# Patient Record
Sex: MALE | Race: WHITE | NOT HISPANIC OR LATINO | Employment: FULL TIME | URBAN - METROPOLITAN AREA
[De-identification: names, ages, dates, MRNs, and addresses within clinical notes are randomized per-mention and may not be internally consistent; named-entity substitution may affect disease eponyms.]

---

## 2024-02-01 ENCOUNTER — TELEPHONE (OUTPATIENT)
Dept: GASTROENTEROLOGY | Facility: CLINIC | Age: 49
End: 2024-02-01
Payer: COMMERCIAL

## 2024-02-01 NOTE — TELEPHONE ENCOUNTER
Called & spoke to pt  - Requesting a call back in 15 minutes due to currently being on a conference call

## 2024-02-01 NOTE — TELEPHONE ENCOUNTER
----- Message from Jolynn Chicas RN sent at 2/1/2024 10:54 AM CST -----    ----- Message -----  From: Beni Braun  Sent: 2/1/2024  10:52 AM CST  To: Perlita Salas    Good morning,     The pt listed above is being referred from [facility] to Dr. Bhat for (ibd). I have scanned the records into . I faxed the referring office the referral form and will scan it into  if we receive it. Please advise or contact pt to schedule appt at your earliest convenience.     Thank You,     Beni Braun  [facility]

## 2024-02-02 ENCOUNTER — TELEPHONE (OUTPATIENT)
Dept: GASTROENTEROLOGY | Facility: CLINIC | Age: 49
End: 2024-02-02
Payer: COMMERCIAL

## 2024-02-05 ENCOUNTER — TELEPHONE (OUTPATIENT)
Dept: GASTROENTEROLOGY | Facility: CLINIC | Age: 49
End: 2024-02-05
Payer: COMMERCIAL

## 2024-02-05 NOTE — PROGRESS NOTES
"          [facility]          Inflammatory Bowel Disease          New Patient Note         TODAY'S VISIT DATE:  2/6/2024    Reason for Consult:    Chief Complaint   Patient presents with    Crohn's Disease     PCP: Unable, To Obtain      Referring MD:   Dr. KEO Mosher    History of Present Illness:    Dear. Dr. KEO Mosher    Thank you for your referral on your patient Dharmesh Lozoya who is a 48 y.o. male seen today at the [facility] for Crohn's disease (pancolonic, perianal fistula) with pertinent past medical history including history of C diff (neg 12/2022, 5/2023).  Patient was doing well until 2015 and developed blood in his stools and saw family medicine doctor in [place] and had a flex sig and diagnosed "colitis."  He then had a colonoscopy by a GI doctor in [place] and recalls being told distal colitis and started on medication that was too expensive and took for 30 days (sounds like apriso) but too expensive and not effective so discontinued. At that time he did not have insurance so he proceeded with dietary changes (more fish, turmeric).  Within a few weeks symptoms resolved and back to baseline normal BMs 4-5 variable consistency but no blood and able to go back to normal lifestyle. In 2017 he moved from [place] to [place] and in 12/2022 began with worsening bloody diarrhea, cramping and weight loss. He was diagnosed with C diff and vancomycin started which helped significant with his symptoms. His symptoms went from 10-15 BMs/d to 8-12 BMs/d after oral vancomycin.   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  He was then placed on lialda 3.6 g/d with entocort 9 mg/d (decreased to 6 " mg/d 3/2023) which he took for 1 month with some response and then discontinued due to ineffectiveness. He was hospitalized 3/2023 at [facility] for 1 days due to significant bloody diarrhea with weakness and dehydration and he had ran out of entocort a week before and was off of lialda by that time. He was discharged home on prednisone 40 mg daily for 2 weeks and advised to f/u with his GI doctor. In 3/2023 he noticed abscess which spontaneously drained and also at that time had issues with hemorrhoids He then started stelara 23. In 2023 pt had good response in regards to bleeding and stool frequency and at that time C diff recurrent and treated with vancomycin x 10 days.  Approximately about 6 weeks after starting IV stelara noticed initially small pruritic blisters between toes and then progressed to head, arms, legs, feet, toes. These lesions were treated symptomatically with hydroxyzine and he took athlete's foot powder and then eventually got topical cream for horses that helped (antifungal/antibacterial). Diet he found if dairy, spicy foods, red sauce exacerbated symptoms.  Colonoscopy 23 significant for inflammation with congestion, erosions, friability and mucus in a contiuous and circumferential pattern from anus to sigmoid c/w moderate inflammation (hernandez score 3)-bx c/w moderate active inflammation, CMV neg. Perianal exam at time of scope c/w palpable lump c/w fistula with drainage. In 2023 pt started on oral prednisone 40 mg daily for 3 weeks and then decreased to 30 mg/d around 24 and continues on this dose now.  He does not notice fistula or drainage. His last dose of stelara 24 and after discontinuation the rash resolved. Currently he has 4-5 soft to formed BMs/d, no blood, no urgency, no nocturnal BMs (AM gas) and this has improved since first week of 2024.      Prior Pertinent Surgeries:   None    Last pertinent Endoscopy/Imagin2023 colonoscopy:  "inflammation with altered vascularity, congestion, erosions, friability and serpentine ulcerations in a continuous and circumferential pattern from anus to cecum, normal TI   12/29/23 colonoscopy:  inflammation with congestion, erosions, friability ad mucus in a contiuous and circumferential pattern from anus to sigmoid c/w moderate inflammation (hernandez score 3). Scope not complete due to degree of inflammation. Biopsies showed rectum and sigmoid with moderately active inflammation with ulceration, CMV negative. On colonoscopy there was fistula with palpable lump "peanut" size with drainage.    Therapeutic Drug Monitoring Labs:  NA    Prior IBD Therapies:  Oral mesalamine (unclear which one but seems like apriso)- ineffective but only took for 4 weeks  lialda 3.6 g/d   Entocort 9 mg/d- ineffective    Vaccinations:  No results found for: "HEPBSAB"  No results found for: "HEPBSURFABQU"  Lab Results   Component Value Date    HEPAIGG Non-reactive 02/06/2024     No results found for: "VARICELLAZOS", "VARICELLAINT"    There is no immunization history on file for this patient.  Flu shot: recommended yearly, declined  COVID vaccine/booster:  per CDC recommendations  Tetanus (Tdap):  not had one in past 10 years   PPSV 20: deferred  HPV: NA  Meningococcal: NA  Hepatitis B: will check immunity     Hepatitis A:  will check immunity     MMR (live vaccine): will check immunity          Chickenpox status/Varicella (live vaccine):  will check immunity     Shingrix: recommended     Review of Systems   Constitutional:  Negative for chills, fever and weight loss.   HENT:          No oral ulcers, dysphagia, oral thrush   Eyes:  Negative for blurred vision, pain and redness.   Respiratory:  Negative for cough and shortness of breath.    Cardiovascular:  Negative for chest pain.   Gastrointestinal:  Negative for abdominal pain, heartburn, nausea and vomiting.   Genitourinary:  Negative for dysuria and hematuria.   Musculoskeletal:  " "Negative for back pain and joint pain.   Skin:  Negative for rash.   Psychiatric/Behavioral:  Negative for depression. The patient is not nervous/anxious and does not have insomnia.      Medical/Surgical History:    has a past medical history of Crohn's disease and Recurrent Clostridioides difficile infection.    has no past surgical history on file.     Family History:   family history includes Anuerysm in his brother; Heart attack in his father and mother; Hypertension in his father and mother; Inflammatory bowel disease in his father; Seizures in his brother.     Social History:    reports that he has quit smoking. His smoking use included cigarettes. He started smoking about 15 years ago. He has a 15.0 pack-year smoking history. His smokeless tobacco use includes snuff. He reports current alcohol use. He reports that he does not use drugs.     Review of patient's allergies indicates:   Allergen Reactions    Stelara [ustekinumab] Blisters     Current Medications:   Outpatient Medications Marked as Taking for the 2/6/24 encounter (Office Visit) with Tianna Bhat MD   Medication Sig Dispense Refill    predniSONE (DELTASONE) 10 MG tablet Take 30 mg by mouth once daily.        Vital Signs:  BP (!) 137/90 (BP Location: Left arm, Patient Position: Sitting)   Pulse 81   Temp 99 °F (37.2 °C)   Ht 6' 2" (1.88 m)   Wt 100.5 kg (221 lb 9 oz)   SpO2 99%   BMI 28.45 kg/m²      Physical Exam  Constitutional:       General: He is not in acute distress.  Cardiovascular:      Rate and Rhythm: Normal rate and regular rhythm.      Pulses: Normal pulses.      Heart sounds: Normal heart sounds. No murmur heard.  Pulmonary:      Effort: Pulmonary effort is normal.      Breath sounds: Normal breath sounds.   Abdominal:      General: Bowel sounds are normal. There is no distension.      Palpations: Abdomen is soft.      Tenderness: There is no abdominal tenderness.   Genitourinary:     Comments: Perianal small fistula, no " abscess    Labs: Reviewed    Assessment/Plan:  Dharmesh Lozoya is a 48 y.o. male with Crohn's disease (pan-colonic, perianal fistula), recurrent C diff (12/2022, 5/2023).     Patient is currently on prednisone 30 mg/d and has been on prednisone since 12/2023. He has good response and we will do a stool calprotectin and I have asked today that he decrease prednisone from 30 mg daily to 20 mg daily and we will check in on him in 1 week. We had a long discussion regarding epidemiology, potential etiologies, associations and triggers, diagnosis, management goals and treatment options. Patient was on Stelara though difficult to know if he was a responder or not given he was on it for about 4 mos but also was not induced into remission adequately until after it was discontinued.  He had some blisters attributed to stelara and so he discontinued this and this resolved.  Today we discussed different treatment options. We discussed that though he has small fistula that the data for perianal fistula closure is best with TNF inhibitors.  We also discussed importance of getting him off of prednisone and on a treatment that will work relatively sooner.  Options discussed included TNF inhibitors including remicade and humira and entyvio. After extensive discussion we will proceed with humira with oral MTX.     # Crohn's disease (pan-colonic, perianal fistula):    - stool calprotectin- pt will get done [facility]  - MRI pelvis  - colonoscopy timing to be determined  - discussed MOA, risks, route/dose of humira and oral MTX  - decrease prednisone from 30 mg daily to 20 mg daily tomorrow, 2/7  - start MTX 12.5 mg po weekly, folic acid 1 mg daily   - will start insurance authorization for humira  - smoking status: never smoked   - basic labs: CBC, CMP, CRP, HCV Ab, HIV, vitamin B12, TSH, TTG IgA/total serum IgA  - drug monitoring labs: TPMT, TB quantiferon, Hep B testing (HBsAg, HBtotalcoreAb)  - TDM:  trough ADA level week 12    #  Immunodeficiency due to long term immunosuppressive drug therapy and IBD specific health maintenance:  CRC risk- sx 2015, pancolonic, surveillance colonoscopy q 1-2 years  Skin exam- recommend using sunblock/hats/sunprotective clothing and yearly skin exams  Risk for osteopenia/osteoporosis- on prednisone, will recommend calcium/vit D  Vitamin D- will check level today  Vaccines- no live vaccines, briefly discussed importance of vaccines, will check immunity to hep A, B, varicella and MMR    Visit today is associated with current or anticipated ongoing medical care related to this patient's single serious condition/complex condition Crohn's disease.     I spent a total of 60 minutes on the day of the visit.This includes face to face time and on-face to face time preparing to see the patient (eg, review of tests, notes), obtaining and/or reviewing separately obtained history, documenting clinical information in the electronic or other health record, independently interpreting results and communicating results to the patient/family/caregiver, and coordinating care.     Follow up in about 6 weeks (around 3/19/2024) for appt with Perlita/Janeen mcfarland to overarash .    Thank you again for sending Dharmesh Lozoya to see Dr. Tianna Bhat today at the [facility]. Please don't hesitate to contact Dr. Bhat if there are any questions regarding this evaluation, or if you have any other patients with inflammatory bowel disease for whom you would like a consultation. You can reach Dr. Bhat at [phone] or by email at [email or web address]    Tianna Bhat MD  Department of Gastroenterology  Medical Director, Inflammatory Bowel Disease

## 2024-02-06 ENCOUNTER — OFFICE VISIT (OUTPATIENT)
Dept: GASTROENTEROLOGY | Facility: CLINIC | Age: 49
End: 2024-02-06
Payer: COMMERCIAL

## 2024-02-06 ENCOUNTER — TELEPHONE (OUTPATIENT)
Dept: GASTROENTEROLOGY | Facility: CLINIC | Age: 49
End: 2024-02-06
Payer: COMMERCIAL

## 2024-02-06 ENCOUNTER — LAB VISIT (OUTPATIENT)
Dept: LAB | Facility: HOSPITAL | Age: 49
End: 2024-02-06
Attending: INTERNAL MEDICINE
Payer: COMMERCIAL

## 2024-02-06 VITALS
HEIGHT: 74 IN | WEIGHT: 221.56 LBS | TEMPERATURE: 99 F | SYSTOLIC BLOOD PRESSURE: 137 MMHG | DIASTOLIC BLOOD PRESSURE: 90 MMHG | BODY MASS INDEX: 28.43 KG/M2 | HEART RATE: 81 BPM | OXYGEN SATURATION: 99 %

## 2024-02-06 DIAGNOSIS — K50.913 PERIANAL FISTULA DUE TO CROHN'S DISEASE: ICD-10-CM

## 2024-02-06 DIAGNOSIS — A49.8 RECURRENT CLOSTRIDIOIDES DIFFICILE INFECTION: Primary | ICD-10-CM

## 2024-02-06 DIAGNOSIS — T45.1X5A IMMUNODEFICIENCY DUE TO LONG TERM IMMUNOSUPPRESSIVE DRUG THERAPY: ICD-10-CM

## 2024-02-06 DIAGNOSIS — Z79.899 IMMUNODEFICIENCY DUE TO LONG TERM IMMUNOSUPPRESSIVE DRUG THERAPY: ICD-10-CM

## 2024-02-06 DIAGNOSIS — K50.10 CROHN'S DISEASE OF COLON WITHOUT COMPLICATION: ICD-10-CM

## 2024-02-06 DIAGNOSIS — D84.821 IMMUNODEFICIENCY DUE TO LONG TERM IMMUNOSUPPRESSIVE DRUG THERAPY: ICD-10-CM

## 2024-02-06 PROBLEM — Z79.60 IMMUNODEFICIENCY DUE TO LONG TERM IMMUNOSUPPRESSIVE DRUG THERAPY: Status: ACTIVE | Noted: 2024-02-06

## 2024-02-06 LAB
25(OH)D3+25(OH)D2 SERPL-MCNC: 23 NG/ML (ref 30–96)
ALBUMIN SERPL BCP-MCNC: 3.4 G/DL (ref 3.5–5.2)
ALBUMIN SERPL BCP-MCNC: 3.4 G/DL (ref 3.5–5.2)
ALP SERPL-CCNC: 66 U/L (ref 55–135)
ALP SERPL-CCNC: 66 U/L (ref 55–135)
ALT SERPL W/O P-5'-P-CCNC: 55 U/L (ref 10–44)
ALT SERPL W/O P-5'-P-CCNC: 55 U/L (ref 10–44)
ANION GAP SERPL CALC-SCNC: 7 MMOL/L (ref 8–16)
ANION GAP SERPL CALC-SCNC: 7 MMOL/L (ref 8–16)
AST SERPL-CCNC: 27 U/L (ref 10–40)
AST SERPL-CCNC: 27 U/L (ref 10–40)
BASOPHILS # BLD AUTO: 0.01 K/UL (ref 0–0.2)
BASOPHILS # BLD AUTO: 0.01 K/UL (ref 0–0.2)
BASOPHILS NFR BLD: 0.1 % (ref 0–1.9)
BASOPHILS NFR BLD: 0.1 % (ref 0–1.9)
BILIRUB SERPL-MCNC: 0.4 MG/DL (ref 0.1–1)
BILIRUB SERPL-MCNC: 0.4 MG/DL (ref 0.1–1)
BUN SERPL-MCNC: 10 MG/DL (ref 6–20)
BUN SERPL-MCNC: 10 MG/DL (ref 6–20)
CALCIUM SERPL-MCNC: 8.8 MG/DL (ref 8.7–10.5)
CALCIUM SERPL-MCNC: 8.8 MG/DL (ref 8.7–10.5)
CHLORIDE SERPL-SCNC: 109 MMOL/L (ref 95–110)
CHLORIDE SERPL-SCNC: 109 MMOL/L (ref 95–110)
CO2 SERPL-SCNC: 24 MMOL/L (ref 23–29)
CO2 SERPL-SCNC: 24 MMOL/L (ref 23–29)
CREAT SERPL-MCNC: 1 MG/DL (ref 0.5–1.4)
CREAT SERPL-MCNC: 1 MG/DL (ref 0.5–1.4)
CRP SERPL-MCNC: 1.5 MG/L (ref 0–8.2)
DIFFERENTIAL METHOD BLD: ABNORMAL
DIFFERENTIAL METHOD BLD: ABNORMAL
EOSINOPHIL # BLD AUTO: 0 K/UL (ref 0–0.5)
EOSINOPHIL # BLD AUTO: 0 K/UL (ref 0–0.5)
EOSINOPHIL NFR BLD: 0 % (ref 0–8)
EOSINOPHIL NFR BLD: 0 % (ref 0–8)
ERYTHROCYTE [DISTWIDTH] IN BLOOD BY AUTOMATED COUNT: 20.8 % (ref 11.5–14.5)
ERYTHROCYTE [DISTWIDTH] IN BLOOD BY AUTOMATED COUNT: 20.8 % (ref 11.5–14.5)
EST. GFR  (NO RACE VARIABLE): >60 ML/MIN/1.73 M^2
EST. GFR  (NO RACE VARIABLE): >60 ML/MIN/1.73 M^2
GLUCOSE SERPL-MCNC: 154 MG/DL (ref 70–110)
GLUCOSE SERPL-MCNC: 154 MG/DL (ref 70–110)
HAV IGG SER QL IA: NORMAL
HBV CORE AB SERPL QL IA: NORMAL
HBV SURFACE AG SERPL QL IA: NORMAL
HCT VFR BLD AUTO: 38.4 % (ref 40–54)
HCT VFR BLD AUTO: 38.4 % (ref 40–54)
HCV AB SERPL QL IA: NORMAL
HGB BLD-MCNC: 11.4 G/DL (ref 14–18)
HGB BLD-MCNC: 11.4 G/DL (ref 14–18)
HIV 1+2 AB+HIV1 P24 AG SERPL QL IA: NORMAL
IGA SERPL-MCNC: 349 MG/DL (ref 40–350)
IMM GRANULOCYTES # BLD AUTO: 0.05 K/UL (ref 0–0.04)
IMM GRANULOCYTES # BLD AUTO: 0.05 K/UL (ref 0–0.04)
IMM GRANULOCYTES NFR BLD AUTO: 0.5 % (ref 0–0.5)
IMM GRANULOCYTES NFR BLD AUTO: 0.5 % (ref 0–0.5)
LYMPHOCYTES # BLD AUTO: 0.7 K/UL (ref 1–4.8)
LYMPHOCYTES # BLD AUTO: 0.7 K/UL (ref 1–4.8)
LYMPHOCYTES NFR BLD: 6.9 % (ref 18–48)
LYMPHOCYTES NFR BLD: 6.9 % (ref 18–48)
MCH RBC QN AUTO: 22.9 PG (ref 27–31)
MCH RBC QN AUTO: 22.9 PG (ref 27–31)
MCHC RBC AUTO-ENTMCNC: 29.7 G/DL (ref 32–36)
MCHC RBC AUTO-ENTMCNC: 29.7 G/DL (ref 32–36)
MCV RBC AUTO: 77 FL (ref 82–98)
MCV RBC AUTO: 77 FL (ref 82–98)
MONOCYTES # BLD AUTO: 0.8 K/UL (ref 0.3–1)
MONOCYTES # BLD AUTO: 0.8 K/UL (ref 0.3–1)
MONOCYTES NFR BLD: 7.4 % (ref 4–15)
MONOCYTES NFR BLD: 7.4 % (ref 4–15)
NEUTROPHILS # BLD AUTO: 8.7 K/UL (ref 1.8–7.7)
NEUTROPHILS # BLD AUTO: 8.7 K/UL (ref 1.8–7.7)
NEUTROPHILS NFR BLD: 85.1 % (ref 38–73)
NEUTROPHILS NFR BLD: 85.1 % (ref 38–73)
NRBC BLD-RTO: 0 /100 WBC
NRBC BLD-RTO: 0 /100 WBC
PLATELET # BLD AUTO: 244 K/UL (ref 150–450)
PLATELET # BLD AUTO: 244 K/UL (ref 150–450)
PMV BLD AUTO: 12.5 FL (ref 9.2–12.9)
PMV BLD AUTO: 12.5 FL (ref 9.2–12.9)
POTASSIUM SERPL-SCNC: 3.5 MMOL/L (ref 3.5–5.1)
POTASSIUM SERPL-SCNC: 3.5 MMOL/L (ref 3.5–5.1)
PROT SERPL-MCNC: 6.3 G/DL (ref 6–8.4)
PROT SERPL-MCNC: 6.3 G/DL (ref 6–8.4)
RBC # BLD AUTO: 4.97 M/UL (ref 4.6–6.2)
RBC # BLD AUTO: 4.97 M/UL (ref 4.6–6.2)
SODIUM SERPL-SCNC: 140 MMOL/L (ref 136–145)
SODIUM SERPL-SCNC: 140 MMOL/L (ref 136–145)
TSH SERPL DL<=0.005 MIU/L-ACNC: 0.41 UIU/ML (ref 0.4–4)
VIT B12 SERPL-MCNC: 348 PG/ML (ref 210–950)
WBC # BLD AUTO: 10.2 K/UL (ref 3.9–12.7)
WBC # BLD AUTO: 10.2 K/UL (ref 3.9–12.7)

## 2024-02-06 PROCEDURE — 86762 RUBELLA ANTIBODY: CPT | Performed by: INTERNAL MEDICINE

## 2024-02-06 PROCEDURE — 86140 C-REACTIVE PROTEIN: CPT | Performed by: INTERNAL MEDICINE

## 2024-02-06 PROCEDURE — 85025 COMPLETE CBC W/AUTO DIFF WBC: CPT | Performed by: INTERNAL MEDICINE

## 2024-02-06 PROCEDURE — 86787 VARICELLA-ZOSTER ANTIBODY: CPT | Performed by: INTERNAL MEDICINE

## 2024-02-06 PROCEDURE — 86803 HEPATITIS C AB TEST: CPT | Performed by: INTERNAL MEDICINE

## 2024-02-06 PROCEDURE — 82306 VITAMIN D 25 HYDROXY: CPT | Performed by: INTERNAL MEDICINE

## 2024-02-06 PROCEDURE — 86364 TISS TRNSGLTMNASE EA IG CLAS: CPT | Performed by: INTERNAL MEDICINE

## 2024-02-06 PROCEDURE — 82607 VITAMIN B-12: CPT | Performed by: INTERNAL MEDICINE

## 2024-02-06 PROCEDURE — 86790 VIRUS ANTIBODY NOS: CPT | Performed by: INTERNAL MEDICINE

## 2024-02-06 PROCEDURE — 82784 ASSAY IGA/IGD/IGG/IGM EACH: CPT | Performed by: INTERNAL MEDICINE

## 2024-02-06 PROCEDURE — 84433 ASY THIOPURIN S-MTHYLTRNSFRS: CPT | Performed by: INTERNAL MEDICINE

## 2024-02-06 PROCEDURE — 86480 TB TEST CELL IMMUN MEASURE: CPT | Performed by: INTERNAL MEDICINE

## 2024-02-06 PROCEDURE — 87389 HIV-1 AG W/HIV-1&-2 AB AG IA: CPT | Performed by: INTERNAL MEDICINE

## 2024-02-06 PROCEDURE — 87340 HEPATITIS B SURFACE AG IA: CPT | Performed by: INTERNAL MEDICINE

## 2024-02-06 PROCEDURE — 86706 HEP B SURFACE ANTIBODY: CPT | Performed by: INTERNAL MEDICINE

## 2024-02-06 PROCEDURE — 86704 HEP B CORE ANTIBODY TOTAL: CPT | Performed by: INTERNAL MEDICINE

## 2024-02-06 PROCEDURE — G2211 COMPLEX E/M VISIT ADD ON: HCPCS | Mod: S$GLB,,, | Performed by: INTERNAL MEDICINE

## 2024-02-06 PROCEDURE — 86735 MUMPS ANTIBODY: CPT | Performed by: INTERNAL MEDICINE

## 2024-02-06 PROCEDURE — 86765 RUBEOLA ANTIBODY: CPT | Performed by: INTERNAL MEDICINE

## 2024-02-06 PROCEDURE — 84443 ASSAY THYROID STIM HORMONE: CPT | Performed by: INTERNAL MEDICINE

## 2024-02-06 PROCEDURE — 99205 OFFICE O/P NEW HI 60 MIN: CPT | Mod: S$GLB,,, | Performed by: INTERNAL MEDICINE

## 2024-02-06 PROCEDURE — 80053 COMPREHEN METABOLIC PANEL: CPT | Performed by: INTERNAL MEDICINE

## 2024-02-06 RX ORDER — PREDNISONE 10 MG/1
30 TABLET ORAL DAILY
COMMUNITY
End: 2024-03-12 | Stop reason: SDUPTHER

## 2024-02-06 RX ORDER — FOLIC ACID 1 MG/1
1 TABLET ORAL DAILY
Qty: 90 TABLET | Refills: 3 | Status: SHIPPED | OUTPATIENT
Start: 2024-02-06 | End: 2024-06-17

## 2024-02-06 RX ORDER — ADALIMUMAB 80MG/0.8ML
80 KIT SUBCUTANEOUS SEE ADMIN INSTRUCTIONS
Qty: 3 PEN | Refills: 0 | Status: ACTIVE | OUTPATIENT
Start: 2024-02-06 | End: 2024-06-14 | Stop reason: ALTCHOICE

## 2024-02-06 RX ORDER — METHOTREXATE 2.5 MG/1
12.5 TABLET ORAL
Qty: 20 TABLET | Refills: 0 | Status: SHIPPED | OUTPATIENT
Start: 2024-02-06 | End: 2024-03-04

## 2024-02-06 RX ORDER — FOLIC ACID 1 MG/1
1 TABLET ORAL DAILY
Qty: 90 TABLET | Refills: 3 | Status: SHIPPED | OUTPATIENT
Start: 2024-02-06 | End: 2024-02-06 | Stop reason: CLARIF

## 2024-02-06 RX ORDER — METHOTREXATE 2.5 MG/1
12.5 TABLET ORAL
Qty: 20 TABLET | Refills: 0 | Status: SHIPPED | OUTPATIENT
Start: 2024-02-06 | End: 2024-02-06 | Stop reason: CLARIF

## 2024-02-06 NOTE — PATIENT INSTRUCTIONS
Instructions:  - labs today  - stool studies  - decrease prednisone to 20 mg daily  - we will start insurance approval for humira- email me if when you start  - start methotrexate 5 pills once a week with folic acid 1 mg daily  - take methotrexate at bedtime once a week and if any nausea let me know  - labs ideally at [facility] if possible   - Avoid all NSAIDs:  ibuprofen (Advil, Motrin), naprosyn (Aleve), aspirin, BC powder, Goodie's powder, diclofenac (Voltaren)  - Tylenol (acetaminophen) safe in IBD   - Yearly Skin exam--wear sun block and hats  - Use antibiotics with caution and only take if necessary, please inform us of any infections or need for antibiotics   - No live vaccines if your are immunosuppressed   - Please avoid raw seafood (such as raw oysters or raw sushi) if you are immunosuppressed     Adalimumab (Humira)     Class: TNF alpha Blocker - Biologic product    Mechanism of Action: blocks TNF alpha which plays a role in the inflammatory process for Inflammatory Bowel Disease (IBD).    Other indications: Rheumatoid Arthritis, Psoriatic Arthritis, Ankylosing Spondylitis, Juvenile Idiopathic Arthritis, Plaque Psoriasis, Hidradenitis Suppurativa, Uveitis.    Dosage/ Route/ Frequency: Inject under the skin in the subcutaneous tissue   - Loading Dose (non-citrate free): 160 mg Subcutaneous (SC) on week 0 (4 pens or pre-filled syringes), 80 mg SC on week 2 (2 pens or prefilled syringes)   - Citrate Free Loading Dose: 160 mg Subcutaneous on week 0 (2 pens or pre-filled syringes), 80 mg SC on week 2 (1 pens or prefilled syringes)  - Maintenance Dosin mg SC every other week (1 pen or prefilled syringe)    Side effects    Stop therapy due to adverse event= 10% (10/100)  Please notify us if you are treated with antibiotics or experience signs of infection (ie. fevers) given we may need to hold your medication during this time.     Common side effects (>5% - 1 in 20 people): upper respiratory infection,  nose/throat infections, headaches, rash.    Serious side effects:     Please call us immediately if you develop any of the below problems:    Allergic reactions  - <2% (2/100) develop injection site reactions  - Hypersensitivity reaction- hives (rash), difficulty breathing, chest pain/tightness, high or low blood pressure, swelling of the face and hands, fever or chills    Serious Infections 3% (3/100): viral/bacterial and fungal infections. Symptoms you might experience include fever, tiredness, flu like symptoms, open sores, warm red painful skin.    Blood Disorders: fever that doesn't go away, bruising, bleeding, severe paleness    Malignancies:  Non-Hodgkin's Lymphoma 0.06% (6/10,000)  Non-Melanoma skin cancer This medication may increase your risk of skin cancer, yearly skin checks are recommended. Make sure you are using sun block and protective wear.    Drug related lupus-like reaction 1% (1/100): chest discomfort or pain that does not go away, shortness of breath, joint pain, rash on the cheeks or arms that gets worse in the sun    Psoriasis: new or worsening red scaly patches or raised bumps on the skin that are filled with pus     Case Reports Only: Multiple Sclerosis and Guillain [place] Syndrome  Numbness/weakness/tingling of your hands and feet, changes in your vision or seizures    Case Reports Only: New or worsening Congestive Heart Failure  shortness of breath, swelling in your ankles or feet, sudden weight gain    Case Reports Only: Serious Liver Injury  jaundice (yellow skin or eyes), dark brown urine, right-sided abdominal pain, fever, severe itchiness    Labs/Monitoring:  Prior to starting this new agent, we will be checking labs to determine the safety of taking this medication including baseline blood counts, liver and kidney function tests, TB test and viral hepatitis testing.   Once started on the therapy we will monitor your blood count and your liver and kidney function tests as needed  following evidence-based guidelines. TB test and viral hepatitis tests will be repeated every year. If you have any risk of exposure or travel to high-risk areas, please notify us so we can do this testing sooner. If indicated your provider may also choose to check drug levels to monitor or optimize your response to the medication.   We recommend you do not start this injection on a Sunday for lab purposes.     Storage recommendations:  Keep refrigerated in temperature between 36°F to 46°F (2°C to 8°C).  If necessary, can be in room temperature (77°F or 25°C) for max of 14 days in the original carton protected from light  Do NOT shake and do NOT freeze     Vaccine counseling:   Avoid live vaccines which include:  Intranasal Influenza LAIV4 (FluMist Quadrivalent)  Measles, Mumps, Rubella (MMR)  Rotavirus (RotaTeq, Rotarix)  Typhoid oral capsule (Vivotif)  Varicella (Varivax)  Smallpox (Vaccinia)  Yellow Fever (YF-Vax)  Adenovirus  Cholera (Vaxchora)    Avoid consumption of raw seafood such as oysters/sushi.      Vedolizumab (Entyvio)    Class: Integrin Blocker - Biologic product    Mechanism of Action: blocks alpha-4 beta-7 which plays a role in the inflammatory process for Inflammatory Bowel Disease (IBD) and works specifically in the gut.    Dosage/ Route/ Frequency: Intravenous (IV) Infusion that will be performed at an infusion center   - Loading Dose: 300 mg IV on week 0, 2, and 6  - Maintenance Dose: 300 mg IV every 8 weeks  - 30-minute infusion    Please call us immediately if you develop any of the below problems:  Please notify us if you are treated with antibiotics or experience signs of infection (ie. fevers, chills, sores, etc) given we may need to hold your medication during this time.     Side effects (>3% or 3 in 100 people):   Upper respiratory infections including nasopharyngitis (runny nose/sore throat), bronchitis, sinusitis, flu  Fatigue, headaches, back pain, cough, rash, fever, extremity/joint  pain are rare    Serious side effects:     Infusion related reactions: These reactions might occur with the first or subsequent infusions. Includes rash, increased blood pressure and heart rate, swelling of your lips, shortness of breath, flushing etc.      Serious infections: viral/bacterial and fungal. Make sure to practice good hand hygiene and get the recommended vaccinations.     Liver Injury: This medication can increase your liver enzymes and is usually reversible with stopping the medication, your labs will be monitored regularly     PML (Progressive multifocal leukoencephalopathy): 1 case in a patient with multiple risk factors for PML (ie, HIV, CD4 ct 300, prolonged prior and concurrent immunosuppression).  Please be aware symptoms may include progressive weakness on one side of the body or clumsiness of limbs, disturbance of vision, and changes in thinking, memory, and orientation leading to confusion and personality changes.     Labs/Monitoring:  Prior to starting this new agent, we will be checking labs to determine the safety of taking this medication including baseline blood counts, liver and kidney function tests, TB test and viral hepatitis testing.   Once started on the therapy we will monitor your blood count and your liver and kidney function tests as needed following evidence-based guidelines. TB test and viral hepatitis tests will be repeated every year. If you have any risk of exposure or travel to high risk area please notify us so we can do this testing sooner. If indicated your provider may also choose to check drug levels to monitor or optimize your response to the medication.     Vaccine counseling:   Avoid live vaccines which include:  Intranasal Influenza LAIV4 (FluMist Quadrivalent)  Measles, Mumps, Rubella (MMR)  Rotavirus (RotaTeq, Rotarix)  Typhoid oral capsule (Vivotif)  Varicella (Varivax)  Smallpox (Vaccinia)  Yellow Fever (YF-Vax)  Adenovirus  Cholera (Vaxchora)    Avoid  consumption of raw seafood such as oysters/sushi.

## 2024-02-07 ENCOUNTER — TELEPHONE (OUTPATIENT)
Dept: GASTROENTEROLOGY | Facility: CLINIC | Age: 49
End: 2024-02-07
Payer: COMMERCIAL

## 2024-02-07 LAB
GAMMA INTERFERON BACKGROUND BLD IA-ACNC: 0.04 IU/ML
M TB IFN-G CD4+ BCKGRND COR BLD-ACNC: 0.01 IU/ML
M TB IFN-G CD4+ BCKGRND COR BLD-ACNC: 0.01 IU/ML
MITOGEN IGNF BCKGRD COR BLD-ACNC: 3.83 IU/ML
MUMPS IGG INTERPRETATION: NEGATIVE
MUMPS IGG SCREEN: <5 AU/ML
RUBEOLA IGG ANTIBODY: 64.2 AU/ML
RUBEOLA INTERPRETATION: POSITIVE
RUBV IGG SER-ACNC: 35 IU/ML
RUBV IGG SER-IMP: REACTIVE
TB GOLD PLUS: NEGATIVE
VARICELLA INTERPRETATION: POSITIVE
VARICELLA ZOSTER IGG: 1030 AU/ML

## 2024-02-07 NOTE — TELEPHONE ENCOUNTER
----- Message from Tianna Bhat MD sent at 2/6/2024  8:50 PM CST -----  Please fax my note to Dr. Mosher and be sure they receive    SS

## 2024-02-08 ENCOUNTER — TELEPHONE (OUTPATIENT)
Dept: GASTROENTEROLOGY | Facility: CLINIC | Age: 49
End: 2024-02-08
Payer: COMMERCIAL

## 2024-02-08 DIAGNOSIS — K50.10 CROHN'S DISEASE OF COLON WITHOUT COMPLICATION: Primary | ICD-10-CM

## 2024-02-08 DIAGNOSIS — R74.8 ELEVATED LIVER ENZYMES: ICD-10-CM

## 2024-02-08 NOTE — TELEPHONE ENCOUNTER
Spoke with Dharmesh:  IBD meds:  - Humira to start  - MTX 12.5mg weekly - took 2/7/24    - reviewed labs and Dr. Bhat's recommendation  - denies medication changes, tylenol use, ETOH use, supplement use, weight gain in last 6 mos, abd pain, and fever  - agrees to recommendation for blood work   - per Dr. Bhat wait on US until after MRI  - states he will get blood drawn & turn in stool sample 2/9   - pt states understanding and agrees to this plan

## 2024-02-08 NOTE — TELEPHONE ENCOUNTER
----- Message from Tianna Bhat MD sent at 2/7/2024  4:20 PM CST -----  Review labs with patient that are pertinent  Mild anemia  ALT elevated at 55- please ask questions regarding alcohol use, weight gain, new meds and ask him to not take the methotrexate due to this (will do humira monotherapy), liver workup- please arrange for pt to have a [facility]. I have placed orders. US at time of next visit to be arranged. No alcohol.    Mild anemia

## 2024-02-08 NOTE — TELEPHONE ENCOUNTER
Confirmed with staff at [facility], Dr. Mosher did receive Dr. Bhat's note that was faxed.

## 2024-02-09 LAB
HBV SURFACE AB SER QL IA: NEGATIVE
HBV SURFACE AB SERPL IA-ACNC: <3 MIU/ML
TTG IGA SER-ACNC: 1.5 U/ML

## 2024-02-11 LAB
ALBUMIN SERPL-MCNC: 4 G/DL (ref 4.1–5.1)
ALBUMIN/GLOB SERPL: 1.5 {RATIO} (ref 1.2–2.2)
ALP SERPL-CCNC: 75 IU/L (ref 44–121)
ALT SERPL-CCNC: 61 IU/L (ref 0–44)
AST SERPL-CCNC: 37 IU/L (ref 0–40)
BASOPHILS # BLD AUTO: 0 X10E3/UL (ref 0–0.2)
BASOPHILS NFR BLD AUTO: 0 %
BILIRUB DIRECT SERPL-MCNC: <0.1 MG/DL (ref 0–0.4)
BILIRUB SERPL-MCNC: 0.3 MG/DL (ref 0–1.2)
BUN SERPL-MCNC: 13 MG/DL (ref 6–24)
BUN/CREAT SERPL: 12 (ref 9–20)
CALCIUM SERPL-MCNC: 8.8 MG/DL (ref 8.7–10.2)
CHLORIDE SERPL-SCNC: 103 MMOL/L (ref 96–106)
CO2 SERPL-SCNC: 23 MMOL/L (ref 20–29)
CREAT SERPL-MCNC: 1.08 MG/DL (ref 0.76–1.27)
EOSINOPHIL # BLD AUTO: 0 X10E3/UL (ref 0–0.4)
EOSINOPHIL NFR BLD AUTO: 0 %
ERYTHROCYTE [DISTWIDTH] IN BLOOD BY AUTOMATED COUNT: 18.4 % (ref 11.6–15.4)
EST. GFR  (NO RACE VARIABLE): 86 ML/MIN/1.73
FERRITIN SERPL-MCNC: 14 NG/ML (ref 30–400)
GLOBULIN SER CALC-MCNC: 2.6 G/DL (ref 1.5–4.5)
GLUCOSE SERPL-MCNC: 198 MG/DL (ref 70–99)
HAV IGM SERPL QL IA: NEGATIVE
HCT VFR BLD AUTO: 38.5 % (ref 37.5–51)
HGB BLD-MCNC: 11.4 G/DL (ref 13–17.7)
IGA SERPL-MCNC: 372 MG/DL (ref 90–386)
IGG SERPL-MCNC: 861 MG/DL (ref 603–1613)
IGM SERPL-MCNC: 110 MG/DL (ref 20–172)
IMM GRANULOCYTES # BLD AUTO: 0 X10E3/UL (ref 0–0.1)
IMM GRANULOCYTES NFR BLD AUTO: 0 %
IRON SERPL-MCNC: 27 UG/DL (ref 38–169)
LYMPHOCYTES # BLD AUTO: 0.7 X10E3/UL (ref 0.7–3.1)
LYMPHOCYTES NFR BLD AUTO: 5 %
MCH RBC QN AUTO: 23.2 PG (ref 26.6–33)
MCHC RBC AUTO-ENTMCNC: 29.6 G/DL (ref 31.5–35.7)
MCV RBC AUTO: 78 FL (ref 79–97)
MITOCHONDRIA M2 IGG SER-ACNC: <20 UNITS (ref 0–20)
MONOCYTES # BLD AUTO: 0.5 X10E3/UL (ref 0.1–0.9)
MONOCYTES NFR BLD AUTO: 4 %
NEUTROPHILS # BLD AUTO: 11.6 X10E3/UL (ref 1.4–7)
NEUTROPHILS NFR BLD AUTO: 91 %
PLATELET # BLD AUTO: 320 X10E3/UL (ref 150–450)
POTASSIUM SERPL-SCNC: 4 MMOL/L (ref 3.5–5.2)
PROT SERPL-MCNC: 6.6 G/DL (ref 6–8.5)
RBC # BLD AUTO: 4.92 X10E6/UL (ref 4.14–5.8)
SODIUM SERPL-SCNC: 141 MMOL/L (ref 134–144)
TRANSFERRIN SERPL-MCNC: 284 MG/DL (ref 177–329)
TTG IGA SER-ACNC: <2 U/ML (ref 0–3)
WBC # BLD AUTO: 12.8 X10E3/UL (ref 3.4–10.8)

## 2024-02-12 ENCOUNTER — TELEPHONE (OUTPATIENT)
Dept: GASTROENTEROLOGY | Facility: CLINIC | Age: 49
End: 2024-02-12
Payer: COMMERCIAL

## 2024-02-12 LAB
6-METHYLMERCAPTOPURINE RIBOSIDE: 7.84 NMOL/ML/H (ref 5.04–9.57)
6-METHYLMERCAPTOPURINE: 5.91 NMOL/ML/H (ref 3–6.66)
6-METHYLTHIOGUANINE RIBOSIDE: 3.55 NMOL/ML/H (ref 2.7–5.84)
TPMT INTERPRETATION: NORMAL
TPMT REVIEWED BY: NORMAL

## 2024-02-12 NOTE — TELEPHONE ENCOUNTER
Spoke with Dharmesh:  IBD meds:  - Humira (plan to start)  - MTX dc'd d/t elevated LFTs    Prednisone 20mg/d  - 2 BM/d, loose to solid, +gas, +mucous, 3 false trips daily - passes gas  - denies blood, noc BM, and pain  - feels the same as a week ago    Dr. Bhat will be updated for recommendation.

## 2024-02-14 NOTE — TELEPHONE ENCOUNTER
"- Current IBD meds: prednisone 20 mg/ QD (started 12/2023; tapered from 30 mg/ QD 2/7/24)  - Informed Humira RX approved & pt to get this through [facility]; will let us know once received  - Overall feels stable w/ 2-3 soft BM/ QD  - Reports several false trips daily consisting of gas; pt confirmed that when this occurs it feels like a "dry heaving of the rectum" though pt attributes this to diet  - Pt asked if he should restart MTX; instructed pt to not restart this given recent elevated LFTs  - All questions answered   - Will review plan for prednisone taper w/ Dr. Bhat  "

## 2024-02-15 LAB — CALPROTECTIN STL-MCNT: 104 UG/G (ref 0–120)

## 2024-02-15 RX ORDER — MESALAMINE 1000 MG/1
1000 SUPPOSITORY RECTAL NIGHTLY
Qty: 30 SUPPOSITORY | Refills: 1 | Status: SHIPPED | OUTPATIENT
Start: 2024-02-15 | End: 2024-06-17

## 2024-02-15 NOTE — TELEPHONE ENCOUNTER
Spoke with Dharmesh:  - reviewed Dr. Bhat's recommendation:     - start Canasa qHS     - Pred 20     - Humira to start soon     - RN check in 1 wk  - he states understanding and agrees w this plan

## 2024-02-20 ENCOUNTER — TELEPHONE (OUTPATIENT)
Dept: GASTROENTEROLOGY | Facility: CLINIC | Age: 49
End: 2024-02-20
Payer: COMMERCIAL

## 2024-02-20 NOTE — TELEPHONE ENCOUNTER
"Spoke with Dharmesh:  IBD meds:  - Humira - awaiting approval  - Lynnette qHS - difficulty keeping in, does not push into rectum - advised to advance it a bit further to see if that will help    Prednisone 20mg (started 12/2023, 30mg at OV 2/6, 20mg 2/7)    - 2-3 BM/d, soft formed, +blood-very small amount on stool 2/19, + mucous each BM, 3 false trips w/gas, gas has improved from last week, tenderness felt with wiping d/t fistula per pt report - had not noticed any tenderness when on Pred 30mg  - denies noc BM  - Overall, feels "about the same" now as he did a week ago    Dr. Bhat will be updated for recommendation.  "

## 2024-02-20 NOTE — TELEPHONE ENCOUNTER
Spoke with Dharmesh:  - reviewed Dr. Bhat's recommendations  - decrease Prednisone to 15mg/d  - continue Canasa - will check in 2/23 to see if any improvement with holding in  - he's been waiting for a call back from his pharmacy about when the Humira is going to be delivered, plans to also log into account to see if any updates are there  - he states understanding and agrees to this plan

## 2024-02-20 NOTE — TELEPHONE ENCOUNTER
----- Message from Jolynn Chicas RN sent at 2/15/2024  9:38 AM CST -----  Pred taper 20mg 2/6    Canasa qHS 2/15  Humira start?  RN check 1 wk

## 2024-02-23 NOTE — TELEPHONE ENCOUNTER
Spoke with Dharmesh:    - tolerating Canasa much better since advancing further into rectum, sleeping through the night with no noc BM  - will f/u for update to determine Prednisone taper next week

## 2024-02-27 ENCOUNTER — TELEPHONE (OUTPATIENT)
Dept: GASTROENTEROLOGY | Facility: CLINIC | Age: 49
End: 2024-02-27
Payer: COMMERCIAL

## 2024-02-27 NOTE — TELEPHONE ENCOUNTER
Spoke with Dharmesh:  IBD meds:  - Humira - has not started, needs to apply to the assistance program, reviewed how this works and the substantial savings he will have, plans to call tomorrow am  - Lynnette VASQUEZ, no issues    Prednisone 15mg/d (shmtmjq97/2023; tapered from 30mg/d  2/7, 20mg/d 2/13, 15mg/d 2/20)     - 4 BM/d, loose to formed, + blood in 2 stools/d, + mucous each, 2-4 false trips/d +gas, intermittent stinging rectal pain from fissure 5-6/10  - denies noc BM  - overall, feels the same as a week ago, although reporting increased BM/d and blood  - he questioned if the Prednisone tapering was d/t the upcoming MRI, explained the importance of weaning off Prednisone as tolerated to reduce system effects, he states understanding    Dr. Bhat  will be updated.

## 2024-02-27 NOTE — TELEPHONE ENCOUNTER
Called pt and discussed symptoms.     Plan  - increase prednisone to 20 mg daily  - continue canasa supp qhs  - will f/u on humira per RN note  - RN to check on pt in 1 week- 3/5

## 2024-03-05 ENCOUNTER — TELEPHONE (OUTPATIENT)
Dept: GASTROENTEROLOGY | Facility: CLINIC | Age: 49
End: 2024-03-05
Payer: COMMERCIAL

## 2024-03-11 ENCOUNTER — HOSPITAL ENCOUNTER (OUTPATIENT)
Dept: RADIOLOGY | Facility: HOSPITAL | Age: 49
Discharge: HOME | End: 2024-03-11
Attending: INTERNAL MEDICINE
Payer: COMMERCIAL

## 2024-03-11 DIAGNOSIS — K50.913 PERIANAL FISTULA DUE TO CROHN'S DISEASE: ICD-10-CM

## 2024-03-11 PROCEDURE — 25500020 PHARM REV CODE 255: Performed by: INTERNAL MEDICINE

## 2024-03-11 PROCEDURE — 72197 MRI PELVIS W/O & W/DYE: CPT | Mod: 26,,, | Performed by: STUDENT IN AN ORGANIZED HEALTH CARE EDUCATION/TRAINING PROGRAM

## 2024-03-11 PROCEDURE — 72197 MRI PELVIS W/O & W/DYE: CPT | Mod: TC

## 2024-03-11 PROCEDURE — A9585 GADOBUTROL INJECTION: HCPCS | Performed by: INTERNAL MEDICINE

## 2024-03-11 RX ORDER — GADOBUTROL 604.72 MG/ML
10 INJECTION INTRAVENOUS
Status: COMPLETED | OUTPATIENT
Start: 2024-03-11 | End: 2024-03-11

## 2024-03-11 RX ADMIN — GADOBUTROL 10 ML: 604.72 INJECTION INTRAVENOUS at 06:03

## 2024-03-12 ENCOUNTER — TELEPHONE (OUTPATIENT)
Dept: GASTROENTEROLOGY | Facility: CLINIC | Age: 49
End: 2024-03-12
Payer: COMMERCIAL

## 2024-03-12 DIAGNOSIS — K50.10 CROHN'S DISEASE OF COLON WITHOUT COMPLICATION: Primary | ICD-10-CM

## 2024-03-12 RX ORDER — PREDNISONE 10 MG/1
20 TABLET ORAL DAILY
Qty: 14 TABLET | Refills: 0 | Status: SHIPPED | OUTPATIENT
Start: 2024-03-12 | End: 2024-03-19

## 2024-03-12 NOTE — TELEPHONE ENCOUNTER
Spoke with Dharmesh:  IBD meds:  - Humira - states the [employer] doesn't have funding for dx IBD. He will fill out form for Zahira alex. The co-pay assistance is what is holding up the release of the Juanjoseira.  - Canasa HS    ** today is day 3 without Prednisone, expecting to come in the mail any day ** Rx sent to local Pharmacy    Prednisone 20mg/d (zvqucaz34/2023; tapered from 30mg/d  2/7, 20mg/d 2/13, 15mg/d 2/20, 20mg/d 2/27, 20mg 3/5)      - 3-6 BM/d, mush to soft formed  - 1 false trip w gas yesterday  - continued rectal pain improved some but would not quantify  - denies blood, mucous, and noc BM  - reports weakness that has started today and feels this is progressively getting worse  - denies fatigue, anorexia, nausea/vomiting, constipation, abd pain, diarrhea, salt craving, dizziness, muscle joint pains  - strongly advised he contact us before running out of Prednisone in the future as it can be very dangerous to abruptly stop this especially after being on it for several months  - he states understanding    Dr. Bhat will be updated.

## 2024-03-12 NOTE — TELEPHONE ENCOUNTER
Called [facility] (Phone: [phone])-  Rep Taylor- Test claim for starter kit rejects for PA needed. Test claim for maintenance dose goes through for $3,183.05.  No copay assistance on file.

## 2024-03-12 NOTE — TELEPHONE ENCOUNTER
----- Message from Jolynn Chicas, RN sent at 3/8/2024  7:57 AM CST -----  IBD meds:   - Humira - has not started, needs to apply to the assistance program, reviewed how this works and the substantial savings he will have, plans to call tomorrow am   - Lynnette VASQUEZ, no issues      Prednisone 20mg/d (cvcxqrm24/2023; tapered from 30mg/d  2/7, 20mg/d 2/13, 15mg/d 2/20, 20mg/d 2/27, 20mg 3/5)

## 2024-03-12 NOTE — TELEPHONE ENCOUNTER
Allergies reviewed, Rx pended for approval    OV: 3/22/24     Pt has been out of this med for 3 days. Supposed to be coming in the mail. Short supply to have on hand.

## 2024-03-13 NOTE — TELEPHONE ENCOUNTER
Called [facility] de[facility] ([phone])- Rep Colleen RUBIO confirmed Humira starter kit PA  (PA approval letter in media tab states Humira is approved through 25 but the starter kit PA has actually  + only maintenance is approved through 25).  Rep adjusted starter kit PA end date to 3/18/24. Rep did test claim and confirmed [facility] will get paid test claim for starter kit. Maintenance dose PA will start 3/19/24 and ends 25.     Called [facility] ([phone])- Rep Stephanie- starter kit test claim goes through but rep stated she is not allowed to state copay amount.  Rep states there are no notes about [facility] suggesting for patient to apply for a daniel. Rep Stephanie confirmed that [facility] would recommended for pt to sign up for copay card given he has commercial insurance.    Spoke with pt- Advised him that starter kit PA dates have been updated and so that [facility] will get paid claim for the starter kit.  Pt will sign up for Humira copay card today and call [facility] back with processing info.  Pt will send [employer] message or call the office if any trouble with this process.  He agreed to send [employer] message to confirm that everything went well with this process.  Patient had no questions.

## 2024-03-15 NOTE — TELEPHONE ENCOUNTER
Spoke with pt-  his Humira starter kit will be delivered today.  His copay was $0 with the copay card assistance.  Patient plans to start Humira today.  Patient declined the offer for in-person injection counseling.  Reviewed Humira dosing and Humira pen injection steps.  Patient had no questions.      Patient has apt with Dr Bhat next week 3/22/24.

## 2024-03-20 ENCOUNTER — TELEPHONE (OUTPATIENT)
Dept: GASTROENTEROLOGY | Facility: CLINIC | Age: 49
End: 2024-03-20
Payer: COMMERCIAL

## 2024-03-20 NOTE — TELEPHONE ENCOUNTER
Pt transferred to direct line  - States he took MTX dose while on the phone w/ me earlier this afternoon  - Instructed to not take additional doses until plan discussed further in clinic  - Pt expressed understanding

## 2024-03-20 NOTE — TELEPHONE ENCOUNTER
LM for callback [id number]  - Instructed pt to not take MTX d/t recent LFT elevation & plan regarding this TBD at 3/22/24 f/u  - Instructed to inject Humira today

## 2024-03-20 NOTE — TELEPHONE ENCOUNTER
----- Message from Prachi MAHMOOD Route sent at 3/20/2024  2:52 PM CDT -----  Regarding: Medication  Contact: pt. [phone]  Pt is calling to speak with Jolynn. He says he was told to reach out when he received his medication HUMIRA. Pt also wants to confirm if he is to take medication methotrexate 2.5 MG Tabnow that he has received the HUMIRA.  Patient Requesting Call Back @  [phone]

## 2024-03-22 ENCOUNTER — TELEPHONE (OUTPATIENT)
Dept: GASTROENTEROLOGY | Facility: CLINIC | Age: 49
End: 2024-03-22
Payer: COMMERCIAL

## 2024-03-22 ENCOUNTER — HOSPITAL ENCOUNTER (OUTPATIENT)
Dept: RADIOLOGY | Facility: HOSPITAL | Age: 49
Discharge: HOME | End: 2024-03-22
Attending: INTERNAL MEDICINE
Payer: COMMERCIAL

## 2024-03-22 ENCOUNTER — OFFICE VISIT (OUTPATIENT)
Dept: GASTROENTEROLOGY | Facility: CLINIC | Age: 49
End: 2024-03-22
Payer: COMMERCIAL

## 2024-03-22 VITALS
WEIGHT: 229.06 LBS | OXYGEN SATURATION: 98 % | TEMPERATURE: 98 F | HEART RATE: 78 BPM | BODY MASS INDEX: 29.4 KG/M2 | HEIGHT: 74 IN | DIASTOLIC BLOOD PRESSURE: 94 MMHG | SYSTOLIC BLOOD PRESSURE: 134 MMHG

## 2024-03-22 DIAGNOSIS — R74.8 ELEVATED LIVER ENZYMES: Primary | ICD-10-CM

## 2024-03-22 DIAGNOSIS — K50.10 CROHN'S DISEASE OF COLON WITHOUT COMPLICATION: ICD-10-CM

## 2024-03-22 DIAGNOSIS — R74.8 ELEVATED LIVER ENZYMES: ICD-10-CM

## 2024-03-22 PROCEDURE — 99215 OFFICE O/P EST HI 40 MIN: CPT | Mod: S$GLB,,, | Performed by: INTERNAL MEDICINE

## 2024-03-22 PROCEDURE — G2211 COMPLEX E/M VISIT ADD ON: HCPCS | Mod: S$GLB,,, | Performed by: INTERNAL MEDICINE

## 2024-03-22 PROCEDURE — 76705 ECHO EXAM OF ABDOMEN: CPT | Mod: TC

## 2024-03-22 PROCEDURE — 76705 ECHO EXAM OF ABDOMEN: CPT | Mod: 26,,, | Performed by: STUDENT IN AN ORGANIZED HEALTH CARE EDUCATION/TRAINING PROGRAM

## 2024-03-22 RX ORDER — PREDNISONE 10 MG/1
TABLET ORAL
COMMUNITY
Start: 2023-12-29 | End: 2024-06-14 | Stop reason: ALTCHOICE

## 2024-03-22 NOTE — Clinical Note
Is there anything we can use to help people get supp further into rectum?  If so or not please email pt with any suggestions. He is on canasa supp

## 2024-03-22 NOTE — PROGRESS NOTES
"     [facility]             Inflammatory Bowel Disease   Follow-up  Note              TODAY'S VISIT DATE:  3/22/2024    Chief Complaint:   Chief Complaint   Patient presents with    Crohn's Disease     PCP: Unable, To Obtain    Previous History:  Dharmesh Lozoya is a 48 y.o. male with Crohn's disease (pancolonic, perianal fistula), history of C diff (neg 12/2022, 5/2023).  Patient was doing well until 2015 and developed blood in his stools and saw family medicine doctor in [place] and had a flex sig and diagnosed "colitis."  He then had a colonoscopy by a GI doctor in [place] and recalls being told distal colitis and started on medication that was too expensive and took for 30 days (sounds like apriso) but too expensive and not effective so discontinued. At that time he did not have insurance so he proceeded with dietary changes (more fish, turmeric).  Within a few weeks symptoms resolved and back to baseline normal BMs 4-5 variable consistency but no blood and able to go back to normal lifestyle. In 2017 he moved from [place] to [place] and in 12/2022 began with worsening bloody diarrhea, cramping and weight loss. He was diagnosed with C diff and vancomycin started which helped significant with his symptoms. His symptoms went from 10-15 BMs/d to 8-12 BMs/d after oral vancomycin.   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  He was then placed on lialda 3.6 g/d with entocort 9 mg/d (decreased to 6 mg/d 3/2023) which he took for 1 month with some response and then discontinued due to ineffectiveness. He was hospitalized 3/2023 at [facility] for 1 days due to significant bloody diarrhea with weakness and dehydration and he had " ran out of entocort a week before and was off of lialda by that time. He was discharged home on prednisone 40 mg daily for 2 weeks and advised to f/u with his GI doctor. In 3/2023 he noticed abscess which spontaneously drained and also at that time had issues with hemorrhoids He then started stelara 4/12/23. In 5/2023 pt had good response in regards to bleeding and stool frequency and at that time C diff recurrent and treated with vancomycin x 10 days.  Approximately about 6 weeks after starting IV stelara noticed initially small pruritic blisters between toes and then progressed to head, arms, legs, feet, toes. These lesions were treated symptomatically with hydroxyzine and he took athlete's foot powder and then eventually got topical cream for horses that helped (antifungal/antibacterial). Diet he found if dairy, spicy foods, red sauce exacerbated symptoms.  Colonoscopy 12/29/23 significant for inflammation with congestion, erosions, friability and mucus in a contiuous and circumferential pattern from anus to sigmoid c/w moderate inflammation (hernandez score 3)-bx c/w moderate active inflammation, CMV neg. Perianal exam at time of scope c/w palpable lump c/w fistula with drainage. He discontinued stelara 12/6/24 due to rash which resolved after discontinutaiton.  In 12/2023 pt started on oral prednisone 40 mg daily for 3 weeks and then decreased to 30 mg/d around 1/20/24 and continued on this when he was initially seen in IBD clinic 2/6/24 at which time he was having 4-5 soft to formed BMs/d.   Currently he has 4-5 soft to formed BMs/d, no blood, no urgency, no nocturnal BMs (AM gas) and this has improved since first week of Jan 2024.    Interval History:  - current IBD meds: Prednisone 20mg/d (started 12/2023; tapered from 30mg/d  2/7, 20mg/d 2/13, 15mg/d 2/20, 20mg/d 2/27, 20mg 3/5), Humira (started 3/20/24, ND 80 mg SC 4/3 the 40 mg SC q 2 weeks), canasa supp (about 25% compliant)  - recent IBD meds- took oral MTX  "yesterday but then stopped per our instructions due to elevated ALT  - elevated ALT- no tylenol, new meds or supplements and rare alcohol though has had some weight gain  - variable BMS- eats more food  more food 6-8 BMs/d, 3-5 BMs/d- strain- hard stool, yest AM at 2 am straining- had 5 times yesterday soft, AM strain  - 3/11/24 MRI pelvis: Left intersphincteric perianal fistula at the left lateral approximate 4 o'clock position, measuring approximately 3.8 cm in length.  One suspected point of exit about the left gluteal fold.  Additional suspected blind-ending component about the left gluteal soft tissues.  Suspected granulation tissue about the visualized perianal fistula.  Diffuse rectal mucosal hyperenhancement and wall thickening, as well as inflammatory change about the perirectal fat and multiple prominent perirectal lymph nodes.Prostatomegaly.   Possible right-sided varicocele.    - NSAID use: No  - Narcotic use: No  - Alternative/complementary meds for IBD:   No    Prior Pertinent Surgeries:   None    Last pertinent Endoscopy/Imagin23 colonoscopy:  inflammation with congestion, erosions, friability ad mucus in a contiuous and circumferential pattern from anus to sigmoid c/w moderate inflammation (hernandez score 3). Scope not complete due to degree of inflammation. Biopsies showed rectum and sigmoid with moderately active inflammation with ulceration, CMV negative. On colonoscopy there was fistula with palpable lump "peanut" size with drainage.  3/11/24 MRI pelvis: Left intersphincteric perianal fistula at the left lateral approximate 4 o'clock position, measuring approximately 3.8 cm in length.  One suspected point of exit about the left gluteal fold.  Additional suspected blind-ending component about the left gluteal soft tissues.  Suspected granulation tissue about the visualized perianal fistula.  Diffuse rectal mucosal hyperenhancement and wall thickening, as well as inflammatory change about the " perirectal fat and multiple prominent perirectal lymph nodes.Prostatomegaly.  Possible right-sided varicocele.      Therapeutic Drug Monitoring Labs:  NA    Prior IBD Therapies:  Oral mesalamine (unclear which one but seems like apriso)- ineffective but only took for 4 weeks  lialda 3.6 g/d   Entocort 9 mg/d- ineffective    Vaccinations:  Lab Results   Component Value Date    HEPBSURFABQU Negative 02/06/2024    HEPBSURFABQU <3 02/06/2024     Lab Results   Component Value Date    HEPAIGG Non-reactive 02/06/2024     Lab Results   Component Value Date    VARICELLAZOS 1030.00 02/06/2024    VARICELLAINT Positive 02/06/2024     Lab Results   Component Value Date    MUMPSIGGSCRE <5.00 02/06/2024    MUMPSIGGINTE Negative 02/06/2024      Lab Results   Component Value Date    RUBEOLAIGGAN 64.20 02/06/2024    RUBEOLAINTER Positive 02/06/2024     Flu shot: recommended yearly   COVID vaccine/booster:  per CDC recommendations  RSV: after age 59 yo  Tetanus (Tdap):  last tetanus was over 10 years ago  PPSV 20: will give at future visit after stopping steroids   HPV: NA     Meningococcal: NA  Hepatitis B: will give at future visit after stopping steroids   Hepatitis A:   will give at future visit after stopping steroids   MMR (live vaccine): not immune to mumps, immune to rubella      Shingrix: will give at future visit after stopping steroids     Review of Systems   Constitutional:  Negative for chills, fever and weight loss.   HENT:          No oral ulcers, dysphagia, oral thrush   Eyes:  Negative for blurred vision, pain and redness.   Respiratory:  Negative for cough and shortness of breath.    Cardiovascular:  Negative for chest pain.   Gastrointestinal:  Negative for abdominal pain, heartburn, nausea and vomiting.   Genitourinary:  Negative for dysuria and hematuria.   Musculoskeletal:  Negative for back pain and joint pain.   Skin:  Negative for rash.   Psychiatric/Behavioral:  Negative for depression. The patient is not  "nervous/anxious and does not have insomnia.      All Medical History/Surgical History/Family History/Social History/Allergies have been reviewed and updated in EMR    Outpatient Medications Marked as Taking for the 3/22/24 encounter (Office Visit) with Tianna Bhat MD   Medication Sig Dispense Refill    adalimumab (HUMIRA,CF, [facility]) 80 mg/0.8 mL PnKt Inject 0.8 mLs (80 mg total) into the skin As instructed. 160 mg week 0, 80 mg week 2 3 pen 0    mesalamine (CANASA) 1000 MG Supp Place 1 suppository (1,000 mg total) rectally nightly. 30 suppository 1    predniSONE (DELTASONE) 10 MG tablet 100       Vital Signs:  BP (!) 134/94   Pulse 78   Temp 97.9 °F (36.6 °C) (Skin)   Ht 6' 2" (1.88 m)   Wt 103.9 kg (229 lb 0.9 oz)   SpO2 98%   BMI 29.41 kg/m²    Physical Exam  Abdominal:      General: Bowel sounds are normal. There is no distension.      Palpations: Abdomen is soft.      Tenderness: There is no abdominal tenderness.     Labs:   Lab Results   Component Value Date    CRP 1.5 02/06/2024     Lab Results   Component Value Date    HEPBSAG Non-reactive 02/06/2024    HEPBCAB Non-reactive 02/06/2024     Lab Results   Component Value Date    TBGOLDPLUS Negative 02/06/2024     Lab Results   Component Value Date    VFARTZXN92XC 23 (L) 02/06/2024    NRBBELUV36 348 02/06/2024     Lab Results   Component Value Date    WBC 12.8 (H) 02/09/2024    HGB 11.4 (L) 02/09/2024    HCT 38.5 02/09/2024    MCV 78 (L) 02/09/2024     02/09/2024     Lab Results   Component Value Date    CREATININE 1.08 02/09/2024    ALBUMIN 4.0 (L) 02/09/2024    BILITOT 0.3 02/09/2024    ALKPHOS 66 02/06/2024    ALKPHOS 66 02/06/2024    AST 37 02/09/2024    ALT 61 (H) 02/09/2024     Assessment/Plan:  Dharmesh Lozoya is a 48 y.o. male with Crohn's disease (pan-colonic, perianal fistula), recurrent C diff (12/2022, 5/2023), elevated LFTs (ALT).     Patient has been on oral prednisone since 12/2023 and we have been decreasing his dose of " prednisone slowly as he has started on humira 3/20/24.  MRI pelvis significant for left intersphincteric perianal fistula and rectal inflammation.  I advised him due to this to try to be more c/w canasa supp which he will try.  He will continue taper prednisone with a plan to stop by 5/15/24.     # Crohn's disease (pan-colonic, perianal fistula):    - proceed with dose of humira 80 mg 4/3 then 40 mg every other Wed starting 4/17  - will not start MTX due to elevated LFTs but may consider depending on workup  - decrease prednisone to 15 mg daily on 4/3, 10 mg daily 4/17, 5 mg daily 5/1, stop prednisone 5/15  - rectal inflammation with fistula- continue canasa- pt to try to take more consistently, can consider cipro/flagyl to help inflammation and fistula if unable to hold in canasa  - pt to let us know if any worsening diarrhea at anytime  - drug monitoring labs: CBC/CMP q 6 mos (8/2023) TPMT (normal 2/2024), TB quantiferon (2/2025), Hep B testing (2/2025)  - TDM:  trough ADA level week 8- 5/28/24-[facility]    # Elevated ALT  - no new meds, tylenol, herbal supplements, rare alcohol, has had weight gain  - serological w/u neg but ASMA not done so will order this today  - RUQ US  - repeat LFTs today    # Prostate- MRI pelvis showed prostatomegaly and possible right sided varicocele  - will consider urology referral at later date     # Immunodeficiency due to long term immunosuppressive drug therapy and IBD specific health maintenance:  CRC risk- sx 2015, pancolonic, surveillance colonoscopy q 1-2 years  Skin exam- yearly  Risk for osteopenia/osteoporosis- on prednisone, will recommend calcium/vit D  Vitamin D deficiency- will recommend vit D3 5000- 2 pills daily for 3 mos and then 1 pill daily  Vaccines- no live vaccines, will update vaccines once he is off of prednisone     I spent a total of 45 minutes on the day of the visit.This includes face to face time and on-face to face time preparing to see the patient (eg,  review of tests, notes), obtaining and/or reviewing separately obtained history, documenting clinical information in the electronic or other health record, independently interpreting results and communicating results to the patient/family/caregiver, and coordinating care.     Visit today is associated with current or anticipated ongoing medical care related to this patient's single serious condition/complex condition Crohn's disease.    Follow up in about 3 months (around 6/22/2024) for virtual Wed AM.    Tianna Bhat MD  Department of Gastroenterology  Medical Director, Inflammatory Bowel Disease

## 2024-03-22 NOTE — Clinical Note
Jolynn Please check on him in 2 weeks as discussed If unable to hold canasa or any worsening symptoms as tapering off of prednisone I can consider course of cipro/flagyl

## 2024-03-22 NOTE — PATIENT INSTRUCTIONS
- proceed with dose of humira 80 mg 4/3 then 40 mg every other Wed starting 4/17  - decrease prednisone to 15 mg daily on 4/3, 10 mg daily 4/17, 5 mg daily 5/1, stop prednisone 5/15  - if at any point you have any worsening diarrhea let us know immediately   - continue canasa and try to hold in and take consistently  - US of liver today  - labs today  - on 5/28/24- go to [facility] and get your humira levels and repeat liver tests done

## 2024-03-27 ENCOUNTER — TELEPHONE (OUTPATIENT)
Dept: GASTROENTEROLOGY | Facility: CLINIC | Age: 49
End: 2024-03-27
Payer: COMMERCIAL

## 2024-03-27 NOTE — TELEPHONE ENCOUNTER
----- Message from Tianna Bhat MD sent at 3/22/2024  9:25 AM CDT -----  Is there anything we can use to help people get supp further into rectum?  If so or not please email pt with any suggestions. He is on canasa supp

## 2024-04-02 ENCOUNTER — TELEPHONE (OUTPATIENT)
Dept: GASTROENTEROLOGY | Facility: CLINIC | Age: 49
End: 2024-04-02
Payer: COMMERCIAL

## 2024-04-02 NOTE — TELEPHONE ENCOUNTER
Spoke with Dharmesh:  IBD meds:  - Humira 40mg q2w; LD (#1) 3/20, ND: 4/3 (#2), maint #1 dose 4/17  - Lynnette VASQUEZ (awaiting reply re: compliance)    Prednisone 20mg/d (yxfgelc23/2023; tapered from 30mg/d 2/7, 20mg/d 2/13, 15mg/d 2/20, 20mg/d 2/27, 20mg 3/5)     - 3-4 BM/d, formed, +mucous  - 2 false trips (passes gas)  - 1-2 noc BM - no BM but wakes to feel urge and only passes gas, this is improvement from prior to Humira he was having nightly BM  - he denies blood and pain  - per plan of care 3/22, he was instructed to reduce Prednisone by 5mg with each Humira injection. He plans to reduce to 15mg/d tomorrow.     Dr. Bhat will be updated.

## 2024-04-02 NOTE — TELEPHONE ENCOUNTER
----- Message from Jolynn Chicas, RN sent at 3/22/2024  3:47 PM CDT -----  IBD meds:  - Humira 40mg q2w; LD (#1) 3/20, ND: 4/3 (#2), maint #1 dose 4/17  - Canasa HS - is he doing this regularly? Fistulizing disease, needs to do    Prednisone 20mg/d (muldofs08/2023; tapered from 30mg/d  2/7, 20mg/d 2/13, 15mg/d 2/20, 20mg/d 2/27, 20mg 3/5)     - Dr. Bhat told him to reduce Pred by 5mg every time he takes the Humira. After this check on him, if he's compliant and doing well, ask her if she's ok letting him continue to taper without us checking

## 2024-05-27 ENCOUNTER — PATIENT MESSAGE (OUTPATIENT)
Dept: GASTROENTEROLOGY | Facility: CLINIC | Age: 49
End: 2024-05-27
Payer: COMMERCIAL

## 2024-05-27 DIAGNOSIS — K50.10 CROHN'S DISEASE OF COLON WITHOUT COMPLICATION: Primary | ICD-10-CM

## 2024-06-04 LAB
ADALIMUMAB AB SERPL-MCNC: 432 NG/ML
ADALIMUMAB SERPL-MCNC: <0.6 UG/ML

## 2024-06-05 ENCOUNTER — TELEPHONE (OUTPATIENT)
Dept: GASTROENTEROLOGY | Facility: CLINIC | Age: 49
End: 2024-06-05
Payer: COMMERCIAL

## 2024-06-05 NOTE — TELEPHONE ENCOUNTER
----- Message from Tianna Bhat MD sent at 6/4/2024  4:08 PM CDT -----  Please call and discuss with patient.   His humira levels are undetectable with high antibodies.   Please confirm his injection schedule  Will need to discontinue humira  Next appt virtual but we need to see him together for pharm D/MD appt so please schedule within next 1-2 weeks    Thanks  SS  ----- Message -----  From: Shivani Goss  Sent: 6/4/2024   6:11 AM CDT  To: Tianna Bhat MD

## 2024-06-05 NOTE — TELEPHONE ENCOUNTER
Called and spoke with pt  - reviewed ADM undetectable drug levels and high antibodies   - pt reports not noticing any improvement in symptoms since starting humira  - advised to stop humira as he is not getting any benefits from it  - will reschedule his appt from 6/26 VV to 6/14 at 9:30 AM with MD/PharmD to further discuss treatment options   - patient verbalized understanding instructions

## 2024-06-06 NOTE — TELEPHONE ENCOUNTER
- Crohn's Disease (pancolonic w/ perianal fistula) & h/o recurrent C. Diff  - 2/9/24 calprotectin: 104  - 3/22/24 OV Plan: Consistent Canasa use d/t rectal inflammation & if unable to hold in consider cipro/flagyl  - PharmD/MD f/u: 6/14/24    - Current IBD meds: none  - Prior IBD treatment: Canasa (self d/c'd ~1 month ago d/t not being able to hold in), Humira (LD: 5/29- d/c due to low lvls & high abs)  - Prednisone taper (12/2023-5/29/24)    - Reports worsening symptoms since stopping prednisone  - 8 liquid- chunky BM/ QD w/ intermittent blood either mixed in the stool or clots; 2-3 episodes of tenesmus/ QD & 2 nocturnal BM/ QD (4/2 update: 3-4 formed BM/ QD w/ 2 episodes of tenesmus & 1-2 nocturnal trips)  - Sharp, lower abdominal pain 5/10 w/ urge for a BM  - Will update Dr. Bhat

## 2024-06-14 ENCOUNTER — LAB VISIT (OUTPATIENT)
Dept: LAB | Facility: HOSPITAL | Age: 49
End: 2024-06-14
Attending: INTERNAL MEDICINE
Payer: COMMERCIAL

## 2024-06-14 ENCOUNTER — OFFICE VISIT (OUTPATIENT)
Dept: GASTROENTEROLOGY | Facility: CLINIC | Age: 49
End: 2024-06-14
Payer: COMMERCIAL

## 2024-06-14 VITALS
WEIGHT: 220.88 LBS | BODY MASS INDEX: 28.35 KG/M2 | SYSTOLIC BLOOD PRESSURE: 105 MMHG | DIASTOLIC BLOOD PRESSURE: 71 MMHG | TEMPERATURE: 99 F | HEIGHT: 74 IN | OXYGEN SATURATION: 98 % | HEART RATE: 78 BPM

## 2024-06-14 DIAGNOSIS — K50.10 CROHN'S DISEASE OF COLON WITHOUT COMPLICATION: ICD-10-CM

## 2024-06-14 DIAGNOSIS — Z79.899 IMMUNODEFICIENCY DUE TO LONG TERM IMMUNOSUPPRESSIVE DRUG THERAPY: ICD-10-CM

## 2024-06-14 DIAGNOSIS — K50.913 PERIANAL FISTULA DUE TO CROHN'S DISEASE: ICD-10-CM

## 2024-06-14 DIAGNOSIS — K50.10 CROHN'S DISEASE OF COLON WITHOUT COMPLICATION: Primary | ICD-10-CM

## 2024-06-14 DIAGNOSIS — D84.821 IMMUNODEFICIENCY DUE TO LONG TERM IMMUNOSUPPRESSIVE DRUG THERAPY: ICD-10-CM

## 2024-06-14 DIAGNOSIS — T45.1X5A IMMUNODEFICIENCY DUE TO LONG TERM IMMUNOSUPPRESSIVE DRUG THERAPY: ICD-10-CM

## 2024-06-14 LAB
ALBUMIN SERPL BCP-MCNC: 3.4 G/DL (ref 3.5–5.2)
ALBUMIN SERPL BCP-MCNC: 3.4 G/DL (ref 3.5–5.2)
ALP SERPL-CCNC: 66 U/L (ref 55–135)
ALP SERPL-CCNC: 66 U/L (ref 55–135)
ALT SERPL W/O P-5'-P-CCNC: 15 U/L (ref 10–44)
ALT SERPL W/O P-5'-P-CCNC: 15 U/L (ref 10–44)
ANION GAP SERPL CALC-SCNC: 10 MMOL/L (ref 8–16)
ANION GAP SERPL CALC-SCNC: 10 MMOL/L (ref 8–16)
AST SERPL-CCNC: 19 U/L (ref 10–40)
AST SERPL-CCNC: 19 U/L (ref 10–40)
BASOPHILS # BLD AUTO: 0.03 K/UL (ref 0–0.2)
BASOPHILS # BLD AUTO: 0.03 K/UL (ref 0–0.2)
BASOPHILS NFR BLD: 0.3 % (ref 0–1.9)
BASOPHILS NFR BLD: 0.3 % (ref 0–1.9)
BILIRUB SERPL-MCNC: 0.7 MG/DL (ref 0.1–1)
BILIRUB SERPL-MCNC: 0.7 MG/DL (ref 0.1–1)
BUN SERPL-MCNC: 9 MG/DL (ref 6–20)
BUN SERPL-MCNC: 9 MG/DL (ref 6–20)
CALCIUM SERPL-MCNC: 8.9 MG/DL (ref 8.7–10.5)
CALCIUM SERPL-MCNC: 8.9 MG/DL (ref 8.7–10.5)
CHLORIDE SERPL-SCNC: 104 MMOL/L (ref 95–110)
CHLORIDE SERPL-SCNC: 104 MMOL/L (ref 95–110)
CHOLEST SERPL-MCNC: 153 MG/DL (ref 120–199)
CHOLEST/HDLC SERPL: 4.8 {RATIO} (ref 2–5)
CO2 SERPL-SCNC: 25 MMOL/L (ref 23–29)
CO2 SERPL-SCNC: 25 MMOL/L (ref 23–29)
CREAT SERPL-MCNC: 1.2 MG/DL (ref 0.5–1.4)
CREAT SERPL-MCNC: 1.2 MG/DL (ref 0.5–1.4)
CRP SERPL-MCNC: 24.6 MG/L (ref 0–8.2)
DIFFERENTIAL METHOD BLD: ABNORMAL
DIFFERENTIAL METHOD BLD: ABNORMAL
EOSINOPHIL # BLD AUTO: 0.3 K/UL (ref 0–0.5)
EOSINOPHIL # BLD AUTO: 0.3 K/UL (ref 0–0.5)
EOSINOPHIL NFR BLD: 2.8 % (ref 0–8)
EOSINOPHIL NFR BLD: 2.8 % (ref 0–8)
ERYTHROCYTE [DISTWIDTH] IN BLOOD BY AUTOMATED COUNT: 16.2 % (ref 11.5–14.5)
ERYTHROCYTE [DISTWIDTH] IN BLOOD BY AUTOMATED COUNT: 16.2 % (ref 11.5–14.5)
EST. GFR  (NO RACE VARIABLE): >60 ML/MIN/1.73 M^2
EST. GFR  (NO RACE VARIABLE): >60 ML/MIN/1.73 M^2
GLUCOSE SERPL-MCNC: 85 MG/DL (ref 70–110)
GLUCOSE SERPL-MCNC: 85 MG/DL (ref 70–110)
HCT VFR BLD AUTO: 40.6 % (ref 40–54)
HCT VFR BLD AUTO: 40.6 % (ref 40–54)
HDLC SERPL-MCNC: 32 MG/DL (ref 40–75)
HDLC SERPL: 20.9 % (ref 20–50)
HGB BLD-MCNC: 12.4 G/DL (ref 14–18)
HGB BLD-MCNC: 12.4 G/DL (ref 14–18)
IMM GRANULOCYTES # BLD AUTO: 0.04 K/UL (ref 0–0.04)
IMM GRANULOCYTES # BLD AUTO: 0.04 K/UL (ref 0–0.04)
IMM GRANULOCYTES NFR BLD AUTO: 0.4 % (ref 0–0.5)
IMM GRANULOCYTES NFR BLD AUTO: 0.4 % (ref 0–0.5)
LDLC SERPL CALC-MCNC: 105.8 MG/DL (ref 63–159)
LYMPHOCYTES # BLD AUTO: 1.2 K/UL (ref 1–4.8)
LYMPHOCYTES # BLD AUTO: 1.2 K/UL (ref 1–4.8)
LYMPHOCYTES NFR BLD: 12.9 % (ref 18–48)
LYMPHOCYTES NFR BLD: 12.9 % (ref 18–48)
MCH RBC QN AUTO: 24.2 PG (ref 27–31)
MCH RBC QN AUTO: 24.2 PG (ref 27–31)
MCHC RBC AUTO-ENTMCNC: 30.5 G/DL (ref 32–36)
MCHC RBC AUTO-ENTMCNC: 30.5 G/DL (ref 32–36)
MCV RBC AUTO: 79 FL (ref 82–98)
MCV RBC AUTO: 79 FL (ref 82–98)
MONOCYTES # BLD AUTO: 1.5 K/UL (ref 0.3–1)
MONOCYTES # BLD AUTO: 1.5 K/UL (ref 0.3–1)
MONOCYTES NFR BLD: 15.9 % (ref 4–15)
MONOCYTES NFR BLD: 15.9 % (ref 4–15)
NEUTROPHILS # BLD AUTO: 6.4 K/UL (ref 1.8–7.7)
NEUTROPHILS # BLD AUTO: 6.4 K/UL (ref 1.8–7.7)
NEUTROPHILS NFR BLD: 67.7 % (ref 38–73)
NEUTROPHILS NFR BLD: 67.7 % (ref 38–73)
NONHDLC SERPL-MCNC: 121 MG/DL
NRBC BLD-RTO: 0 /100 WBC
NRBC BLD-RTO: 0 /100 WBC
PLATELET # BLD AUTO: 410 K/UL (ref 150–450)
PLATELET # BLD AUTO: 410 K/UL (ref 150–450)
PMV BLD AUTO: 11.7 FL (ref 9.2–12.9)
PMV BLD AUTO: 11.7 FL (ref 9.2–12.9)
POTASSIUM SERPL-SCNC: 3.5 MMOL/L (ref 3.5–5.1)
POTASSIUM SERPL-SCNC: 3.5 MMOL/L (ref 3.5–5.1)
PROT SERPL-MCNC: 7.4 G/DL (ref 6–8.4)
PROT SERPL-MCNC: 7.4 G/DL (ref 6–8.4)
RBC # BLD AUTO: 5.13 M/UL (ref 4.6–6.2)
RBC # BLD AUTO: 5.13 M/UL (ref 4.6–6.2)
SODIUM SERPL-SCNC: 139 MMOL/L (ref 136–145)
SODIUM SERPL-SCNC: 139 MMOL/L (ref 136–145)
TRIGL SERPL-MCNC: 76 MG/DL (ref 30–150)
WBC # BLD AUTO: 9.5 K/UL (ref 3.9–12.7)
WBC # BLD AUTO: 9.5 K/UL (ref 3.9–12.7)

## 2024-06-14 PROCEDURE — G2211 COMPLEX E/M VISIT ADD ON: HCPCS | Mod: S$GLB,,, | Performed by: INTERNAL MEDICINE

## 2024-06-14 PROCEDURE — 90472 IMMUNIZATION ADMIN EACH ADD: CPT | Mod: S$GLB,,, | Performed by: INTERNAL MEDICINE

## 2024-06-14 PROCEDURE — 85025 COMPLETE CBC W/AUTO DIFF WBC: CPT | Performed by: INTERNAL MEDICINE

## 2024-06-14 PROCEDURE — 90471 IMMUNIZATION ADMIN: CPT | Mod: S$GLB,,, | Performed by: INTERNAL MEDICINE

## 2024-06-14 PROCEDURE — 90750 HZV VACC RECOMBINANT IM: CPT | Mod: S$GLB,,, | Performed by: INTERNAL MEDICINE

## 2024-06-14 PROCEDURE — 80053 COMPREHEN METABOLIC PANEL: CPT | Performed by: INTERNAL MEDICINE

## 2024-06-14 PROCEDURE — 99215 OFFICE O/P EST HI 40 MIN: CPT | Mod: 25,S$GLB,, | Performed by: INTERNAL MEDICINE

## 2024-06-14 PROCEDURE — 86140 C-REACTIVE PROTEIN: CPT | Performed by: INTERNAL MEDICINE

## 2024-06-14 PROCEDURE — 36415 COLL VENOUS BLD VENIPUNCTURE: CPT | Performed by: INTERNAL MEDICINE

## 2024-06-14 PROCEDURE — 90677 PCV20 VACCINE IM: CPT | Mod: S$GLB,,, | Performed by: INTERNAL MEDICINE

## 2024-06-14 PROCEDURE — 80061 LIPID PANEL: CPT | Performed by: INTERNAL MEDICINE

## 2024-06-14 RX ORDER — UPADACITINIB 45 MG/1
45 TABLET, EXTENDED RELEASE ORAL DAILY
Qty: 28 TABLET | Refills: 2 | Status: ACTIVE | OUTPATIENT
Start: 2024-06-14

## 2024-06-14 NOTE — PROGRESS NOTES
"IBD PATIENT INTAKE:    Confirm current PCP: Unable, To Obtain  If no PCP-  number given to establish [phone]: Yes    IBD THERAPY (name, dose/frequency):  Cancelled Adalimumab (Humira, Hyrimoz, etc.)  *Patient states "body rejected Humira"  Last dose:  5/29/24    Not currently taking any IBD meds      Antibiotics (past 30 Days):  No  If yes   Indication:  Name of antibiotic:  Completion date:     Lower abdomen pain (numbing pain)    "

## 2024-06-14 NOTE — PATIENT INSTRUCTIONS
- start rinvoq  - labs today  - drop off stool sample   - will check in with you weekly  - shingles and PCV20 today     Rinvoq (Upadacitinib)    Class: JACQUELYN inhibitor    Mechanism of Action: small molecule that blocks the JACQUELYN pathways from inside the cells, which are believed to play a role in the activation of cytokines in the inflammatory process. Blocking the pathway decreases the production of cytokines and therefore reduces the inflammation in Ulcerative Colitis and Crohn's Disease.    Other indications: Rheumatoid Arthritis, Psoriatic Arthritis, Ankylosing Spondylitis, Non-radiographic Axial Spondyloarthritis, Atopic Dermatitis.      Dosage/ Route/ Frequency: oral tablet  Ulcerative colitis: 45 mg by mouth once a day with or without food for 8 weeks then your provider will decide if dose adjustments to 15 mg or 30 mg once a day dosing is needed.   Crohn's disease: 45 mg by mouth once a day with or without food for 12 weeks then your provider will decide if dose adjustments to 15 mg or 30 mg once a day dosing is needed.     Side effects  Please notify us if you are treated with antibiotics or experience signs of infection (ie. fevers) given we may need to hold your medication during this time.    Common side effects: (?5% or 1 in 20 people): upper respiratory infection (runny nose/ sore throat), elevated liver enzyme, rash, acne.    Please call us immediately if you develop any of the below problems:  Serious side effects:     Infections: tuberculosis, fungal infections, bacterial and viral infections such as herpes zoster (shingles). These are rare, but possible. Most patients that developed these kinds of infections were also taking other drugs that suppress the immune system.    Hypersensitivity reaction: this is a rare reaction that can improve if the medication is stopped. Some symptoms include swelling of your face and rash.    Malignancy including lymphoma and skin cancers are low risk though  possible    Heart related events (including blood clots, heart attack or stroke) Even though very rare these events have been reported in patients taking JACQUELYN inhibitors; rheumatoid arthritis patients who were 50 years of age and older with at least one cardiovascular risk factor treated with a JACQUELYN inhibitor are at higher risk. Seek emergency help if you are experiencing severe chest pain/tightness, shortness of breath, weakness in one side of your body, dropping on one side, slurred speech or swelling of a leg or arm.     Tears in the gut: extremely rare side effect. Higher risk when using NSAIDs or steroids. If you ever experience fever and constant stomach pain let your doctor know.    Fetal risk: women in childbearing years should use contraception while on this medication as it has not been studied in pregnancy.    Lab abnormalities: can cause abnormalities in blood cell count, liver enzyme count or cholesterol levels.     Labs/Monitoring:  Prior to starting this new agent, we will be checking labs to determine the safety of taking this medication including baseline blood counts, liver and kidney function tests, fasting cholesterol, TB test, viral hepatitis.  Once started on the therapy we will repeat blood count, liver and kidney test, and fasting cholesterol tests 2 months later, then blood count, liver and kidney test every 3 months.   TB test and viral hepatitis tests will be repeated every year. If you have any risk of exposure or travel to high-risk areas, please notify us so we can do this testing sooner.     Vaccine counseling:   Avoid live vaccines which include:  Intranasal Influenza LAIV4 (FluMist Quadrivalent)  Measles, Mumps, Rubella (MMR)  Rotavirus (RotaTeq, Rotarix)  Typhoid oral capsule (Vivotif)  Varicella (Varivax)  Smallpox (Vaccinia)  Yellow Fever (YF-Vax)  Adenovirus  Cholera (Vaxchora)    Avoid consumption of raw seafood such as oysters/sushi.

## 2024-06-14 NOTE — PROGRESS NOTES
"     [facility]             Inflammatory Bowel Disease   Follow-up  Note              TODAY'S VISIT DATE:  6/14/2024    Chief Complaint:   Chief Complaint   Patient presents with    Crohn's Disease     PCP: Unable, To Obtain    Previous History:  Dharmesh Lozoya is a 49 y.o. male with Crohn's disease (pancolonic, perianal fistula), history of C diff (neg 12/2022, 5/2023).  Patient was doing well until 2015 and developed blood in his stools and saw family medicine doctor in [place] and had a flex sig and diagnosed "colitis."  He then had a colonoscopy by a GI doctor in [place] and recalls being told distal colitis and started on medication that was too expensive and took for 30 days (sounds like apriso) but too expensive and not effective so discontinued. At that time he did not have insurance so he proceeded with dietary changes (more fish, turmeric).  Within a few weeks symptoms resolved and back to baseline normal BMs 4-5 variable consistency but no blood and able to go back to normal lifestyle. In 2017 he moved from [place] to [place] and in 12/2022 began with worsening bloody diarrhea, cramping and weight loss. He was diagnosed with C diff and vancomycin started which helped significant with his symptoms. His symptoms went from 10-15 BMs/d to 8-12 BMs/d after oral vancomycin.   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  He was then placed on lialda 3.6 g/d with entocort 9 mg/d (decreased to 6 mg/d 3/2023) which he took for 1 month with some response and then discontinued due to ineffectiveness. He was hospitalized 3/2023 at [facility] for 1 days due to significant bloody diarrhea with weakness and dehydration and he had " ran out of entocort a week before and was off of lialda by that time. He was discharged home on prednisone 40 mg daily for 2 weeks and advised to f/u with his GI doctor. In 3/2023 he noticed abscess which spontaneously drained and also at that time had issues with hemorrhoids He then started stelara 4/12/23. In 5/2023 pt had good response in regards to bleeding and stool frequency and at that time C diff recurrent and treated with vancomycin x 10 days.  Approximately about 6 weeks after starting IV stelara noticed initially small pruritic blisters between toes and then progressed to head, arms, legs, feet, toes. These lesions were treated symptomatically with hydroxyzine and he took athlete's foot powder and then eventually got topical cream for horses that helped (antifungal/antibacterial). Diet he found if dairy, spicy foods, red sauce exacerbated symptoms.  Colonoscopy 12/29/23 significant for inflammation with congestion, erosions, friability and mucus in a contiuous and circumferential pattern from anus to sigmoid c/w moderate inflammation (hernandez score 3)-bx c/w moderate active inflammation, CMV neg. Perianal exam at time of scope c/w palpable lump c/w fistula with drainage. He discontinued stelara 12/6/23 due to rash which resolved after discontinutaiton.  In 12/2023 pt started on oral prednisone 40 mg daily for 3 weeks and then decreased to 30 mg/d around 1/20/24 and continued on this when he was initially seen in IBD clinic 2/6/24 at which time he was having 4-5 soft to formed BMs/d. Since first week of Jan 2024 his symptoms started improving. Patient was started on humira 40 mg every 2 weeks on 3/20/2024 and he continued prednisone taper. MRI pelvis on 3/11/24 was significant for left intersphincteric perianal fistula and rectal inflammation. In late 3/2024 he was started on canasa suppositories and we continued the prednisone taper.     Interval History:  - current IBD meds: none  - recent meds: prednisone  "(2023 - 24) - symptoms started worsening when he dropped to 15mg; canasa suppositories not able to hold in - has not used in >1 month  - 12 liquid BM/day; 4 - 5 nocturnal BM; 50% of BM with blood and blood clots; 100% of BM with mucus; worsened urgency (3 accidents in the last 2 weeks)  - lower abdominal pain;  5-8/10 pain   - rectal pain due to multiple trips to the bathroom in the last 2 weeks   - 3/22 - CRP normal; WBC remained elevated; Hgb stable; LFTs normalized  -  - ADM trough <0.6 with Ab 432 - advised to stop humira ; pt tapered off prednisone on the same day  -  - dropped off stool sample at lab [facility] but never resulted  - fistula not active but still open  - NSAID use: No  - Narcotic use: No  - Alternative/complementary meds for IBD:   No    Prior Pertinent Surgeries:   None    Last pertinent Endoscopy/Imagin23 colonoscopy:  inflammation with congestion, erosions, friability ad mucus in a contiuous and circumferential pattern from anus to sigmoid c/w moderate inflammation (hernandez score 3). Scope not complete due to degree of inflammation. Biopsies showed rectum and sigmoid with moderately active inflammation with ulceration, CMV negative. On colonoscopy there was fistula with palpable lump "peanut" size with drainage.  3/11/24 MRI pelvis: Left intersphincteric perianal fistula at the left lateral approximate 4 o'clock position, measuring approximately 3.8 cm in length.  One suspected point of exit about the left gluteal fold.  Additional suspected blind-ending component about the left gluteal soft tissues.  Suspected granulation tissue about the visualized perianal fistula.  Diffuse rectal mucosal hyperenhancement and wall thickening, as well as inflammatory change about the perirectal fat and multiple prominent perirectal lymph nodes.Prostatomegaly.  Possible right-sided varicocele.      Therapeutic Drug Monitoring Labs:  24 ADM trough level <0.6 with Ab 432 (on humira 40mg " Q2W)    Prior IBD Therapies:  Oral mesalamine (unclear which one but seems like apriso)- ineffective but only took for 4 weeks  lialda 3.6 g/d   Entocort 9 mg/d- ineffective  Prednisone - effective (losses response in doses 15 mg or less)  Methotrexate - d/c due to elevated ALT  Stelara 90mg SC every 8 weeks (4/12/23 - 12-6/23) - d/c due to rash which resolved after stopping the medication  Humira (3/20/24 - 5/29/24) - d/c due to undetectable levels and high antibodies     Vaccinations:  Lab Results   Component Value Date    HEPBSURFABQU Negative 02/06/2024    HEPBSURFABQU <3 02/06/2024     Lab Results   Component Value Date    HEPAIGG Non-reactive 02/06/2024     Lab Results   Component Value Date    VARICELLAZOS 1030.00 02/06/2024    VARICELLAINT Positive 02/06/2024     Lab Results   Component Value Date    MUMPSIGGSCRE <5.00 02/06/2024    MUMPSIGGINTE Negative 02/06/2024      Lab Results   Component Value Date    RUBEOLAIGGAN 64.20 02/06/2024    RUBEOLAINTER Positive 02/06/2024     Flu shot: recommended yearly   COVID vaccine/booster:  per CDC recommendations  RSV: after age 51 yo if high risk  Tetanus (Tdap):  last tetanus was over 10 years ago. Discuss and recommend   PPSV 20: discussed and recommended. Administered today   HPV: NA     Meningococcal: NA  Hepatitis B: discussed and recommended. will give at future visit  Hepatitis A: discussed and recommended. will give at future visit  MMR (live vaccine): not immune to mumps, immune to rubella      Shingrix: discussed and recommended. Dose #1 administered today in clinic. 2nd dose in 2-6 months.      Review of Systems   Constitutional:  Negative for chills, fever and weight loss.   HENT:          No oral ulcers, dysphagia, oral thrush   Eyes:  Negative for blurred vision, pain and redness.   Respiratory:  Negative for cough and shortness of breath.    Cardiovascular:  Negative for chest pain.   Gastrointestinal:  Negative for abdominal pain, heartburn, nausea  "and vomiting.   Genitourinary:  Negative for dysuria and hematuria.   Musculoskeletal:  Negative for back pain and joint pain.   Skin:  Negative for rash.   Psychiatric/Behavioral:  Negative for depression. The patient is not nervous/anxious and does not have insomnia.      All Medical History/Surgical History/Family History/Social History/Allergies have been reviewed and updated in EMR    No outpatient medications have been marked as taking for the 6/14/24 encounter (Office Visit) with Tianna Bhat MD.     Vital Signs:  /71 (BP Location: Left arm, Patient Position: Sitting, BP Method: Medium (Automatic))   Pulse 78   Temp 98.6 °F (37 °C) (Temporal)   Ht 6' 2" (1.88 m)   Wt 100.2 kg (220 lb 14.4 oz)   SpO2 98%   BMI 28.36 kg/m²    Physical Exam  Abdominal:      General: Bowel sounds are normal. There is no distension.      Palpations: Abdomen is soft.      Tenderness: There is no abdominal tenderness.     Labs:   Lab Results   Component Value Date    CRP 24.6 (H) 06/14/2024     Lab Results   Component Value Date    HEPBSAG Non-reactive 02/06/2024    HEPBCAB Non-reactive 02/06/2024     Lab Results   Component Value Date    TBGOLDPLUS Negative 02/06/2024     Lab Results   Component Value Date    VDFLKJRF13OU 23 (L) 02/06/2024    DJDGJTBV77 348 02/06/2024     Lab Results   Component Value Date    WBC 9.50 06/14/2024    WBC 9.50 06/14/2024    HGB 12.4 (L) 06/14/2024    HGB 12.4 (L) 06/14/2024    HCT 40.6 06/14/2024    HCT 40.6 06/14/2024    MCV 79 (L) 06/14/2024    MCV 79 (L) 06/14/2024     06/14/2024     06/14/2024     Lab Results   Component Value Date    CREATININE 1.2 06/14/2024    CREATININE 1.2 06/14/2024    ALBUMIN 3.4 (L) 06/14/2024    ALBUMIN 3.4 (L) 06/14/2024    BILITOT 0.7 06/14/2024    BILITOT 0.7 06/14/2024    ALKPHOS 66 06/14/2024    ALKPHOS 66 06/14/2024    AST 19 06/14/2024    AST 19 06/14/2024    ALT 15 06/14/2024    ALT 15 06/14/2024     Assessment/Plan:  Dharmesh Lozoya is a " 49 y.o. male with Crohn's disease (pan-colonic, perianal fistula), recurrent C diff (12/2022, 5/2023), elevated LFTs (ALT).     Patient symptoms have significantly worsened since he has not been on any medications for the last two weeks. He completed the prednisone taper on 5/29. His humira 10 week drug levels were undetectable with high antibodies therefore he was advised to d/c humira and his LD was on 5/29. Today we discussed in depth different treatment options. Given his history of fistulizing disease he would benefits most from another TNF blocker or a JACQUELYN inhibitor. Through shared decision making we decided to start patient on rinvoq vs. Infliximab with an immunomodulator due to increased risk of immunogenicity in the setting of antibody formation to humira, pt preference of oral tablet vs infusion, longer onset of action of IFX vs rinvoq likely requiring another prednisone course in the meantime. To avoid starting multiple agents, additional immunosuppression, and prednisone, patient advised to start rinvoq. Basic labs and stool studies to be repeated today to rule out infection, even though patient reports his symptoms feel like a flare rather than c diff, which he has experienced in the past. PharmD will check in with pt weekly until symptoms are under better control.     # Crohn's disease (pan-colonic, perianal fistula):    - start rinvoq 45mg PO daily x12 weeks then 30mg daily for maintenance dose  - educated pt extensively on MOA, route and frequency of administration and safety profile of rinvoq. Pt denies any history of CVD, MI, or clots.  - pt to let us know if any worsening diarrhea at anytime  - stool cultures and calprotectin today  - basic labs today: CBC, CMP, CRP, lipid panel, CMV DNA PCR to r/o infection   - drug monitoring labs: CBC/CMP q 6 mos (9/2023) TPMT (normal 2/2024), TB quantiferon (2/2025), Hep B testing (2/2025)    # Prostate- MRI pelvis showed prostatomegaly and possible right  sided varicocele  - will consider urology referral at later date     # Immunodeficiency due to long term immunosuppressive drug therapy and IBD specific health maintenance:  CRC risk- sx 2015, pancolonic, surveillance colonoscopy q 1-2 years  Skin exam- yearly  Risk for osteopenia/osteoporosis- on prednisone, will recommend calcium/vit D  Vitamin D deficiency- currently not on supplement; will recommend vit D3 5000- 2 pills daily for 3 mos and then 1 pill daily  Vaccines- no live vaccines, PCV20 and shingles vaccine #1 today, 2nd dose in 2-6 mo    I personally examined the patient and discussed above plan in collaboration with Kristen LeaD.      Visit today is associated with current or anticipated ongoing medical care related to this patient's single serious condition/complex condition Crohn's disease.    Follow up in about 4 weeks (around 7/12/2024). PharmD weekly phone check in.     Tianna Bhat MD  Department of Gastroenterology  Medical Director, Inflammatory Bowel Disease

## 2024-06-17 LAB
CMV DNA SPEC QL NAA+PROBE: NORMAL
CYTOMEGALOVIRUS PCR, QUANT: NOT DETECTED IU/ML

## 2024-06-21 ENCOUNTER — PATIENT MESSAGE (OUTPATIENT)
Dept: GASTROENTEROLOGY | Facility: CLINIC | Age: 49
End: 2024-06-21
Payer: COMMERCIAL

## 2024-06-21 DIAGNOSIS — K50.10 CROHN'S DISEASE OF COLON WITHOUT COMPLICATION: Primary | ICD-10-CM

## 2024-06-21 RX ORDER — PREDNISONE 10 MG/1
40 TABLET ORAL SEE ADMIN INSTRUCTIONS
Qty: 100 TABLET | Refills: 0 | Status: SHIPPED | OUTPATIENT
Start: 2024-06-21

## 2024-06-21 NOTE — TELEPHONE ENCOUNTER
"Spoke with Dharmesh:  IBD meds:  - Rinvoq 45mg/d (started 6/14/24)    1 wk update, pt initiated    - 10-12 BM/d, loose  - + blood, occ, trace in stool and on TP, <50% of the time  - + mucous but less than a week ago  - 3-4 noc BM/d, not sleeping and definitely feeling the affects of this  - still having urgency but he's no longer straining (rectal dry heaves)  - constant LLQ pain, 6/10, worsens during BM, comes back down to 6 after BM  - states, "I can tell its doing something." Verified improvement in bleeding, mucous, and tenesmus sxs    Dr. Bhat will be updated.  "

## 2024-07-01 DIAGNOSIS — K60.3 PERIANAL FISTULA: ICD-10-CM

## 2024-07-01 DIAGNOSIS — K50.10 CROHN'S DISEASE OF COLON WITHOUT COMPLICATION: Primary | ICD-10-CM

## 2024-07-01 RX ORDER — CIPROFLOXACIN 500 MG/1
500 TABLET ORAL 2 TIMES DAILY
Qty: 28 TABLET | Refills: 0 | Status: SHIPPED | OUTPATIENT
Start: 2024-07-01

## 2024-07-01 RX ORDER — METRONIDAZOLE 500 MG/1
500 TABLET ORAL 3 TIMES DAILY
Qty: 42 TABLET | Refills: 0 | Status: SHIPPED | OUTPATIENT
Start: 2024-07-01

## 2024-07-03 ENCOUNTER — TELEPHONE (OUTPATIENT)
Dept: GASTROENTEROLOGY | Facility: CLINIC | Age: 49
End: 2024-07-03
Payer: COMMERCIAL

## 2024-07-03 NOTE — TELEPHONE ENCOUNTER
Symptoms:  Significant improvement  - yesterday 10 BMs and today only 5 so far, soft to loose, no blood in past 2 days in stool but once on TP, decreased urgency, no nocturnal BMs for first time last night  - no rectal pain and no pus in past 2 days and decreased size of possible fisutula/abscess, pt not concerned    IBD meds:  - Rinvoq 45 mg/d (started 6/14/24)   - Prednisone 40 mg/d (started 40mg/d 6/21, 40mg/d 6/27, 30 mg/d 7/3) - pt decreased pred to 30 mg/d today on his own  - Ciprofloxacin/Flagyl started 7/2/24     Plan:  - continue rinvoq 45 mg daily with plan for 3 mos induction followed by 30 mg daily for maintenance  - continue prednisone 30 mg/d and will check on pt on Tuesday, 7/9 to see if we can further taper   - complete course of cipro/flagyl with plans to finish 7/17

## 2024-07-09 ENCOUNTER — PATIENT MESSAGE (OUTPATIENT)
Dept: GASTROENTEROLOGY | Facility: CLINIC | Age: 49
End: 2024-07-09
Payer: COMMERCIAL

## 2024-07-16 ENCOUNTER — TELEPHONE (OUTPATIENT)
Dept: GASTROENTEROLOGY | Facility: CLINIC | Age: 49
End: 2024-07-16
Payer: COMMERCIAL

## 2024-07-16 NOTE — TELEPHONE ENCOUNTER
----- Message from Ryan Khan sent at 7/16/2024  8:28 AM CDT -----  Name of Who is Calling:NICOLE ABREU [[id number]]        What is the request in detail:Pt would like a callback from the office to luann 7/19 to the last of week August (pt prefers 8/30 if possible) per Dr Bhat.Please advise thank you       Can the clinic reply by [facility]:NO         What Number to Call Back if not in [facility]:.Telephone Information:  Mobile          [phone]

## 2024-07-24 DIAGNOSIS — K50.10 CROHN'S DISEASE OF COLON WITHOUT COMPLICATION: ICD-10-CM

## 2024-07-24 RX ORDER — PREDNISONE 10 MG/1
10 TABLET ORAL SEE ADMIN INSTRUCTIONS
Qty: 10 TABLET | Refills: 0 | Status: SHIPPED | OUTPATIENT
Start: 2024-07-24

## 2024-08-09 DIAGNOSIS — K50.10 CROHN'S DISEASE OF COLON WITHOUT COMPLICATION: ICD-10-CM

## 2024-08-09 DIAGNOSIS — K50.10 CROHN'S DISEASE OF COLON WITHOUT COMPLICATION: Primary | ICD-10-CM

## 2024-08-09 NOTE — TELEPHONE ENCOUNTER
Allergies reviewed, Rx pended for approval    OV: 9/3/24    Pt only has 1 10mg tab of Prednisone on hand. Starting 20mg/d today.

## 2024-08-11 RX ORDER — PREDNISONE 10 MG/1
20 TABLET ORAL DAILY
Qty: 25 TABLET | Refills: 0 | Status: SHIPPED | OUTPATIENT
Start: 2024-08-11

## 2024-08-16 ENCOUNTER — PATIENT MESSAGE (OUTPATIENT)
Dept: GASTROENTEROLOGY | Facility: CLINIC | Age: 49
End: 2024-08-16
Payer: COMMERCIAL

## 2024-08-16 ENCOUNTER — TELEPHONE (OUTPATIENT)
Dept: GASTROENTEROLOGY | Facility: CLINIC | Age: 49
End: 2024-08-16
Payer: COMMERCIAL

## 2024-08-16 NOTE — TELEPHONE ENCOUNTER
----- Message from Jolynn Chicas RN sent at 8/9/2024  4:23 PM CDT -----  IBD meds:  - Rinvoq 45mg/d (started 6/14/24)    Prednisone 20mg/d (started 8/9)    OV: 9/3/24

## 2024-08-16 NOTE — TELEPHONE ENCOUNTER
"Spoke with Dharmesh:  IBD meds:  - Rinvoq 45mg/d (started 6/14/24)     Prednisone 20mg/d (started 8/9)    - 10+ BM/d, liq to formed, turned in stool samples today to [facility]   - +blood in am with the 1st couple of BMs (passes thumbnail sized clots), resolves by afternoon, mixed in stool  - 4 noc BM in last 7 days  - intermittent LLQ and anal pain, 5/10, "feels like how your leg feels when it goes to sleep," worse before/during/post BM, then goes away til next BM  - denies mucous and false BR trips  - states, "I don't think the Prednisone is doing anything for me."  - OV: 9/3/24    Dr. Bhat will be updated.  "

## 2024-08-16 NOTE — TELEPHONE ENCOUNTER
Dharmesh returned call and agreed to appt on Friday, Aug 23rd at 08:30am.    Dr. Bhat will be updated.

## 2024-08-21 ENCOUNTER — TELEPHONE (OUTPATIENT)
Dept: GASTROENTEROLOGY | Facility: CLINIC | Age: 49
End: 2024-08-21
Payer: COMMERCIAL

## 2024-08-21 DIAGNOSIS — K50.10 CROHN'S DISEASE OF COLON WITHOUT COMPLICATION: ICD-10-CM

## 2024-08-21 RX ORDER — PREDNISONE 10 MG/1
40 TABLET ORAL DAILY
Qty: 70 TABLET | Refills: 0 | Status: SHIPPED | OUTPATIENT
Start: 2024-08-21

## 2024-08-21 NOTE — TELEPHONE ENCOUNTER
----- Message from Tianna Bhat MD sent at 8/21/2024 11:44 AM CDT -----  Let patient know that the stool calprotectin is elevated and rest of stool studies don't show any infection  Increase pred to 40 mg daily now and I will see him as scheduled in clinic on this Friday.     SS

## 2024-08-21 NOTE — TELEPHONE ENCOUNTER
Spoke with Dharmesh:  - reviewed elevated stool calprotectin and Dr. Bhat's recommendation to increase Prednisone to 40mg/d  - stool neg for infection  - new Rx sent for approval  - he states understanding and agrees with this plan

## 2024-08-23 ENCOUNTER — OFFICE VISIT (OUTPATIENT)
Dept: GASTROENTEROLOGY | Facility: CLINIC | Age: 49
End: 2024-08-23
Payer: COMMERCIAL

## 2024-08-23 ENCOUNTER — PATIENT MESSAGE (OUTPATIENT)
Dept: GASTROENTEROLOGY | Facility: CLINIC | Age: 49
End: 2024-08-23
Payer: COMMERCIAL

## 2024-08-23 ENCOUNTER — LAB VISIT (OUTPATIENT)
Dept: LAB | Facility: HOSPITAL | Age: 49
End: 2024-08-23
Payer: COMMERCIAL

## 2024-08-23 VITALS
DIASTOLIC BLOOD PRESSURE: 86 MMHG | TEMPERATURE: 98 F | WEIGHT: 220.44 LBS | OXYGEN SATURATION: 100 % | HEART RATE: 64 BPM | HEIGHT: 74 IN | SYSTOLIC BLOOD PRESSURE: 123 MMHG | BODY MASS INDEX: 28.29 KG/M2

## 2024-08-23 DIAGNOSIS — K50.119 CROHN'S DISEASE OF LARGE INTESTINE WITH COMPLICATION: ICD-10-CM

## 2024-08-23 DIAGNOSIS — K50.119 CROHN'S DISEASE OF LARGE INTESTINE WITH COMPLICATION: Primary | ICD-10-CM

## 2024-08-23 LAB
ALBUMIN SERPL BCP-MCNC: 3.5 G/DL (ref 3.5–5.2)
ALP SERPL-CCNC: 54 U/L (ref 55–135)
ALT SERPL W/O P-5'-P-CCNC: 26 U/L (ref 10–44)
ANION GAP SERPL CALC-SCNC: 7 MMOL/L (ref 8–16)
AST SERPL-CCNC: 19 U/L (ref 10–40)
BASOPHILS # BLD AUTO: 0.02 K/UL (ref 0–0.2)
BASOPHILS NFR BLD: 0.2 % (ref 0–1.9)
BILIRUB SERPL-MCNC: 0.3 MG/DL (ref 0.1–1)
BUN SERPL-MCNC: 15 MG/DL (ref 6–20)
CALCIUM SERPL-MCNC: 9 MG/DL (ref 8.7–10.5)
CHLORIDE SERPL-SCNC: 109 MMOL/L (ref 95–110)
CHOLEST SERPL-MCNC: 201 MG/DL (ref 120–199)
CHOLEST/HDLC SERPL: 4.8 {RATIO} (ref 2–5)
CO2 SERPL-SCNC: 26 MMOL/L (ref 23–29)
CREAT SERPL-MCNC: 1 MG/DL (ref 0.5–1.4)
CRP SERPL-MCNC: 2.2 MG/L (ref 0–8.2)
DIFFERENTIAL METHOD BLD: ABNORMAL
EOSINOPHIL # BLD AUTO: 0 K/UL (ref 0–0.5)
EOSINOPHIL NFR BLD: 0 % (ref 0–8)
ERYTHROCYTE [DISTWIDTH] IN BLOOD BY AUTOMATED COUNT: 18 % (ref 11.5–14.5)
EST. GFR  (NO RACE VARIABLE): >60 ML/MIN/1.73 M^2
GLUCOSE SERPL-MCNC: 130 MG/DL (ref 70–110)
HCT VFR BLD AUTO: 39.2 % (ref 40–54)
HDLC SERPL-MCNC: 42 MG/DL (ref 40–75)
HDLC SERPL: 20.9 % (ref 20–50)
HGB BLD-MCNC: 11.6 G/DL (ref 14–18)
IMM GRANULOCYTES # BLD AUTO: 0.11 K/UL (ref 0–0.04)
IMM GRANULOCYTES NFR BLD AUTO: 1 % (ref 0–0.5)
LDLC SERPL CALC-MCNC: 141.8 MG/DL (ref 63–159)
LYMPHOCYTES # BLD AUTO: 0.9 K/UL (ref 1–4.8)
LYMPHOCYTES NFR BLD: 7.9 % (ref 18–48)
MCH RBC QN AUTO: 24.4 PG (ref 27–31)
MCHC RBC AUTO-ENTMCNC: 29.6 G/DL (ref 32–36)
MCV RBC AUTO: 82 FL (ref 82–98)
MONOCYTES # BLD AUTO: 0.3 K/UL (ref 0.3–1)
MONOCYTES NFR BLD: 3.1 % (ref 4–15)
NEUTROPHILS # BLD AUTO: 9.7 K/UL (ref 1.8–7.7)
NEUTROPHILS NFR BLD: 87.8 % (ref 38–73)
NONHDLC SERPL-MCNC: 159 MG/DL
NRBC BLD-RTO: 0 /100 WBC
PLATELET # BLD AUTO: 586 K/UL (ref 150–450)
PMV BLD AUTO: 10.2 FL (ref 9.2–12.9)
POTASSIUM SERPL-SCNC: 4.3 MMOL/L (ref 3.5–5.1)
PROT SERPL-MCNC: 6.8 G/DL (ref 6–8.4)
RBC # BLD AUTO: 4.76 M/UL (ref 4.6–6.2)
SODIUM SERPL-SCNC: 142 MMOL/L (ref 136–145)
TRIGL SERPL-MCNC: 86 MG/DL (ref 30–150)
WBC # BLD AUTO: 11.08 K/UL (ref 3.9–12.7)

## 2024-08-23 PROCEDURE — 80053 COMPREHEN METABOLIC PANEL: CPT | Performed by: STUDENT IN AN ORGANIZED HEALTH CARE EDUCATION/TRAINING PROGRAM

## 2024-08-23 PROCEDURE — 80061 LIPID PANEL: CPT | Performed by: STUDENT IN AN ORGANIZED HEALTH CARE EDUCATION/TRAINING PROGRAM

## 2024-08-23 PROCEDURE — 86140 C-REACTIVE PROTEIN: CPT | Performed by: STUDENT IN AN ORGANIZED HEALTH CARE EDUCATION/TRAINING PROGRAM

## 2024-08-23 PROCEDURE — 85025 COMPLETE CBC W/AUTO DIFF WBC: CPT | Performed by: STUDENT IN AN ORGANIZED HEALTH CARE EDUCATION/TRAINING PROGRAM

## 2024-08-23 PROCEDURE — 36415 COLL VENOUS BLD VENIPUNCTURE: CPT | Performed by: STUDENT IN AN ORGANIZED HEALTH CARE EDUCATION/TRAINING PROGRAM

## 2024-08-23 RX ORDER — CIPROFLOXACIN 500 MG/1
500 TABLET ORAL EVERY 12 HOURS
Qty: 28 TABLET | Refills: 0 | Status: SHIPPED | OUTPATIENT
Start: 2024-08-23 | End: 2024-09-06

## 2024-08-23 RX ORDER — METRONIDAZOLE 500 MG/1
500 TABLET ORAL EVERY 8 HOURS
Qty: 42 TABLET | Refills: 0 | Status: SHIPPED | OUTPATIENT
Start: 2024-08-23 | End: 2024-09-06

## 2024-08-23 NOTE — PROGRESS NOTES
"     [facility]             Inflammatory Bowel Disease   Follow-up  Note              TODAY'S VISIT DATE:  8/23/2024    Chief Complaint:   Chief Complaint   Patient presents with    Crohn's Disease     PCP: Unable, To Obtain, declined referral today    Previous History:  Dharmesh Lozoya is a 49 y.o. male with Crohn's disease (pancolonic, perianal fistula), history of C diff (neg 12/2022, 5/2023).  Patient was doing well until 2015 and developed blood in his stools and saw family medicine doctor in [place] and had a flex sig and diagnosed "colitis."  He then had a colonoscopy by a GI doctor in [place] and recalls being told distal colitis and started on medication that was too expensive and took for 30 days (sounds like apriso) but too expensive and not effective so discontinued. At that time he did not have insurance so he proceeded with dietary changes (more fish, turmeric).  Within a few weeks symptoms resolved and back to baseline normal BMs 4-5 variable consistency but no blood and able to go back to normal lifestyle. In 2017 he moved from [place] to [place] and in 12/2022 began with worsening bloody diarrhea, cramping and weight loss. He was diagnosed with C diff and vancomycin started which helped significant with his symptoms. His symptoms went from 10-15 BMs/d to 8-12 BMs/d after oral vancomycin.   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  He was then placed on lialda 3.6 g/d with entocort 9 mg/d (decreased to 6 mg/d 3/2023) which he took for 1 month with some response and then discontinued due to ineffectiveness. He was hospitalized 3/2023 at [facility] for 1 days due to significant bloody diarrhea with weakness and " dehydration and he had ran out of entocort a week before and was off of lialda by that time. He was discharged home on prednisone 40 mg daily for 2 weeks and advised to f/u with his GI doctor. In 3/2023 he noticed abscess which spontaneously drained and also at that time had issues with hemorrhoids He then started stelara 4/12/23. In 5/2023 pt had good response in regards to bleeding and stool frequency and at that time C diff recurrent and treated with vancomycin x 10 days.  Approximately about 6 weeks after starting IV stelara noticed initially small pruritic blisters between toes and then progressed to head, arms, legs, feet, toes. These lesions were treated symptomatically with hydroxyzine and he took athlete's foot powder and then eventually got topical cream for horses that helped (antifungal/antibacterial). Diet he found if dairy, spicy foods, red sauce exacerbated symptoms.  Colonoscopy 12/29/23 significant for inflammation with congestion, erosions, friability and mucus in a contiuous and circumferential pattern from anus to sigmoid c/w moderate inflammation (hernandez score 3)-bx c/w moderate active inflammation, CMV neg. Perianal exam at time of scope c/w palpable lump c/w fistula with drainage. He discontinued stelara 12/6/23 due to rash which resolved after discontinutaiton.  In 12/2023 pt started on oral prednisone 40 mg daily for 3 weeks and then decreased to 30 mg/d around 1/20/24 and continued on this when he was initially seen in IBD clinic 2/6/24 at which time he was having 4-5 soft to formed BMs/d. Since first week of Jan 2024 his symptoms started improving. Patient was started on humira 40 mg every 2 weeks on 3/20/2024 and he continued prednisone taper. MRI pelvis on 3/11/24 was significant for left intersphincteric perianal fistula and rectal inflammation. In late 3/2024 he was started on canasa suppositories and we continued the prednisone taper. In 6/14/24, elected to start Rinvoq. Started  "prednisone taper after one week of Rinvoq as symptoms had not yet significantly improved. By 7/3, had significant improvement with only 2-3 formed stools per day without blood, pain or urgency. Tapered prednisone weekly, but within 3 days of stopping, symptoms worsened significantly and prednisone was restarted at 20 mg on  then increased to 40 mg on  for persistent symptoms - 10 BMs per day with scant blood.    Interval History:  - current IBD meds: Rinvoq 45 mg po daily (started 24, 30 mg/d due to start 24), prednisone 40 mg/d (24-24, 20 mg/d , 40 mg/d )  - 10 liquid BM/day; 1 nocturnal BM; 20-30% of BM with scant blood; urgency improved since starting Rinvoq  - since starting pred 40 mg yesterday, had no nocturnal stool last night, less BM this morning, no blood this morning  - denies abd pain  - describes significant anal pressure when having BM, denies pain. Does not feel like anal fissure which he had previously  - Had worsened fistula drainage last month that resolved with course of cipro/flagyl (-) Denies pain/drainage today  - Reports having a few fluid-filled blisters, mostly around eyes, since stopping Stelara. Two small lesions scabbed over today near his eyes.  - stool calprotectin: 24 1420,  24 1370  - Stool studies negative for infection   - NSAID use: No  - Narcotic use: No  - Alternative/complementary meds for IBD:   No    Prior Pertinent Surgeries:   None    Last pertinent Endoscopy/Imagin23 colonoscopy:  inflammation with congestion, erosions, friability ad mucus in a contiuous and circumferential pattern from anus to sigmoid c/w moderate inflammation (hernandez score 3). Scope not complete due to degree of inflammation. Biopsies showed rectum and sigmoid with moderately active inflammation with ulceration, CMV negative. On colonoscopy there was fistula with palpable lump "peanut" size with drainage.  3/11/24 MRI pelvis: Left " intersphincteric perianal fistula at the left lateral approximate 4 o'clock position, measuring approximately 3.8 cm in length.  One suspected point of exit about the left gluteal fold.  Additional suspected blind-ending component about the left gluteal soft tissues.  Suspected granulation tissue about the visualized perianal fistula.  Diffuse rectal mucosal hyperenhancement and wall thickening, as well as inflammatory change about the perirectal fat and multiple prominent perirectal lymph nodes.Prostatomegaly.  Possible right-sided varicocele.      Therapeutic Drug Monitoring Labs:  5/29/24 ADM trough level <0.6 with Ab 432 (on humira 40mg Q2W)    Prior IBD Therapies:  Oral mesalamine (unclear which one but seems like apriso)- ineffective but only took for 4 weeks  lialda 3.6 g/d   Entocort 9 mg/d- ineffective  Prednisone - effective (losses response in doses 15 mg or less)  Methotrexate - d/c due to elevated ALT  Stelara 90mg SC every 8 weeks (4/12/23 - 12-6/23) - d/c due to rash which resolved after stopping the medication  Humira (3/20/24 - 5/29/24) - d/c due to undetectable levels and high antibodies     Vaccinations:  Lab Results   Component Value Date    HEPBSURFABQU Negative 02/06/2024    HEPBSURFABQU <3 02/06/2024     Lab Results   Component Value Date    HEPAIGG Non-reactive 02/06/2024     Lab Results   Component Value Date    VARICELLAZOS 1030.00 02/06/2024    VARICELLAINT Positive 02/06/2024     Lab Results   Component Value Date    MUMPSIGGSCRE <5.00 02/06/2024    MUMPSIGGINTE Negative 02/06/2024      Lab Results   Component Value Date    RUBEOLAIGGAN 64.20 02/06/2024    RUBEOLAINTER Positive 02/06/2024     Immunization History   Administered Date(s) Administered    Pneumococcal Conjugate - 20 Valent 06/14/2024    Zoster Recombinant 06/14/2024   Flu shot: recommended yearly   COVID vaccine/booster:  per CDC recommendations  RSV: after age 49 yo if high risk  Tetanus (Tdap):  last tetanus was over 10  "years ago. Discuss and recommend   HPV: NA     Meningococcal: NA  Hepatitis B: defer to future visit   Hepatitis A: defer to future visit   MMR (live vaccine): not immune to mumps, immune to rubella      Shingrix: #2 due prior to 12/2024, defer until next visit       Review of Systems   Constitutional:  Negative for chills, fever and weight loss.   HENT:          No oral ulcers, dysphagia, oral thrush   Eyes:  Negative for blurred vision, pain and redness.   Respiratory:  Negative for cough and shortness of breath.    Cardiovascular:  Negative for chest pain.   Gastrointestinal:  Negative for abdominal pain, heartburn, nausea and vomiting.   Genitourinary:  Negative for dysuria and hematuria.   Musculoskeletal:  Negative for back pain and joint pain.   Skin:  Negative for rash.   Psychiatric/Behavioral:  Negative for depression. The patient is not nervous/anxious and does not have insomnia.      All Medical History/Surgical History/Family History/Social History/Allergies have been reviewed and updated in EMR    Outpatient Medications Marked as Taking for the 8/23/24 encounter (Office Visit) with Tianna Bhat MD   Medication Sig Dispense Refill    predniSONE (DELTASONE) 10 MG tablet Take 4 tablets (40 mg total) by mouth once daily. Taper per Dr. Bhat's instruction 70 tablet 0    upadacitinib (RINVOQ) 45 mg Tb24 Take 45 mg by mouth once daily. 28 tablet 2     Vital Signs:  /86 (BP Location: Left arm, Patient Position: Sitting)   Pulse 64   Temp 98.1 °F (36.7 °C)   Ht 6' 2" (1.88 m)   Wt 100 kg (220 lb 7.4 oz)   SpO2 100%   BMI 28.31 kg/m²    Physical Exam  Abdominal:      General: Bowel sounds are normal. There is no distension.      Palpations: Abdomen is soft.      Tenderness: There is no abdominal tenderness.   Genitourinary:     Comments: No visible perianal abscess or anal fissure. Purulent drainage noted in perianal area coming from fistula.    Labs:   Lab Results   Component Value Date    CRP " 2.2 08/23/2024    CALPROTECTIN 1,420.0 (H) 06/14/2024     Lab Results   Component Value Date    HEPBSAG Non-reactive 02/06/2024    HEPBCAB Non-reactive 02/06/2024     Lab Results   Component Value Date    TBGOLDPLUS Negative 02/06/2024     Lab Results   Component Value Date    QIAYXNNW77QS 23 (L) 02/06/2024    LSRCCMDD11 348 02/06/2024     Lab Results   Component Value Date    WBC 11.08 08/23/2024    HGB 11.6 (L) 08/23/2024    HCT 39.2 (L) 08/23/2024    MCV 82 08/23/2024     (H) 08/23/2024     Lab Results   Component Value Date    CREATININE 1.0 08/23/2024    ALBUMIN 3.5 08/23/2024    BILITOT 0.3 08/23/2024    ALKPHOS 54 (L) 08/23/2024    AST 19 08/23/2024    ALT 26 08/23/2024     Assessment/Plan:  Dharmesh Lozoya is a 49 y.o. male with Crohn's disease (pan-colonic, perianal fistula), recurrent C diff (12/2022, 5/2023), elevated LFTs (ALT).     He initially did exceptionally well since starting Rinvoq at last visit 6/2024 along with prednisone taper, 40 mg started 6/21, finished weekly taper on 8/6, but unfortunately symptoms worsened significantly after stopping prednisone and persisted despite restarting pred 20 on 8/9. Increased to prednisone 40 mg on 8/22 due to persistent symptoms and elevated calprotectin (overall unchanged since starting Rinvoq). Today we discussed in depth different treatment options, mostly continuing prolonged trial of Rinvoq 45 mg vs switch to Remicade + immunomodulator given his perianal disease. As he has yet to complete his initial 3 month induction dose of Rinvoq, recommended we continue Rinvoq for at least one month along with prednisone. We will also start another 14d course of cipro/flagyl for perianal purulence noted on exam, likely from fistula though no obvious abscess. We will follow up next month. If still failing Rinvoq, would likely repeat colonoscopy and discuss switch to Remicade + azathioprine.    # Crohn's disease (pan-colonic, perianal fistula):    - continue rinvoq  45mg PO daily for at least 4 more weeks and will start approval for 45 mg daily for more extended time given recurrent symptoms as he tapered prednisone    - continue prednisone 40 mg daily, RN check for taper after one week (8/29)  - blisters- inactive on eyes- possible EIM vs alternate autoimmune disease though unlikely related to rinvoq, encouraged to take pictures and send to us and would consider referral to dermatology based on this  - perianal purulence on exam, likely from fistula but no obvious abscess- restart cipro/flagyl for 14d course (ordered today)  - basic labs:  CRP today  - drug monitoring labs: CBC/CMP q 3 mos (today after 2 mos of rinvoq), lipid profile (today and then yearly),  TPMT (normal 2/2024), TB quantiferon (2/2025), Hep B testing (2/2025)    # Prostate- MRI pelvis showed prostatomegaly and possible right sided varicocele  - will consider urology referral at later date     # Immunodeficiency due to long term immunosuppressive drug therapy and IBD specific health maintenance:  CRC risk- sx 2015, pancolonic, surveillance colonoscopy q 1-2 years  Skin exam- yearly  Risk for osteopenia/osteoporosis- on prednisone, recommend calcium/vit D  Vitamin D deficiency- currently not on supplement; recommend vit D3 5000- 2 pills daily for 3 mos and then 1 pill daily  Vaccines- no live vaccines, PCV20 and shingles vaccine #1 6/14/24, 2nd dose in 2-6 mo. Discuss at next visit.    Total time: 40 min    Follow up in 18 days (on 9/10/2024). RN weekly phone check in.     Pawan Foster  Gastroenterology and Hepatology Fellow, PGY-VI    I have reviewed and concur with the fellow's history, physical, assessment, and plan.  I have personally interviewed and examined the patient. The above note has been edited to reflect my recommendations.     Tianna Bhat MD   Department of Gastroenterology  Medical Director, Inflammatory Bowel Disease

## 2024-08-23 NOTE — Clinical Note
Please check in weekly on patient due to prednisone. Increased to 40 mg yesterday 8/22. First check in on 8/28. Please CC Dr. Foster as well.

## 2024-08-23 NOTE — PATIENT INSTRUCTIONS
Take pictures of blisters if they recur  Continue Rinvoq 45 mg for at least one more month  Continue prednisone 40 mg with weekly check-ins by RN before tapering (next on 8/29)

## 2024-08-28 ENCOUNTER — TELEPHONE (OUTPATIENT)
Dept: GASTROENTEROLOGY | Facility: CLINIC | Age: 49
End: 2024-08-28
Payer: COMMERCIAL

## 2024-08-28 NOTE — TELEPHONE ENCOUNTER
----- Message from Jolynn Chicas RN sent at 8/23/2024 10:08 AM CDT -----  Rinvoq 45mg daily    Prednisone 40mg (started 20mg/d on 8/9, increased to 40mg/d 8/21)    CC: Dr. ABDI Foster on all notes

## 2024-08-29 NOTE — TELEPHONE ENCOUNTER
"Per PM:  IBD meds:  - Rinvoq 45mg daily  - Cipro 500mg twice daily (end 9/6)  - Metronidazole 500mg 3 times daily (end 9/6)     Prednisone 40mg (started 20mg/d on 8/9, increased to 40mg/d 8/21)    - 6-8 BM/d, chunky, brown  - + mucous, "very little"  - 2 noc BM in the last 6 days, attributes to eating late in the day  - + false BR trips, doesn't provide quantity, happens in AM, passes gas  - denies blood and pain  - overall feels much better than a week ago  - stopped using snuff and has switched to nicotine pouches  - OV 9/10/24    Dr. Bhat and Dr. ABDI Foster will be updated.   "

## 2024-09-05 ENCOUNTER — TELEPHONE (OUTPATIENT)
Dept: GASTROENTEROLOGY | Facility: CLINIC | Age: 49
End: 2024-09-05
Payer: COMMERCIAL

## 2024-09-05 NOTE — TELEPHONE ENCOUNTER
Disease: Crohn's Disease  Distribution: pan colonic w/ perianal fistula    IBD meds:  - Rinvoq 45 mg/ QD, Cipro/ Flagyl (due to complete in ~2 days), prednisone 40 mg/ QD (increased from 20 mg/ QD 8/21/24)    Chief Complaint: prednisone update    - Overall feels improved from last week's update  - 6-8 formed BM/d  - mild perianal fistula drainage; improved w/ abx use  - No nocturnal trips x 4 days  - No tenesmus episodes x 2 days  - Increased appetite from prednisone use; consuming 5 meals/ QD  - Tobacco cessation starting 8/31/24; utilizing nicotine patches  - OV: 9/11/24    Dr. Bhat & Dr. Foster will be updated.

## 2024-09-05 NOTE — TELEPHONE ENCOUNTER
Called & spoke to pt  - Instructed to taper prednisone dose to 35 mg/ QD  - Pt expressed understanding

## 2024-09-05 NOTE — TELEPHONE ENCOUNTER
----- Message from Jolynn Chicas, RN sent at 8/29/2024  4:33 PM CDT -----  Rinvoq 45mg daily     Prednisone 40mg (started 20mg/d on 8/9, increased to 40mg/d 8/21, 40mg/d 8/29)     CC: Dr. ABDI Foster on all notes

## 2024-09-06 DIAGNOSIS — K50.10 CROHN'S DISEASE OF COLON WITHOUT COMPLICATION: ICD-10-CM

## 2024-09-06 RX ORDER — PREDNISONE 10 MG/1
35 TABLET ORAL DAILY
Qty: 98 TABLET | Refills: 0 | Status: SHIPPED | OUTPATIENT
Start: 2024-09-06

## 2024-09-10 ENCOUNTER — OFFICE VISIT (OUTPATIENT)
Dept: GASTROENTEROLOGY | Facility: CLINIC | Age: 49
End: 2024-09-10
Payer: COMMERCIAL

## 2024-09-10 VITALS
SYSTOLIC BLOOD PRESSURE: 102 MMHG | WEIGHT: 228.19 LBS | HEIGHT: 74 IN | DIASTOLIC BLOOD PRESSURE: 73 MMHG | BODY MASS INDEX: 29.29 KG/M2 | OXYGEN SATURATION: 98 % | TEMPERATURE: 98 F | HEART RATE: 99 BPM

## 2024-09-10 DIAGNOSIS — K50.10 CROHN'S DISEASE OF COLON WITHOUT COMPLICATION: ICD-10-CM

## 2024-09-10 DIAGNOSIS — A49.8 RECURRENT CLOSTRIDIOIDES DIFFICILE INFECTION: Primary | ICD-10-CM

## 2024-09-10 PROCEDURE — 99215 OFFICE O/P EST HI 40 MIN: CPT | Mod: S$GLB,,, | Performed by: INTERNAL MEDICINE

## 2024-09-10 PROCEDURE — G2211 COMPLEX E/M VISIT ADD ON: HCPCS | Mod: S$GLB,,, | Performed by: INTERNAL MEDICINE

## 2024-09-10 RX ORDER — UPADACITINIB 45 MG/1
45 TABLET, EXTENDED RELEASE ORAL DAILY
Qty: 28 TABLET | Refills: 2 | Status: SHIPPED | OUTPATIENT
Start: 2024-09-10

## 2024-09-10 NOTE — PROGRESS NOTES
"     [facility]             Inflammatory Bowel Disease   Follow-up  Note              TODAY'S VISIT DATE:  9/10/2024    Chief Complaint:   Chief Complaint   Patient presents with    Crohn's Disease     PCP: Unable, To Obtain, declined referral today    Previous History:  Dharmesh Lozoya is a 49 y.o. male with Crohn's disease (pancolonic, perianal fistula), history of C diff (neg 12/2022, 5/2023).  Patient was doing well until 2015 and developed blood in his stools and saw family medicine doctor in [place] and had a flex sig and diagnosed "colitis."  He then had a colonoscopy by a GI doctor in [place] and recalls being told distal colitis and started on medication that was too expensive and took for 30 days (sounds like apriso) but too expensive and not effective so discontinued. At that time he did not have insurance so he proceeded with dietary changes (more fish, turmeric).  Within a few weeks symptoms resolved and back to baseline normal BMs 4-5 variable consistency but no blood and able to go back to normal lifestyle. In 2017 he moved from [place] to [place] and in 12/2022 began with worsening bloody diarrhea, cramping and weight loss. He was diagnosed with C diff and vancomycin started which helped significant with his symptoms. His symptoms went from 10-15 BMs/d to 8-12 BMs/d after oral vancomycin.   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  He was then placed on lialda 3.6 g/d with entocort 9 mg/d (decreased to 6 mg/d 3/2023) which he took for 1 month with some response and then discontinued due to ineffectiveness. He was hospitalized 3/2023 at [facility] for 1 days due to significant bloody diarrhea with weakness and " dehydration and he had ran out of entocort a week before and was off of lialda by that time. He was discharged home on prednisone 40 mg daily for 2 weeks and advised to f/u with his GI doctor. In 3/2023 he noticed abscess which spontaneously drained and also at that time had issues with hemorrhoids He then started stelara 4/12/23. In 5/2023 pt had good response in regards to bleeding and stool frequency and at that time C diff recurrent and treated with vancomycin x 10 days.  Approximately about 6 weeks after starting IV stelara noticed initially small pruritic blisters between toes and then progressed to head, arms, legs, feet, toes. These lesions were treated symptomatically with hydroxyzine and he took athlete's foot powder and then eventually got topical cream for horses that helped (antifungal/antibacterial). Diet he found if dairy, spicy foods, red sauce exacerbated symptoms.  Colonoscopy 12/29/23 significant for inflammation with congestion, erosions, friability and mucus in a contiuous and circumferential pattern from anus to sigmoid c/w moderate inflammation (hernandez score 3)-bx c/w moderate active inflammation, CMV neg. Perianal exam at time of scope c/w palpable lump c/w fistula with drainage. He discontinued stelara 12/6/23 due to rash which resolved after discontinutaiton.  In 12/2023 pt started on oral prednisone 40 mg daily for 3 weeks and then decreased to 30 mg/d around 1/20/24 and continued on this when he was initially seen in IBD clinic 2/6/24 at which time he was having 4-5 soft to formed BMs/d. Since first week of Jan 2024 his symptoms started improving. Patient was started on humira 40 mg every 2 weeks on 3/20/2024 and he continued prednisone taper. MRI pelvis on 3/11/24 was significant for left intersphincteric perianal fistula and rectal inflammation. In late 3/2024 he was started on canasa suppositories and we continued the prednisone taper. In 6/14/24, elected to start Rinvoq. Started  prednisone taper after one week of Rinvoq as symptoms had not yet significantly improved. By 7/3, had significant improvement with only 2-3 formed stools per day without blood, pain or urgency. Tapered prednisone weekly, but within 3 days of stopping, symptoms worsened significantly and prednisone was restarted at 20 mg on 8/9 then increased to 40 mg on 8/22 for persistent symptoms - 10 BMs per day with scant blood. He initially did exceptionally well after starting Rinvoq along with prednisone taper, but unfortunately symptoms worsened significantly after stopping prednisone taper on 8/6 and persisted despite restarting pred 20 on 8/9. Increased to prednisone 40 mg on 8/22 due to persistent symptoms and elevated calprotectin (overall unchanged since starting Rinvoq). At his office visit on 8/23 he was started on a 14d course of cipro/flagyl for perianal purulence noted on exam, likely from fistula though no obvious abscess. Different treatment options were discussed, mostly continuing prolonged trial of Rinvoq 45 mg for additional 3 mo vs switch to Remicade + immunomodulator given his perianal disease. If the latter pt will need repeat scope prior.    Interval History:  - current IBD meds: Rinvoq 45 mg po daily (started 6/14/24, 30 mg/d due to start 9/11/24), prednisone 35 mg/d (6/14/24-8/6/24, 20 mg/d 8/29, 40 mg/d 8/22, 35mg/d on 9/5)  - 5-6 soft to formed BM/day; one episode of blood on the toilet. No nocturnal BM.  No Abd pain  - no fistula discharge in the last 3 days; notices discharge mainly when he sits for long periods of time  - 8/23 - 9/7 - course of Cipro 500mg twice daily Metronidazole 500mg 3 times daily  - Tobacco cessation starting 8/31/24; utilizing nicotine patches 8-10 a day   - stool calprotectin: 6/14/24 1420,  8/16/24 1370  - CRP: 6/14/24 24.6; 8/23/24 2.2  - eye blisters inactive  - noticed some cramping in both hands in the last two weeks, not sure if related with stopping smoking  - NSAID  "use: No  - Narcotic use: No  - Alternative/complementary meds for IBD:   No    Prior Pertinent Surgeries:   None    Last pertinent Endoscopy/Imagin23 colonoscopy:  inflammation with congestion, erosions, friability ad mucus in a contiuous and circumferential pattern from anus to sigmoid c/w moderate inflammation (hernandez score 3). Scope not complete due to degree of inflammation. Biopsies showed rectum and sigmoid with moderately active inflammation with ulceration, CMV negative. On colonoscopy there was fistula with palpable lump "peanut" size with drainage.  3/11/24 MRI pelvis: Left intersphincteric perianal fistula at the left lateral approximate 4 o'clock position, measuring approximately 3.8 cm in length.  One suspected point of exit about the left gluteal fold.  Additional suspected blind-ending component about the left gluteal soft tissues.  Suspected granulation tissue about the visualized perianal fistula.  Diffuse rectal mucosal hyperenhancement and wall thickening, as well as inflammatory change about the perirectal fat and multiple prominent perirectal lymph nodes.Prostatomegaly.  Possible right-sided varicocele.      Therapeutic Drug Monitoring Labs:  24 ADM trough level <0.6 with Ab 432 (on humira 40mg Q2W)    Prior IBD Therapies:  Oral mesalamine (unclear which one but seems like apriso)- ineffective but only took for 4 weeks  lialda 3.6 g/d   Entocort 9 mg/d- ineffective  Prednisone - effective (losses response in doses 15 mg or less)  Methotrexate - d/c due to elevated ALT  Stelara 90mg SC every 8 weeks (23 - -) - d/c due to rash which resolved after stopping the medication  Humira (3/20/24 - 24) - d/c due to undetectable levels and high antibodies     Vaccinations:  Lab Results   Component Value Date    HEPBSURFABQU Negative 2024    HEPBSURFABQU <3 2024     Lab Results   Component Value Date    HEPAIGG Non-reactive 2024     Lab Results   Component Value " Date    VARICELLAZOS 1030.00 02/06/2024    VARICELLAINT Positive 02/06/2024     Lab Results   Component Value Date    MUMPSIGGSCRE <5.00 02/06/2024    MUMPSIGGINTE Negative 02/06/2024      Lab Results   Component Value Date    RUBEOLAIGGAN 64.20 02/06/2024    RUBEOLAINTER Positive 02/06/2024     Immunization History   Administered Date(s) Administered    Pneumococcal Conjugate - 20 Valent 06/14/2024    Zoster Recombinant 06/14/2024   Flu shot: recommended yearly   COVID vaccine/booster:  per CDC recommendations  RSV: after age 49 yo if high risk  Tetanus (Tdap):  last tetanus was over 10 years ago. Discuss and recommend   HPV: NA     Meningococcal: NA  Hepatitis B: defer to future visit   Hepatitis A: defer to future visit   MMR (live vaccine): not immune to mumps, immune to rubella      Shingrix: #2 due prior to 12/2024, defer until next visit       Review of Systems   Constitutional:  Negative for chills, fever and weight loss.   HENT:          No oral ulcers, dysphagia, oral thrush   Eyes:  Negative for blurred vision, pain and redness.   Respiratory:  Negative for cough and shortness of breath.    Cardiovascular:  Negative for chest pain.   Gastrointestinal:  Negative for abdominal pain, heartburn, nausea and vomiting.   Genitourinary:  Negative for dysuria and hematuria.   Musculoskeletal:  Negative for back pain and joint pain.   Skin:  Negative for rash.   Psychiatric/Behavioral:  Negative for depression. The patient is not nervous/anxious and does not have insomnia.      All Medical History/Surgical History/Family History/Social History/Allergies have been reviewed and updated in EMR    Outpatient Medications Marked as Taking for the 9/10/24 encounter (Office Visit) with Tianna Bhat MD   Medication Sig Dispense Refill    predniSONE (DELTASONE) 10 MG tablet Take 3.5 tablets (35 mg total) by mouth once daily. Taper per Dr. Bhat's instruction 98 tablet 0    upadacitinib (RINVOQ) 45 mg Tb24 Take 45 mg by  "mouth once daily. 28 tablet 2     Vital Signs:  /73 (BP Location: Left arm, Patient Position: Sitting)   Pulse 99   Temp 97.9 °F (36.6 °C)   Ht 6' 2" (1.88 m)   Wt 103.5 kg (228 lb 2.8 oz)   SpO2 98%   BMI 29.30 kg/m²    Physical Exam  Abdominal:      General: Bowel sounds are normal. There is no distension.      Palpations: Abdomen is soft.      Tenderness: There is no abdominal tenderness.   Genitourinary:     Comments: No visible perianal abscess or anal fissure. Purulent drainage noted in perianal area coming from fistula.    Labs:   Lab Results   Component Value Date    CRP 2.2 08/23/2024    CALPROTECTIN 1,420.0 (H) 06/14/2024     Lab Results   Component Value Date    HEPBSAG Non-reactive 02/06/2024    HEPBCAB Non-reactive 02/06/2024     Lab Results   Component Value Date    TBGOLDPLUS Negative 02/06/2024     Lab Results   Component Value Date    RHVOFHBN60FW 23 (L) 02/06/2024    QFDQJMEF12 348 02/06/2024     Lab Results   Component Value Date    WBC 11.08 08/23/2024    HGB 11.6 (L) 08/23/2024    HCT 39.2 (L) 08/23/2024    MCV 82 08/23/2024     (H) 08/23/2024     Lab Results   Component Value Date    CREATININE 1.0 08/23/2024    ALBUMIN 3.5 08/23/2024    BILITOT 0.3 08/23/2024    ALKPHOS 54 (L) 08/23/2024    AST 19 08/23/2024    ALT 26 08/23/2024     Assessment/Plan:  Dharmesh Lozoya is a 49 y.o. male with Crohn's disease (pan-colonic, perianal fistula), recurrent C diff (12/2022, 5/2023), elevated LFTs (ALT).     Patient reports improvements since restarting prednisone and completing 2 week course of cipro/flagyl. Given the positive response recommended an extended induction dose of rinvoq while we further taper prednisone. Patient to continue rinvoq 45mg daily for an additional 3 mo. Advised to taper prednisone from 35 to 30mg daily starting Thursday 9/12, then RN will continue to check with him weekly for further taper. Will check a stool calprotectin now and continue to monitor symptoms and " labs closely until his next office visit. If pt does not tolerate prednisone taper well while on extended induction dose of rinvoq 45mg will consider colonoscopy then possible treatment change to infliximab with immunomodulator combination due to perianal disease.     # Crohn's disease (pan-colonic, perianal fistula):    - continue rinvoq 45mg PO daily for and additional 3 months  - drop to prednisone 30 mg daily on Thursday 9/12, RN check for taper after one week (9/19)  - blisters- inactive on eyes- possible EIM vs alternate autoimmune disease though unlikely related to rinvoq, encouraged to take pictures and send to us and would consider referral to dermatology based on this  - basic labs:  repeat stool calprotectin  - drug monitoring labs: CBC/CMP q 3 mos (11/2024), lipid profile (8/2025),  TPMT (normal 2/2024), TB quantiferon (2/2025), Hep B testing (2/2025)    # Prostate- MRI pelvis showed prostatomegaly and possible right sided varicocele  - will consider urology referral at later date     # Immunodeficiency due to long term immunosuppressive drug therapy and IBD specific health maintenance:  CRC risk- sx 2015, pancolonic, surveillance colonoscopy q 1-2 years  Skin exam- yearly, recommended to schedule now  Risk for osteopenia/osteoporosis- on prednisone, recommend calcium/vit D  Vitamin D deficiency- currently not on supplement; recommend vit D3 5000- 2 pills daily for 3 mos and then 1 pill daily  Vaccines- no live vaccines, PCV20 and shingles vaccine #1 6/14/24, 2nd dose at next visit if off prednisone    I personally examined the patient and discussed above plan in collaboration with Janeen Lomas PharmD.      Total time: 40 min    Follow up in about 6 weeks (around 10/22/2024) for MD/Pharm D on Tues AM or Friday AM. RN weekly phone check in.     Tianna Bhat MD   Department of Gastroenterology  Medical Director, Inflammatory Bowel Disease

## 2024-09-10 NOTE — PATIENT INSTRUCTIONS
- decrease prednisone to 30 mg daily 9/12, thursday and we will continue checking in with your weekly  - continue rinvoq 45 mg oral daily and we will start authorization for another 3 mo course though if you fail pred taper then we will go with next plan of care previously outlined   - repeat stool calprotectin now - please get this done at [facility]  - skin exam yearly   - flu shot October 2024- can get this done at any local or [facility] pharmacy  - covid vaccine available and is recommended, you can get this at any local pharmacy

## 2024-09-10 NOTE — Clinical Note
Decrease pred from 35 mg to 30 mg on 9/12- check in with pt weekly for pred taper with next check in 9/18 please.

## 2024-09-18 ENCOUNTER — TELEPHONE (OUTPATIENT)
Dept: GASTROENTEROLOGY | Facility: CLINIC | Age: 49
End: 2024-09-18
Payer: COMMERCIAL

## 2024-09-18 NOTE — TELEPHONE ENCOUNTER
----- Message from Jolynn Chicas RN sent at 9/10/2024  9:47 AM CDT -----  Per Dr. Bhat: 9/12 decrease Pred to 30mg/d, recheck on 9/18  ----- Message -----  From: Florinda Aleman RN  Sent: 9/12/2024  12:00 AM CDT  To: Perlita Wynn Staff    Rinvoq 45mg daily     Prednisone 35mg (started 20mg/d on 8/9, increased to 40mg/d 8/21, 40mg/d 8/29, 35mg/d 9/5)     CC: Dr. ABDI Foster on all notes

## 2024-09-25 ENCOUNTER — TELEPHONE (OUTPATIENT)
Dept: GASTROENTEROLOGY | Facility: CLINIC | Age: 49
End: 2024-09-25
Payer: COMMERCIAL

## 2024-09-25 NOTE — TELEPHONE ENCOUNTER
----- Message from Florinda Aleman RN sent at 9/19/2024  8:54 AM CDT -----    Rinvoq 45mg daily      Prednisone 25mg (started 20mg/d on 8/9, increased to 40mg/d 8/21, 40mg/d 8/29, 35mg/d 9/5, 30 mg/ QD 9/12)

## 2024-09-25 NOTE — TELEPHONE ENCOUNTER
- CC: prednisone update  - CD pancolonic & perianal fistula  - Current IBD meds: Rinvoq 45 mg/ QD (started 6/14/24), prednisone 25 mg/ QD (tapered from 30 mg/ QD 9/12/24)    - Overall feels symptoms stable  - 3-4 soft-formed BM/ QD  - Occasional false trip consisting of gas only  - Pain when straining for a BM which takes ~20-30 minutes  - Will review w/ Dr. Bhat

## 2024-09-30 ENCOUNTER — TELEPHONE (OUTPATIENT)
Dept: GASTROENTEROLOGY | Facility: CLINIC | Age: 49
End: 2024-09-30
Payer: COMMERCIAL

## 2024-09-30 NOTE — TELEPHONE ENCOUNTER
- Per [facility], Rinvoq 45 mg appeal estimated time to determination is 10/9 although possibly may be done sooner  - Pt will be running out of Rinvoq 45 mg 10/8/24.  Called pt to discuss- LVM. MyChart sent.

## 2024-10-01 NOTE — TELEPHONE ENCOUNTER
Spoke with pt  - he confirmed that he has #14 Rinvoq 45 mg tabs on hand. He denied any missed doses.  - Rinvoq 45 mg appeal in process.  Will continue to follow.

## 2024-10-02 ENCOUNTER — TELEPHONE (OUTPATIENT)
Dept: GASTROENTEROLOGY | Facility: CLINIC | Age: 49
End: 2024-10-02
Payer: COMMERCIAL

## 2024-10-02 NOTE — TELEPHONE ENCOUNTER
----- Message from STEPHEN Neely sent at 9/26/2024  9:28 AM CDT -----  Rinvoq 45mg daily      Prednisone 20mg (started 20mg/d on 8/9, increased to 40mg/d 8/21, 40mg/d 8/29, 35mg/d 9/5, 30 mg/ QD 9/12, 20 mg/ QD 9/26)

## 2024-10-14 ENCOUNTER — TELEPHONE (OUTPATIENT)
Dept: ENDOSCOPY | Facility: HOSPITAL | Age: 49
End: 2024-10-14
Payer: COMMERCIAL

## 2024-10-14 ENCOUNTER — OFFICE VISIT (OUTPATIENT)
Dept: GASTROENTEROLOGY | Facility: CLINIC | Age: 49
End: 2024-10-14
Payer: COMMERCIAL

## 2024-10-14 VITALS
BODY MASS INDEX: 29.45 KG/M2 | SYSTOLIC BLOOD PRESSURE: 135 MMHG | WEIGHT: 229.5 LBS | HEART RATE: 70 BPM | DIASTOLIC BLOOD PRESSURE: 89 MMHG | HEIGHT: 74 IN | TEMPERATURE: 98 F | OXYGEN SATURATION: 99 %

## 2024-10-14 DIAGNOSIS — K60.30 PERIANAL FISTULA: ICD-10-CM

## 2024-10-14 DIAGNOSIS — A49.8 RECURRENT CLOSTRIDIOIDES DIFFICILE INFECTION: ICD-10-CM

## 2024-10-14 DIAGNOSIS — D84.821 IMMUNODEFICIENCY DUE TO LONG TERM IMMUNOSUPPRESSIVE DRUG THERAPY: ICD-10-CM

## 2024-10-14 DIAGNOSIS — K50.919 CROHN'S DISEASE WITH COMPLICATION, UNSPECIFIED GASTROINTESTINAL TRACT LOCATION: Primary | ICD-10-CM

## 2024-10-14 DIAGNOSIS — T45.1X5A IMMUNODEFICIENCY DUE TO LONG TERM IMMUNOSUPPRESSIVE DRUG THERAPY: ICD-10-CM

## 2024-10-14 DIAGNOSIS — K50.10 CROHN'S DISEASE OF COLON WITHOUT COMPLICATION: Primary | ICD-10-CM

## 2024-10-14 DIAGNOSIS — Z79.899 IMMUNODEFICIENCY DUE TO LONG TERM IMMUNOSUPPRESSIVE DRUG THERAPY: ICD-10-CM

## 2024-10-14 PROCEDURE — 99215 OFFICE O/P EST HI 40 MIN: CPT | Mod: S$GLB,,, | Performed by: INTERNAL MEDICINE

## 2024-10-14 PROCEDURE — G2211 COMPLEX E/M VISIT ADD ON: HCPCS | Mod: S$GLB,,, | Performed by: INTERNAL MEDICINE

## 2024-10-14 RX ORDER — PREDNISONE 10 MG/1
20 TABLET ORAL DAILY
Qty: 35 TABLET | Refills: 0 | Status: SHIPPED | OUTPATIENT
Start: 2024-10-14

## 2024-10-14 RX ORDER — ACETAMINOPHEN 500 MG
2000 TABLET ORAL WEEKLY
COMMUNITY

## 2024-10-14 NOTE — Clinical Note
Dr Bhat staff- please take note of the Prednisone plan below and continue to keep him on our Prednisone check-on list:  As of 10/14/24 office visit: current IBD meds: Rinvoq 45 mg po daily (started 6/14/24), prednisone 20 mg/d (6/14/24-8/6/24, 20 mg/d 8/29, 40 mg/d 8/22, 35mg/d 9/5, 30 mg/ QD 9/12, 25 mg/d 9/19, 20 mg/ QD 9/26, 15 mg/d 10/3, 20 mg/d 10/8)  Prednisone plan: continue prednisone 20 mg/day for now.  Taper instructions will be determined based upon repeat stool caprotectin result.  Tentatively plan to decrease prednisone by 5 mg/d weekly.

## 2024-10-14 NOTE — Clinical Note
Pt needs 2nd shingles vaccine by 12/2024.  He going to have scope in 12/2024.  Please coordinate this the same day as scope.

## 2024-10-14 NOTE — PATIENT INSTRUCTIONS
Labs today  Submit stool sample today  Prednisone taper will depend on stool results. Continue Prednisone 20 mg/day for now.  Continue Rinvoq 45 mg/day   Vitamin D instructions: vit D3 5000- 2 pills daily for 3 months and then 1 pill daily   Email us the dose of Calcium, Magnesium, Zinc that you take so we can update our records  Schedule total body skin exam with Dermatologist   Colonoscopy 12/2024- shingles vaccine same day

## 2024-10-14 NOTE — TELEPHONE ENCOUNTER
Spoke to pt to schedule procedure(s) Colonoscopy       Physician to perform procedure(s) Dr. MINH Bhat  Date of Procedure (s) 12/03/24  Arrival Time 1:20 PM  Time of Procedure(s) 2:20 PM   Location of Procedure(s) [facility] [address] [facility]  Type of Rx Prep sent to patient: Miralax  Instructions provided to patient via [facility]    Patient was informed on the following information and verbalized understanding. Screening questionnaire reviewed with patient and complete. If procedure requires anesthesia, a responsible adult needs to be present to accompany the patient home, patient cannot drive after receiving anesthesia. Appointment details are tentative, especially check-in time. Patient will receive a prep-op call 7 days prior to confirm check-in time for procedure. If applicable the patient should contact their pharmacy to verify Rx for procedure prep is ready for pick-up. Patient was advised to call the scheduling department at [phone] if pharmacy states no Rx is available. Patient was advised to call the endoscopy scheduling department if any questions or concerns arise.      [facility] Scheduling Department

## 2024-10-14 NOTE — TELEPHONE ENCOUNTER
"----- Message from STEPHEN Neil sent at 10/14/2024  3:36 PM CDT -----  Procedure: Colonoscopy    Diagnosis: Crohn's disease    Procedure Timin-12 weeks; 2024    #If within 4 weeks selected, please estuardo as high priority#    #If greater than 12 weeks, please select "5-12 weeks" and delay sending until 2 months prior to requested date#     Provider: MATIAS Bhat    Location: Any Site    Additional Scheduling Information: No scheduling concerns    Prep Specifications:Standard prep; Miralax/gatorade    Is the patient taking a GLP-1 Agonist:no    Have you attached a patient to this message: yes  "

## 2024-10-14 NOTE — PROGRESS NOTES
"       [facility]             Inflammatory Bowel Disease   Follow-up  Note              TODAY'S VISIT DATE:  10/14/2024    Chief Complaint:   Chief Complaint   Patient presents with    Crohn's Disease     PCP: Unable, To Obtain, declined referral today    Previous History:  Dharmesh Lozoya is a 49 y.o. male with Crohn's disease (pancolonic, perianal fistula), history of C diff (neg 12/2022, 5/2023).  Patient was doing well until 2015 and developed blood in his stools and saw family medicine doctor in [place] and had a flex sig and diagnosed "colitis."  He then had a colonoscopy by a GI doctor in [place] and recalls being told distal colitis and started on medication that was too expensive and took for 30 days (sounds like apriso) but too expensive and not effective so discontinued. At that time he did not have insurance so he proceeded with dietary changes (more fish, turmeric).  Within a few weeks symptoms resolved and back to baseline normal BMs 4-5 variable consistency but no blood and able to go back to normal lifestyle. In 2017 he moved from [place] to [place] and in 12/2022 began with worsening bloody diarrhea, cramping and weight loss. He was diagnosed with C diff and vancomycin started which helped significant with his symptoms. His symptoms went from 10-15 BMs/d to 8-12 BMs/d after oral vancomycin.   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  He was then placed on lialda 3.6 g/d with entocort 9 mg/d (decreased to 6 mg/d 3/2023) which he took for 1 month with some response and then discontinued due to ineffectiveness. He was hospitalized 3/2023 at [facility] for 1 days due to significant bloody diarrhea with weakness " and dehydration and he had ran out of entocort a week before and was off of lialda by that time. He was discharged home on prednisone 40 mg daily for 2 weeks and advised to f/u with his GI doctor. In 3/2023 he noticed abscess which spontaneously drained and also at that time had issues with hemorrhoids He then started stelara 4/12/23. In 5/2023 pt had good response in regards to bleeding and stool frequency and at that time C diff recurrent and treated with vancomycin x 10 days.  Approximately about 6 weeks after starting IV stelara noticed initially small pruritic blisters between toes and then progressed to head, arms, legs, feet, toes. These lesions were treated symptomatically with hydroxyzine and he took athlete's foot powder and then eventually got topical cream for horses that helped (antifungal/antibacterial). Diet he found if dairy, spicy foods, red sauce exacerbated symptoms.  Colonoscopy 12/29/23 significant for inflammation with congestion, erosions, friability and mucus in a contiuous and circumferential pattern from anus to sigmoid c/w moderate inflammation (hernandez score 3)-bx c/w moderate active inflammation, CMV neg. Perianal exam at time of scope c/w palpable lump c/w fistula with drainage. He discontinued stelara 12/6/23 due to rash which resolved after discontinutaiton.  In 12/2023 pt started on oral prednisone 40 mg daily for 3 weeks and then decreased to 30 mg/d around 1/20/24 and continued on this when he was initially seen in IBD clinic 2/6/24 at which time he was having 4-5 soft to formed BMs/d. Since first week of Jan 2024 his symptoms started improving. Patient was started on humira 40 mg every 2 weeks on 3/20/2024 and he continued prednisone taper. MRI pelvis on 3/11/24 was significant for left intersphincteric perianal fistula and rectal inflammation. In late 3/2024 he was started on canasa suppositories and we continued the prednisone taper. In 6/14/24, elected to start Rinvoq. Started  prednisone taper after one week of Rinvoq as symptoms had not yet significantly improved. By 7/3, had significant improvement with only 2-3 formed stools per day without blood, pain or urgency. Tapered prednisone weekly, but within 3 days of stopping, symptoms worsened significantly and prednisone was restarted at 20 mg on 8/9 then increased to 40 mg on 8/22 for persistent symptoms - 10 BMs per day with scant blood. He initially did exceptionally well after starting Rinvoq along with prednisone taper, but unfortunately symptoms worsened significantly after stopping prednisone taper on 8/6 and persisted despite restarting pred 20 on 8/9. Increased to prednisone 40 mg on 8/22 due to persistent symptoms and elevated calprotectin (overall unchanged since starting Rinvoq). At his office visit on 8/23 he was started on a 14d course of cipro/flagyl for perianal purulence noted on exam, likely from fistula though no obvious abscess. Different treatment options were discussed, mostly continuing prolonged trial of Rinvoq 45 mg for additional 3 mo vs switch to Remicade + immunomodulator given his perianal disease. If the latter pt will need repeat scope prior.On 9/10/24, patient reported improvements since restarting prednisone and completing 2 week course of cipro/flagyl. Given the positive response, recommended patient continue rinvoq 45 mg for an additional 3 months for extended induction while prednisone tapered.  I also recommended patient repeat stool calprotectin and continue to monitor symptoms and labs closely until his next office visit. If pt did not tolerate prednisone taper well while on extended induction dose of rinvoq 45mg, planned to consider colonoscopy then possible treatment change to infliximab with immunomodulator combination due to perianal disease.     Interval History:  - current IBD meds: Rinvoq 45 mg po daily (started 6/14/24), prednisone 20 mg/d (6/14/24-8/6/24, 20 mg/d 8/29, 40 mg/d 8/22, 35mg/d  ", 30 mg/ QD , 25 mg/d , 20 mg/ QD , 15 mg/d 10/3, 20 mg/d 10/8)  - Recent IBD meds: -  -  - course of Cipro 500mg twice daily Metronidazole 500mg 3 times daily  - 3-5 soft-formed BM/ QD; no blood since 10/8/24,  No abd pain. No urgency. Gassy only if drinks cokes.   - no fistula discharge; no rectal pain except if straining for BM (takes 30 mins to have BM usually)  - 24- 10/3/24- Patient reported stable symptoms so prednisone decreased weekly: 25 mg/d, 20 mg/d, 15 mg/d  - 10/8/24: Patient reported 3-4 BM/d with Noc BM and rectal bleeding (pt woken with gas early this am, had BM w spots of blood, followed by 2 more episodes with soft formed stool with blood). Denied mucous, tenesmus sxs, and pain. Prednisone increased to 20 mg/d and in-person office visit scheduled. Today, patient reports he ate [place] style bagel on 10/7 at 10 PM and these symptoms reported lasted only about 12 hours    - Tobacco cessation starting 24; utilizing nicotine patches 8-10 a day   - stool calprotectin: 24 1420,  24 1370  - CRP: 24 24.6; 24 2.2  - eye blisters inactive  - hand cramping every 2-3 days- improved since last visit  - NSAID use: No  - Narcotic use: No  - Alternative/complementary meds for IBD:   No    Prior Pertinent Surgeries:   None    Last pertinent Endoscopy/Imagin23 colonoscopy:  inflammation with congestion, erosions, friability ad mucus in a contiuous and circumferential pattern from anus to sigmoid c/w moderate inflammation (hernandez score 3). Scope not complete due to degree of inflammation. Biopsies showed rectum and sigmoid with moderately active inflammation with ulceration, CMV negative. On colonoscopy there was fistula with palpable lump "peanut" size with drainage.  3/11/24 MRI pelvis: Left intersphincteric perianal fistula at the left lateral approximate 4 o'clock position, measuring approximately 3.8 cm in length.  One suspected point of exit about the left " gluteal fold.  Additional suspected blind-ending component about the left gluteal soft tissues.  Suspected granulation tissue about the visualized perianal fistula.  Diffuse rectal mucosal hyperenhancement and wall thickening, as well as inflammatory change about the perirectal fat and multiple prominent perirectal lymph nodes.Prostatomegaly.  Possible right-sided varicocele.      Therapeutic Drug Monitoring Labs:  5/29/24 ADM trough level <0.6 with Ab 432 (on humira 40mg Q2W)    Prior IBD Therapies:  Oral mesalamine (unclear which one but seems like apriso)- ineffective but only took for 4 weeks  lialda 3.6 g/d   Entocort 9 mg/d- ineffective  Prednisone - effective (losses response in doses 15 mg or less)  Methotrexate - d/c due to elevated ALT  Stelara 90mg SC every 8 weeks (4/12/23 - 12-6/23) - d/c due to rash which resolved after stopping the medication  Humira (3/20/24 - 5/29/24) - d/c due to undetectable levels and high antibodies     Vaccinations:  Lab Results   Component Value Date    HEPBSURFABQU Negative 02/06/2024    HEPBSURFABQU <3 02/06/2024     Lab Results   Component Value Date    HEPAIGG Non-reactive 02/06/2024     Lab Results   Component Value Date    VARICELLAZOS 1030.00 02/06/2024    VARICELLAINT Positive 02/06/2024     Lab Results   Component Value Date    MUMPSIGGSCRE <5.00 02/06/2024    MUMPSIGGINTE Negative 02/06/2024      Lab Results   Component Value Date    RUBEOLAIGGAN 64.20 02/06/2024    RUBEOLAINTER Positive 02/06/2024     Immunization History   Administered Date(s) Administered    Pneumococcal Conjugate - 20 Valent 06/14/2024    Zoster Recombinant 06/14/2024   Flu shot: recommended yearly, declined today   COVID vaccine/booster:  per CDC recommendations  RSV: after age 49 yo if high risk  Tetanus (Tdap):  last tetanus was over 10 years ago. Defer while on high dose prednisone  HPV: NA     Meningococcal: NA  Hepatitis B: defer to future visit   Hepatitis A: defer to future visit   MMR  "(live vaccine): not immune to mumps, immune to rubella      Shingrix: #2 due prior to 12/14/2024-  staff message sent to request Shingrix #2 on same day as scope in 12/2024      Review of Systems   Constitutional:  Negative for chills, fever and weight loss.   HENT:          No oral ulcers, dysphagia, oral thrush   Eyes:  Negative for blurred vision, pain and redness.   Respiratory:  Negative for cough and shortness of breath.    Cardiovascular:  Negative for chest pain.   Gastrointestinal:  Negative for abdominal pain, heartburn, nausea and vomiting.   Genitourinary:  Negative for dysuria and hematuria.   Musculoskeletal:  Negative for back pain and joint pain.   Skin:  Negative for rash.   Psychiatric/Behavioral:  Negative for depression. The patient is not nervous/anxious and does not have insomnia.      All Medical History/Surgical History/Family History/Social History/Allergies have been reviewed and updated in EMR    Outpatient Medications Marked as Taking for the 10/14/24 encounter (Office Visit) with Tianna Bhat MD   Medication Sig Dispense Refill    CALCIUM-MAGNESIUM-ZINC ORAL Take by mouth. Dosing unknown      cholecalciferol, vitamin D3, (VITAMIN D3) 50 mcg (2,000 unit) Cap capsule Take 2,000 Units by mouth once a week.      predniSONE (DELTASONE) 10 MG tablet Take 2 tablets (20 mg total) by mouth once daily. Taper per Dr Bhat's instruction 35 tablet 0    upadacitinib (RINVOQ) 45 mg Tb24 Take 45 mg by mouth once daily. 28 tablet 2    [DISCONTINUED] predniSONE (DELTASONE) 10 MG tablet Take 3.5 tablets (35 mg total) by mouth once daily. Taper per Dr. Bhat's instruction 98 tablet 0     Vital Signs:  /89   Pulse 70   Temp 97.9 °F (36.6 °C) (Temporal)   Ht 6' 2" (1.88 m)   Wt 104.1 kg (229 lb 8 oz)   SpO2 99%   BMI 29.47 kg/m²    Physical Exam  Abdominal:      General: Bowel sounds are normal. There is no distension.      Palpations: Abdomen is soft.      Tenderness: There is no abdominal " tenderness.   Genitourinary:     Comments: No visible perianal abscess or anal fissure. Purulent drainage noted in perianal area coming from fistula.    Labs:   Lab Results   Component Value Date    CRP 2.2 08/23/2024    CALPROTECTIN 1,420.0 (H) 06/14/2024     Lab Results   Component Value Date    HEPBSAG Non-reactive 02/06/2024    HEPBCAB Non-reactive 02/06/2024     Lab Results   Component Value Date    TBGOLDPLUS Negative 02/06/2024     Lab Results   Component Value Date    NREDEXBJ12EV 23 (L) 02/06/2024    EOGKMMXU45 348 02/06/2024     Lab Results   Component Value Date    WBC 11.08 08/23/2024    HGB 11.6 (L) 08/23/2024    HCT 39.2 (L) 08/23/2024    MCV 82 08/23/2024     (H) 08/23/2024     Lab Results   Component Value Date    CREATININE 1.0 08/23/2024    ALBUMIN 3.5 08/23/2024    BILITOT 0.3 08/23/2024    ALKPHOS 54 (L) 08/23/2024    AST 19 08/23/2024    ALT 26 08/23/2024     Assessment/Plan:  Dharmesh Lozoya is a 49 y.o. male with Crohn's disease (pan-colonic, perianal fistula), recurrent C diff (12/2022, 5/2023), elevated LFTs (ALT).     Since last office visit, patient continues on Rinvoq 45 mg/d extended induction.  His prednisone dose was tapered down to 15 mg/d, and then patient reported worsening symptoms 5 days later (nocturnal BM, rectal bleeding, blood in the stool).  Therefore, his prednisone dose was increased back to 20 mg/d.  Today, patient clarified that his increase in symptoms occurred after eating a bagel and only lasted 12 hours.  Since then, patient reports overall feeling very well.  He also denies any symptoms of perianal disease.  Patient to continue Rinvoq 45 mg/d extended induction for an additional 2 months.  Recommend repeat calprotectin now to guide prednisone taper.  Plan for scope 12/2024 to assess endoscopic response to Rinvoq when off prednisone.      # Crohn's disease (pan-colonic, perianal fistula):    - continue rinvoq 45mg PO daily for an additional 2 months  - continue  prednisone 20 mg/day.  Taper instructions will be determined based upon repeat stool caprotectin result.  Tentatively plan to decrease prednisone by 5 mg/d weekly.  Prednisone refill sent to local pharmacy today.   - blisters- inactive on eyes- possible EIM vs alternate autoimmune disease though unlikely related to rinvoq, encouraged to take pictures and send to us and would consider referral to dermatology based on this  - stool calprotectin now  - colonoscopy 12/2024 (after 6 months of Rinvoq and when tapered off prednisone).  Prep: Miralax + Gatoraid.  Patient to meet with colonoscopy  today.  - drug monitoring labs: CBC/CMP q 3 mos (today), lipid profile (8/2025),  TPMT (normal 2/2024), TB quantiferon (2/2025), Hep B testing (2/2025)    # Prostate- MRI pelvis showed prostatomegaly and possible right sided varicocele  - will consider urology referral at later date     # Immunodeficiency due to long term immunosuppressive drug therapy and IBD specific health maintenance:  CRC risk- sx 2015, pancolonic, surveillance colonoscopy q 1-2 years.  Next scope 12/2024  Skin exam- yearly, recommended to schedule now  Risk for osteopenia/osteoporosis- on prednisone, taking calcium/vit D- patient unsure of Calcium dose, asked him to email me  Vitamin D deficiency- previously recommend vit D3 5000- 2 pills daily for 3 mos and then 1 pill daily.  Patient misunderstood dosing instructions and has been taking Vit D3 2,000 IU weekly for 2 months.  Reminded patient of recommended dosing.  Vaccines- no live vaccines, declined high dose flu shot today.  Patient aware of increased risk of infection while on Rinvoq 45 mg/d. Shingles vaccine #2 in 12/2024 (same day as scope- staff to coordinate). Defer HepA, HepB, and Tdap until off high dose prednisone.     Visit today is associated with current or anticipated ongoing medical care related to this patient's single serious condition/complex condition Crohn's disease.     I  personally examined the patient and discussed above plan in collaboration with [place] PharmD.      Follow up in about 3 months (around 1/14/2025). RN weekly phone check in.     Tianna Bhat MD   Department of Gastroenterology  Medical Director, Inflammatory Bowel Disease

## 2024-10-17 ENCOUNTER — TELEPHONE (OUTPATIENT)
Dept: GASTROENTEROLOGY | Facility: CLINIC | Age: 49
End: 2024-10-17
Payer: COMMERCIAL

## 2024-10-17 NOTE — TELEPHONE ENCOUNTER
----- Message from RN Jolynn sent at 10/14/2024  4:39 PM CDT -----  Did he turn in stool? [facility]?     Pred taper depends on this result    As of 10/14/24 office visit:   current IBD meds: Rinvoq 45 mg po daily (started 6/14/24)    Prednisone 20 mg/d (6/14/24-8/6/24, 20 mg/d 8/29, 40 mg/d 8/22, 35mg/d 9/5, 30 mg/ QD 9/12, 25 mg/d 9/19, 20 mg/ QD 9/26, 15 mg/d 10/3, 20 mg/d 10/8)     Prednisone plan: continue prednisone 20 mg/day for now.  Taper instructions will be determined based upon repeat stool caprotectin result.  Tentatively plan to decrease prednisone by 5 mg/d weekly.      Colonoscopy: 12/3/24  OV: 1/31/25    Copy Dr. Pawan Foster on all notes per Dr. Bhat

## 2024-10-17 NOTE — TELEPHONE ENCOUNTER
Spoke with Dharmesh:  CD, pancolonic, perianal fistula     IBD meds:  - Rinvoq 45mg/d    Prednisone 20 mg/d (6/14/24-8/6/24, 20 mg/d 8/29, 40 mg/d 8/22, 35mg/d 9/5, 30 mg/ QD 9/12, 25 mg/d 9/19, 20 mg/ QD 9/26, 15 mg/d 10/3, 20 mg/d 10/8)     - 3-5 BM/d, soft formed  - 1 false BR trip/d  - denies blood, mucous, noc BM, and pain  - plans to turn in stool sample tomorrow to [facility]    - Colonoscopy: 12/3/24  - OV: 1/31/25    Dr. Bhat will be updated.

## 2024-10-28 ENCOUNTER — TELEPHONE (OUTPATIENT)
Dept: GASTROENTEROLOGY | Facility: CLINIC | Age: 49
End: 2024-10-28
Payer: COMMERCIAL

## 2024-11-04 ENCOUNTER — TELEPHONE (OUTPATIENT)
Dept: GASTROENTEROLOGY | Facility: CLINIC | Age: 49
End: 2024-11-04
Payer: COMMERCIAL

## 2024-11-04 NOTE — TELEPHONE ENCOUNTER
----- Message from STEPHEN Jolynn sent at 10/28/2024  4:25 PM CDT -----  CD, pancolonic, perianal fistula     IBD meds:   - Rinvoq 45 mg po daily (started 6/14/24)      Prednisone 10 mg/d (6/14/24-8/6/24, 20 mg/d 8/29, 40 mg/d 8/22, 35mg/d 9/5, 30 mg/ QD 9/12, 25 mg/d 9/19, 20 mg/ QD 9/26, 15 mg/d 10/3, 20 mg/d 10/8, 15mg/d 10/21, 10mg/d 10/28)      Prednisone plan: continue prednisone 20 mg/day for now.  Taper instructions will be determined based upon repeat stool caprotectin result.  Tentatively plan to decrease prednisone by 5 mg/d weekly.       Colonoscopy: 12/3/24   OV: 1/31/25    SS

## 2024-11-04 NOTE — TELEPHONE ENCOUNTER
Per PM:  CD, pancolonic, perianal fistula      IBD meds:   - Rinvoq 45 mg po daily (started 6/14/24)      Prednisone 10 mg/d (6/14/24-8/6/24, 20 mg/d 8/29, 40 mg/d 8/22, 35mg/d 9/5, 30 mg/ QD 9/12, 25 mg/d 9/19, 20 mg/ QD 9/26, 15 mg/d 10/3, 20 mg/d 10/8, 15mg/d 10/21, 10mg/d 10/28)        - BM/d: 3-5  - mucous: pt did not address  - false BR trips/d: pt did not address  - Denies: fever, N/V, blood, urgency, noc BM, pain  - Overall feels: same as he reports no changes    - Colonoscopy: 12/3/24   - OV: 1/31/25    Dr. Bhat will be updated. Tentative plan is to taper by 5mg/wk.

## 2024-11-05 NOTE — TELEPHONE ENCOUNTER
PM sent to pt to decrease Prednisone to 5mg daily and stop in 1 wk per Dr. Bhat's recommendation.

## 2024-11-05 NOTE — TELEPHONE ENCOUNTER
Tianna Bhat MD to Perlita Wynn Staff       11/5/24  9:46 AM   Okay to decrease to pred 5 mg daily and stop in 1 week  Pt should let us know if any worsening symptoms    SS

## 2024-11-19 ENCOUNTER — TELEPHONE (OUTPATIENT)
Dept: GASTROENTEROLOGY | Facility: CLINIC | Age: 49
End: 2024-11-19
Payer: COMMERCIAL

## 2024-11-19 NOTE — TELEPHONE ENCOUNTER
Called [facility] and spoke with Monika Colby  - clarification provided on length of induction extension  - answered all questions.   - [facility] can dispense tomorrow

## 2024-11-19 NOTE — TELEPHONE ENCOUNTER
----- Message from STEPHEN Neil sent at 11/19/2024 10:44 AM CST -----  Regarding: FW: Medication Clarity Needed  Contact: [phone]  I may have forwarded this before, I apologize if this is a 2nd time you are seeing it  ----- Message -----  From: Arcelia Jefferson  Sent: 11/19/2024   9:49 AM CST  To: Perlita Wynn Staff  Subject: Medication Clarity Needed                        Pharmacy Calling to Clarify a Rx:    Name of Caller: Tanvi thapa [facility]    Pharmacy Name:   [facility] - [address]  [address] [address]  Phone: [phone] Fax: [phone]    Rx Name: upadacitinib (RINVOQ) 45 mg Tb24    What need to be Clarify: Instructions    Best call back #:[phone]

## 2024-11-25 ENCOUNTER — PATIENT MESSAGE (OUTPATIENT)
Dept: ENDOSCOPY | Facility: HOSPITAL | Age: 49
End: 2024-11-25
Payer: COMMERCIAL

## 2024-12-03 ENCOUNTER — ANESTHESIA (OUTPATIENT)
Dept: ENDOSCOPY | Facility: HOSPITAL | Age: 49
End: 2024-12-03
Payer: COMMERCIAL

## 2024-12-03 ENCOUNTER — CLINICAL SUPPORT (OUTPATIENT)
Dept: GASTROENTEROLOGY | Facility: CLINIC | Age: 49
End: 2024-12-03
Payer: COMMERCIAL

## 2024-12-03 ENCOUNTER — ANESTHESIA EVENT (OUTPATIENT)
Dept: ENDOSCOPY | Facility: HOSPITAL | Age: 49
End: 2024-12-03
Payer: COMMERCIAL

## 2024-12-03 ENCOUNTER — HOSPITAL ENCOUNTER (OUTPATIENT)
Facility: HOSPITAL | Age: 49
Discharge: HOME | End: 2024-12-03
Attending: INTERNAL MEDICINE | Admitting: INTERNAL MEDICINE
Payer: COMMERCIAL

## 2024-12-03 VITALS
BODY MASS INDEX: 29.52 KG/M2 | TEMPERATURE: 98 F | HEIGHT: 74 IN | WEIGHT: 230 LBS | OXYGEN SATURATION: 97 % | HEART RATE: 66 BPM | DIASTOLIC BLOOD PRESSURE: 69 MMHG | RESPIRATION RATE: 18 BRPM | SYSTOLIC BLOOD PRESSURE: 106 MMHG

## 2024-12-03 DIAGNOSIS — Z79.899 IMMUNODEFICIENCY DUE TO LONG TERM IMMUNOSUPPRESSIVE DRUG THERAPY: ICD-10-CM

## 2024-12-03 DIAGNOSIS — T45.1X5A IMMUNODEFICIENCY DUE TO LONG TERM IMMUNOSUPPRESSIVE DRUG THERAPY: ICD-10-CM

## 2024-12-03 DIAGNOSIS — K50.90 CROHN DISEASE: ICD-10-CM

## 2024-12-03 DIAGNOSIS — K50.10 CROHN'S DISEASE OF COLON WITHOUT COMPLICATION: Primary | ICD-10-CM

## 2024-12-03 DIAGNOSIS — D84.821 IMMUNODEFICIENCY DUE TO LONG TERM IMMUNOSUPPRESSIVE DRUG THERAPY: ICD-10-CM

## 2024-12-03 PROCEDURE — A4216 STERILE WATER/SALINE, 10 ML: HCPCS | Performed by: NURSE ANESTHETIST, CERTIFIED REGISTERED

## 2024-12-03 PROCEDURE — 37000008 HC ANESTHESIA 1ST 15 MINUTES: Performed by: INTERNAL MEDICINE

## 2024-12-03 PROCEDURE — 88305 TISSUE EXAM BY PATHOLOGIST: CPT | Mod: 26,,, | Performed by: STUDENT IN AN ORGANIZED HEALTH CARE EDUCATION/TRAINING PROGRAM

## 2024-12-03 PROCEDURE — 90471 IMMUNIZATION ADMIN: CPT | Mod: S$GLB,,, | Performed by: INTERNAL MEDICINE

## 2024-12-03 PROCEDURE — 88342 IMHCHEM/IMCYTCHM 1ST ANTB: CPT | Mod: 26,,, | Performed by: STUDENT IN AN ORGANIZED HEALTH CARE EDUCATION/TRAINING PROGRAM

## 2024-12-03 PROCEDURE — 45380 COLONOSCOPY AND BIOPSY: CPT | Performed by: INTERNAL MEDICINE

## 2024-12-03 PROCEDURE — 63600175 PHARM REV CODE 636 W HCPCS: Performed by: NURSE ANESTHETIST, CERTIFIED REGISTERED

## 2024-12-03 PROCEDURE — 25000003 PHARM REV CODE 250: Performed by: NURSE ANESTHETIST, CERTIFIED REGISTERED

## 2024-12-03 PROCEDURE — 37000009 HC ANESTHESIA EA ADD 15 MINS: Performed by: INTERNAL MEDICINE

## 2024-12-03 PROCEDURE — 90750 HZV VACC RECOMBINANT IM: CPT | Mod: S$GLB,,, | Performed by: INTERNAL MEDICINE

## 2024-12-03 PROCEDURE — 45380 COLONOSCOPY AND BIOPSY: CPT | Mod: ,,, | Performed by: INTERNAL MEDICINE

## 2024-12-03 PROCEDURE — 27201012 HC FORCEPS, HOT/COLD, DISP: Performed by: INTERNAL MEDICINE

## 2024-12-03 PROCEDURE — 88342 IMHCHEM/IMCYTCHM 1ST ANTB: CPT | Mod: 59 | Performed by: STUDENT IN AN ORGANIZED HEALTH CARE EDUCATION/TRAINING PROGRAM

## 2024-12-03 PROCEDURE — 88305 TISSUE EXAM BY PATHOLOGIST: CPT | Performed by: STUDENT IN AN ORGANIZED HEALTH CARE EDUCATION/TRAINING PROGRAM

## 2024-12-03 RX ORDER — PROPOFOL 10 MG/ML
INJECTION, EMULSION INTRAVENOUS CONTINUOUS PRN
Status: DISCONTINUED | OUTPATIENT
Start: 2024-12-03 | End: 2024-12-03

## 2024-12-03 RX ORDER — PROPOFOL 10 MG/ML
VIAL (ML) INTRAVENOUS
Status: DISCONTINUED | OUTPATIENT
Start: 2024-12-03 | End: 2024-12-03

## 2024-12-03 RX ORDER — SODIUM CHLORIDE 9 MG/ML
INJECTION, SOLUTION INTRAVENOUS CONTINUOUS
Status: DISCONTINUED | OUTPATIENT
Start: 2024-12-03 | End: 2024-12-03 | Stop reason: HOSPADM

## 2024-12-03 RX ORDER — SODIUM CHLORIDE 0.9 % (FLUSH) 0.9 %
SYRINGE (ML) INJECTION
Status: DISCONTINUED | OUTPATIENT
Start: 2024-12-03 | End: 2024-12-03

## 2024-12-03 RX ORDER — LIDOCAINE HYDROCHLORIDE 20 MG/ML
INJECTION, SOLUTION EPIDURAL; INFILTRATION; INTRACAUDAL; PERINEURAL
Status: DISCONTINUED | OUTPATIENT
Start: 2024-12-03 | End: 2024-12-03

## 2024-12-03 RX ADMIN — PROPOFOL 50 MG: 10 INJECTION, EMULSION INTRAVENOUS at 12:12

## 2024-12-03 RX ADMIN — PROPOFOL 150 MG: 10 INJECTION, EMULSION INTRAVENOUS at 12:12

## 2024-12-03 RX ADMIN — SODIUM CHLORIDE 10 ML: 9 INJECTION, SOLUTION INTRAMUSCULAR; INTRAVENOUS; SUBCUTANEOUS at 01:12

## 2024-12-03 RX ADMIN — LIDOCAINE HYDROCHLORIDE 100 MG: 20 INJECTION, SOLUTION EPIDURAL; INFILTRATION; INTRACAUDAL; PERINEURAL at 12:12

## 2024-12-03 RX ADMIN — PROPOFOL 150 MCG/KG/MIN: 10 INJECTION, EMULSION INTRAVENOUS at 12:12

## 2024-12-03 NOTE — [employer]OVATION PATIENT INSTRUCTIONS
Discharge Summary/Instructions after an Endoscopic Procedure  Patient Name: Dharmesh Lozoya  Patient MRN: [id number]  Patient YOB: 1975  Tuesday, December 3, 2024  Tianna Bhat MD  Dear patient,  As a result of recent federal legislation (The Federal Cures Act), you may   receive lab or pathology results from your procedure in your [facility]   account before your physician is able to contact you. Your physician or   their representative will relay the results to you with their   recommendations at their soonest availability.  Thank you,  RESTRICTIONS:  During your procedure today, you received medications for sedation.  These   medications may affect your judgment, balance and coordination.  Therefore,   for 24 hours, you have the following restrictions:   - DO NOT drive a car, operate machinery, make legal/financial decisions,   sign important papers or drink alcohol.    ACTIVITY:  Today: no heavy lifting, straining or running due to procedural   sedation/anesthesia.  The following day: return to full activity including work.  DIET:  Eat and drink normally unless instructed otherwise.     TREATMENT FOR COMMON SIDE EFFECTS:  - Mild abdominal pain, nausea, belching, bloating or excessive gas:  rest,   eat lightly and use a heating pad.  - Sore Throat: treat with throat lozenges and/or gargle with warm salt   water.  - Because air was used during the procedure, expelling large amounts of air   from your rectum or belching is normal.  - If a bowel prep was taken, you may not have a bowel movement for 1-3 days.    This is normal.  SYMPTOMS TO WATCH FOR AND REPORT TO YOUR PHYSICIAN:  1. Abdominal pain or bloating, other than gas cramps.  2. Chest pain.  3. Back pain.  4. Signs of infection such as: chills or fever occurring within 24 hours   after the procedure.  5. Rectal bleeding, which would show as bright red, maroon, or black stools.   (A tablespoon of blood from the rectum is not serious, especially if    hemorrhoids are present.)  6. Vomiting.  7. Weakness or dizziness.  GO DIRECTLY TO THE NEAREST EMERGENCY ROOM IF YOU HAVE ANY OF THE FOLLOWING:      Difficulty breathing              Chills and/or fever over 101 F   Persistent vomiting and/or vomiting blood   Severe abdominal pain   Severe chest pain   Black, tarry stools   Bleeding- more than one tablespoon   Any other symptom or condition that you feel may need urgent attention  Your doctor recommends these additional instructions:  If any biopsies were taken, your doctors clinic will contact you in 1 to 2   weeks with any results.  - Discharge patient to home.   - Patient has a contact number available for emergencies.  The signs and   symptoms of potential delayed complications were discussed with the   patient.  Return to normal activities tomorrow.  Written discharge   instructions were provided to the patient.   - Resume previous diet.   - Continue present medications.   - Await pathology results.   - Repeat colonoscopy (date not yet determined).   - Return to GI clinic as previously scheduled.   For questions, problems or results please call your physician - Tianna Bhat MD at Work:  [phone].  [facility], EMERGENCY ROOM PHONE NUMBER: [phone]  IF A COMPLICATION OR EMERGENCY SITUATION ARISES AND YOU ARE UNABLE TO REACH   YOUR PHYSICIAN - GO DIRECTLY TO THE EMERGENCY ROOM.  Tianna Bhat MD  12/3/2024 1:15:05 PM  This report has been verified and signed electronically.  Dear patient,  As a result of recent federal legislation (The Federal Cures Act), you may   receive lab or pathology results from your procedure in your [facility]   account before your physician is able to contact you. Your physician or   their representative will relay the results to you with their   recommendations at their soonest availability.  Thank you,  [employer]ATION

## 2024-12-03 NOTE — PROGRESS NOTES
Dharmesh presented ambulatory. Reviewed questionnaire specific to Shingrix dose #2 vaccine, no positive exclusions noted. Vaccination given without incidence. He was escorted ambulatory to the Waiting Area and instructed to wait for 15 min and may leave as long as no adverse reaction noted. He/She states understanding and agrees.

## 2024-12-03 NOTE — H&P
Short Stay Endoscopy History and Physical    PCP - Unable, To Obtain  Referring Physician - Tianna Bhat MD  [address] [place] [address]    Procedure - Colonoscopy  ASA - per anesthesia  Mallampati - per anesthesia  History of Anesthesia problems - no  Family history Anesthesia problems -  no   Plan of anesthesia - General    HPI  49 y.o. male  Reason for procedure:   Crohn's f/u    ROS:  Constitutional: No fevers, chills, No weight loss  CV: No chest pain  Pulm: No cough, No shortness of breath  GI: see HPI    Medical History:  has a past medical history of Crohn's disease and Recurrent Clostridioides difficile infection.    Surgical History:  has no past surgical history on file.    Family History: family history includes Anuerysm in his brother; Heart attack in his father and mother; Hypertension in his father and mother; Inflammatory bowel disease in his father; Seizures in his brother..    Social History:  reports that he has quit smoking. His smoking use included cigarettes. He started smoking about 15 years ago. He has a 15.8 pack-year smoking history. His smokeless tobacco use includes snuff. He reports current alcohol use. He reports that he does not use drugs.    Review of patient's allergies indicates:   Allergen Reactions    Stelara [ustekinumab] Blisters       Medications:   Medications Prior to Admission   Medication Sig Dispense Refill Last Dose/Taking    CALCIUM-MAGNESIUM-ZINC ORAL Take by mouth. Dosing unknown   Past Week    cholecalciferol, vitamin D3, (VITAMIN D3) 50 mcg (2,000 unit) Cap capsule Take 2,000 Units by mouth once a week.   Past Week    predniSONE (DELTASONE) 10 MG tablet Take 2 tablets (20 mg total) by mouth once daily. Taper per Dr Bhat's instruction 35 tablet 0 Past Month    upadacitinib (RINVOQ) 45 mg Tb24 Take 45 mg by mouth once daily. 28 tablet 2 12/3/2024    upadacitinib 30 mg Tb24 Take 30 mg by mouth once daily. (Patient not taking: Reported on  10/14/2024) 30 tablet 2        Physical Exam:    Vital Signs:   Vitals:    12/03/24 1159   BP: 113/82   Pulse: 80   Resp: 16   Temp: 99 °F (37.2 °C)       General Appearance: Well appearing in no acute distress  Abdomen: Soft, non tender, non distended with normal bowel sounds, no masses    Labs:  Lab Results   Component Value Date    WBC 9.36 10/14/2024    HGB 12.2 (L) 10/14/2024    HCT 40.6 10/14/2024     10/14/2024    CHOL 201 (H) 08/23/2024    TRIG 86 08/23/2024    HDL 42 08/23/2024    ALT 35 10/14/2024    AST 22 10/14/2024     10/14/2024    K 4.5 10/14/2024     10/14/2024    CREATININE 1.0 10/14/2024    BUN 11 10/14/2024    CO2 28 10/14/2024    TSH 0.411 02/06/2024       I have explained the risks and benefits of this endoscopic procedure to the patient including but not limited to bleeding, inflammation, infection, perforation, and death.      Tianna Bhat MD

## 2024-12-03 NOTE — ANESTHESIA PREPROCEDURE EVALUATION
12/03/2024  Dharmesh Lozoya is a 49 y.o., male with crohn's disease here for colonoscopy.    No results found for this or any previous visit.      Pre-op Assessment    I have reviewed the Patient Summary Reports.     I have reviewed the Nursing Notes. I have reviewed the NPO Status.      Review of Systems         Anesthesia Plan  Type of Anesthesia, risks & benefits discussed:    Anesthesia Type: Gen Natural Airway  Intra-op Monitoring Plan: Standard ASA Monitors  Post Op Pain Control Plan: multimodal analgesia  Induction:  IV  Informed Consent: Informed consent signed with the Patient and all parties understand the risks and agree with anesthesia plan.  All questions answered.   ASA Score: 2    Ready For Surgery From Anesthesia Perspective.     .

## 2024-12-03 NOTE — TRANSFER OF CARE
Anesthesia Transfer of Care Note    Patient: Dharmesh Lozoya    Procedure(s) Performed: Procedure(s) (LRB):  COLONOSCOPY (N/A)    Patient location: GI    Anesthesia Type: general    Transport from OR: Transported from OR on room air with adequate spontaneous ventilation    Post pain: adequate analgesia    Post assessment: no apparent anesthetic complications    Post vital signs: stable    Level of consciousness: sedated    Nausea/Vomiting: no nausea/vomiting    Complications: none    Transfer of care protocol was followed      Last vitals: Visit Vitals  /66   Pulse 98   Temp 36.5   Resp 28   Ht    Wt    SpO2 96%   BMI

## 2024-12-03 NOTE — ANESTHESIA POSTPROCEDURE EVALUATION
Anesthesia Post Evaluation    Patient: Dharmesh Lozoya    Procedure(s) Performed: Procedure(s) (LRB):  COLONOSCOPY (N/A)    Final Anesthesia Type: general      Patient location during evaluation: GI PACU  Patient participation: Yes- Able to Participate  Level of consciousness: awake and alert  Post-procedure vital signs: reviewed and stable  Pain management: adequate  Airway patency: patent    PONV status at discharge: No PONV  Anesthetic complications: no      Cardiovascular status: blood pressure returned to baseline  Respiratory status: spontaneous ventilation  Hydration status: euvolemic  Follow-up not needed.              Vitals Value Taken Time   /69 12/03/24 1343   Temp 36.4 °C (97.6 °F) 12/03/24 1314   Pulse 66 12/03/24 1343   Resp 18 12/03/24 1343   SpO2 97 % 12/03/24 1343         Event Time   Out of Recovery 13:51:24         Pain/Renetta Score: Renetta Score: 10 (12/3/2024  1:15 PM)

## 2024-12-06 LAB
FINAL PATHOLOGIC DIAGNOSIS: NORMAL
GROSS: NORMAL
Lab: NORMAL
MICROSCOPIC EXAM: NORMAL

## 2024-12-09 ENCOUNTER — TELEPHONE (OUTPATIENT)
Dept: GASTROENTEROLOGY | Facility: CLINIC | Age: 49
End: 2024-12-09
Payer: COMMERCIAL

## 2024-12-09 NOTE — TELEPHONE ENCOUNTER
Kin Jackson MD  You19 minutes ago (4:16 PM)     Thanks for forwarding.  I am happy to send in a prescription for Bentyl.  If he is having more tenesmus symptoms then that would fit with the ongoing rectal inflammation that was seen during his scope.  I do not know if he has ever been on Canasa suppositories or Rowasa enemas in the past but that would be my 1st thought to try to help get his symptoms under control.  If these have not been effective in the past that we could certainly try some steroid suppositories or enemas.

## 2024-12-09 NOTE — TELEPHONE ENCOUNTER
Spoke with Dharmesh:  - he has used Canasa in the past, did have trouble keeping it in  - reports having several weeks of this on hand and willing to try as prescribed qHS  - enc him to let us know if sxs worsen  - he states understanding and agrees with this plan    Dr. Jackson will be updated.

## 2024-12-09 NOTE — TELEPHONE ENCOUNTER
----- Message from Tianna Bhat MD sent at 12/9/2024  6:34 AM CST -----  I would like to see him in net 1-2 weeks. Please schedule him to see me   If overbook needed I would do Tues or Friday when tiki is with me since she knows him well   BRIEF OPERATIVE NOTE    Date of Procedure: 7/31/2019   Preoperative Diagnosis: SCREENING  Postoperative Diagnosis: External hemorrhoids, 2 flat 1 cm round lesions left posterior region, Diverticulosis left colon, Widely patent anastomosis    Procedure(s):  COLONOSCOPY (LATEX ALLERGY)  Surgeon(s) and Role:     * Doug Ca MD - Primary         Surgical Assistant: none    Surgical Staff:  Circ-1: Bethany Beard RN  Circ-2: Doyle Suarez  Scrub Tech-1: Dorotha Cancer  Event Time In Time Out   Incision Start 1335    Incision Close 0849      Anesthesia: MAC   Estimated Blood Loss: none  Specimens: * No specimens in log *   Findings: widely patent anastomosis; Left sided benign tics;  2 1cm flat perianal skin lesions;   No polyps   Complications: none  Implants: * No implants in log *

## 2024-12-09 NOTE — TELEPHONE ENCOUNTER
CC: Increased tenesmus and gas    Spoke with Dharmesh:  - CD, pancolonic, perianal fistula   - IBD meds: Rinvoq 45mg/d (started 24, tapered to 30mg w increased sxs, approved for 45mg/d)  - 12/3/24 colonoscopy: Moderate to severe inflammation in the rectum/sigmoid and descending with 2 small          localized areas of erythema in the ascending colon and appendiceal orifice. Two areas of abnormal mucosa in the sigmoid and cecum to assess for dysplasia.    - Prednisone weaned off  (2024-2024)  - 10/18/24 stool ra 176, improved from 1300    - BM/d: 3, soft formed  - blood: mixed in every stool, passes blood clots approx size of a nickel  - mucous: yes  - noc BM/d: 1  - false BR trips/d: 6-7, passes gas  Pain:     - Location: LLQ    - Ratin-5/10    - Type of pain: intermittent ache    - Aggravating factors: worse after BM  - OV: 24    Dr. Jackson will be updated in Dr. Bhat's absence.

## 2024-12-10 DIAGNOSIS — K50.10 CROHN'S DISEASE OF COLON WITHOUT COMPLICATION: Primary | ICD-10-CM

## 2024-12-10 RX ORDER — DICYCLOMINE HYDROCHLORIDE 10 MG/1
10 CAPSULE ORAL
Qty: 120 CAPSULE | Refills: 0 | Status: SHIPPED | OUTPATIENT
Start: 2024-12-10 | End: 2025-01-09

## 2024-12-10 NOTE — TELEPHONE ENCOUNTER
Questions if he should refill Natalievopat    Spoke with Dharmesh:  - he has enough on hand until his appt on 12/17/25  - adv he hold off on refilling and if needed samples may be able to be provided if necessary  - he states understanding and agrees

## 2024-12-17 ENCOUNTER — OFFICE VISIT (OUTPATIENT)
Dept: GASTROENTEROLOGY | Facility: CLINIC | Age: 49
End: 2024-12-17
Payer: COMMERCIAL

## 2024-12-17 VITALS
HEART RATE: 73 BPM | HEIGHT: 74 IN | OXYGEN SATURATION: 96 % | SYSTOLIC BLOOD PRESSURE: 123 MMHG | DIASTOLIC BLOOD PRESSURE: 79 MMHG | WEIGHT: 227.5 LBS | BODY MASS INDEX: 29.2 KG/M2 | TEMPERATURE: 98 F

## 2024-12-17 DIAGNOSIS — A49.8 RECURRENT CLOSTRIDIOIDES DIFFICILE INFECTION: ICD-10-CM

## 2024-12-17 DIAGNOSIS — K50.10 CROHN'S DISEASE OF COLON WITHOUT COMPLICATION: Primary | ICD-10-CM

## 2024-12-17 DIAGNOSIS — D84.821 IMMUNODEFICIENCY DUE TO LONG TERM IMMUNOSUPPRESSIVE DRUG THERAPY: ICD-10-CM

## 2024-12-17 DIAGNOSIS — T45.1X5A IMMUNODEFICIENCY DUE TO LONG TERM IMMUNOSUPPRESSIVE DRUG THERAPY: ICD-10-CM

## 2024-12-17 DIAGNOSIS — Z79.899 IMMUNODEFICIENCY DUE TO LONG TERM IMMUNOSUPPRESSIVE DRUG THERAPY: ICD-10-CM

## 2024-12-17 PROCEDURE — G2211 COMPLEX E/M VISIT ADD ON: HCPCS | Mod: S$GLB,,, | Performed by: INTERNAL MEDICINE

## 2024-12-17 PROCEDURE — 99215 OFFICE O/P EST HI 40 MIN: CPT | Mod: S$GLB,,, | Performed by: INTERNAL MEDICINE

## 2024-12-17 RX ORDER — DIPHENHYDRAMINE HYDROCHLORIDE 50 MG/ML
50 INJECTION INTRAMUSCULAR; INTRAVENOUS ONCE AS NEEDED
OUTPATIENT
Start: 2024-12-17

## 2024-12-17 RX ORDER — PREDNISONE 10 MG/1
40 TABLET ORAL DAILY
Qty: 70 TABLET | Refills: 0 | Status: CANCELLED | OUTPATIENT
Start: 2024-12-17

## 2024-12-17 RX ORDER — HEPARIN 100 UNIT/ML
500 SYRINGE INTRAVENOUS
OUTPATIENT
Start: 2024-12-17

## 2024-12-17 RX ORDER — ACETAMINOPHEN 325 MG/1
650 TABLET ORAL
OUTPATIENT
Start: 2024-12-17

## 2024-12-17 RX ORDER — SODIUM CHLORIDE 0.9 % (FLUSH) 0.9 %
10 SYRINGE (ML) INJECTION
OUTPATIENT
Start: 2024-12-17

## 2024-12-17 RX ORDER — EPINEPHRINE 0.3 MG/.3ML
0.3 INJECTION SUBCUTANEOUS ONCE AS NEEDED
OUTPATIENT
Start: 2024-12-17

## 2024-12-17 RX ORDER — AZATHIOPRINE 50 MG/1
100 TABLET ORAL DAILY
Qty: 60 TABLET | Refills: 0 | Status: SHIPPED | OUTPATIENT
Start: 2024-12-17

## 2024-12-17 RX ORDER — CETIRIZINE HYDROCHLORIDE 10 MG/1
10 TABLET ORAL
OUTPATIENT
Start: 2024-12-17

## 2024-12-17 RX ORDER — IPRATROPIUM BROMIDE AND ALBUTEROL SULFATE 2.5; .5 MG/3ML; MG/3ML
3 SOLUTION RESPIRATORY (INHALATION)
OUTPATIENT
Start: 2024-12-17

## 2024-12-17 NOTE — PROGRESS NOTES
"       [facility]             Inflammatory Bowel Disease   Follow-up  Note              TODAY'S VISIT DATE:  12/17/2024    Chief Complaint:   Chief Complaint   Patient presents with    Crohn's Disease     PCP: Unable, To Obtain, declined referral today    Previous History:  Dharmesh Lozoya is a 49 y.o. male with Crohn's disease (pancolonic, perianal fistula), history of C diff (neg 12/2022, 5/2023), and hx of pancreatitis .  Patient was doing well until 2015 and developed blood in his stools and saw family medicine doctor in [place] and had a flex sig and diagnosed "colitis."  He then had a colonoscopy by a GI doctor in [place] and recalls being told distal colitis and started on medication that was too expensive and took for 30 days (sounds like apriso) but too expensive and not effective so discontinued. At that time he did not have insurance so he proceeded with dietary changes (more fish, turmeric).  Within a few weeks symptoms resolved and back to baseline normal BMs 4-5 variable consistency but no blood and able to go back to normal lifestyle. In 2017 he moved from [place] to [place] and in 12/2022 began with worsening bloody diarrhea, cramping and weight loss. He was diagnosed with C diff and vancomycin started which helped significant with his symptoms. His symptoms went from 10-15 BMs/d to 8-12 BMs/d after oral vancomycin.   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  He was then placed on lialda 3.6 g/d with entocort 9 mg/d (decreased to 6 mg/d 3/2023) which he took for 1 month with some response and then discontinued due to ineffectiveness. He was hospitalized 3/2023 at [facility] for 1 days due to significant " bloody diarrhea with weakness and dehydration and he had ran out of entocort a week before and was off of lialda by that time. He was discharged home on prednisone 40 mg daily for 2 weeks and advised to f/u with his GI doctor. In 3/2023 he noticed abscess which spontaneously drained and also at that time had issues with hemorrhoids He then started stelara 4/12/23. In 5/2023 pt had good response in regards to bleeding and stool frequency and at that time C diff recurrent and treated with vancomycin x 10 days.  Approximately about 6 weeks after starting IV stelara noticed initially small pruritic blisters between toes and then progressed to head, arms, legs, feet, toes. These lesions were treated symptomatically with hydroxyzine and he took athlete's foot powder and then eventually got topical cream for horses that helped (antifungal/antibacterial). Diet he found if dairy, spicy foods, red sauce exacerbated symptoms.  Colonoscopy 12/29/23 significant for inflammation with congestion, erosions, friability and mucus in a contiuous and circumferential pattern from anus to sigmoid c/w moderate inflammation (hernandez score 3)-bx c/w moderate active inflammation, CMV neg. Perianal exam at time of scope c/w palpable lump c/w fistula with drainage. He discontinued stelara 12/6/23 due to rash which resolved after discontinutaiton.  In 12/2023 pt started on oral prednisone 40 mg daily for 3 weeks and then decreased to 30 mg/d around 1/20/24 and continued on this when he was initially seen in IBD clinic 2/6/24 at which time he was having 4-5 soft to formed BMs/d. Since first week of Jan 2024 his symptoms started improving. Patient was started on humira 40 mg every 2 weeks on 3/20/2024 and he continued prednisone taper. MRI pelvis on 3/11/24 was significant for left intersphincteric perianal fistula and rectal inflammation. In late 3/2024 he was started on canasa suppositories and we continued the prednisone taper. In 6/14/24,  elected to start Rinvoq. Started prednisone taper after one week of Rinvoq as symptoms had not yet significantly improved. By 7/3, had significant improvement with only 2-3 formed stools per day without blood, pain or urgency. Tapered prednisone weekly, but within 3 days of stopping, symptoms worsened significantly and prednisone was restarted at 20 mg on 8/9 then increased to 40 mg on 8/22 for persistent symptoms - 10 BMs per day with scant blood. He initially did exceptionally well after starting Rinvoq along with prednisone taper, but unfortunately symptoms worsened significantly after stopping prednisone taper on 8/6 and persisted despite restarting pred 20 on 8/9. Increased to prednisone 40 mg on 8/22 due to persistent symptoms and elevated calprotectin (overall unchanged since starting Rinvoq). At his office visit on 8/23 he was started on a 14d course of cipro/flagyl for perianal purulence noted on exam, likely from fistula though no obvious abscess. Different treatment options were discussed, mostly continuing prolonged trial of Rinvoq 45 mg for additional 3 mo vs switch to Remicade + immunomodulator given his perianal disease. If the latter pt will need repeat scope prior.On 9/10/24, patient reported improvements since restarting prednisone and completing 2 week course of cipro/flagyl. Given the positive response, recommended patient continue rinvoq 45 mg for an additional 3 months for extended induction while prednisone tapered.  I also recommended patient repeat stool calprotectin and continue to monitor symptoms and labs closely until his next office visit. If pt did not tolerate prednisone taper well while on extended induction dose of rinvoq 45mg, planned to consider colonoscopy then possible treatment change to infliximab with immunomodulator combination due to perianal disease.     Interval History:  - current IBD meds: Rinvoq 45 mg po daily (started 6/14/24),   - recent meds; Prednisone  (24-24, 20 mg/d , 40 mg/d , 35mg/d , 30 mg/ QD , 25 mg/d , 20 mg/ QD , 15 mg/d 10/3, 20 mg/d 10/8, 15mg/d 10/21, 10mg/d 10/28, 5mg/d on , stopped )   - before colonoscopy: 2-6 liquid to formed BM a day; blood only with first BM of the day; occasional mucus; no abd pain   - after colonoscopy: 7-10 liquid (60-70%) BM a day (half are false trips with gas, blood or mucus) ; blood with every BM; mucus mainly in the AM; left sided abdominal pain 6/10 - bentyl helps; high fiber foods make it worse; some urgency but can control   - stool calprotectin: 24 1420,  24 1370, 10/18/24 176  - 12/3/24: colonoscopy: Hemorrhoids found on perianal exam. Preparation of the colon was fair. Moderate to severe inflammation in the rectum/sigmoid and descending with 2 small localized areas of erythema in the ascending colon and appendiceal orifice. Biopsied.Two areas of abnormal mucosa in the sigmoid and cecum to assess for dysplasia. Biopsies confirmed active inflammation.   - feels that fistula has closed; denies any discharge or issues.  - reports blisters/rash in his upper body and upper thighs. Started when he started prednisone and resolved after he stopped prednisone. Currently inactive   - eye blisters inside the eyelids have now resolved   - Tobacco cessation started 24; utilizing nicotine patches - decreased from -10 to now 6 a day (7mg)  - NSAID use: No  - Narcotic use: No  - Alternative/complementary meds for IBD:   No    Prior Pertinent Surgeries:   None    Last pertinent Endoscopy/Imagin23 colonoscopy:  inflammation with congestion, erosions, friability ad mucus in a contiuous and circumferential pattern from anus to sigmoid c/w moderate inflammation (hernandez score 3). Scope not complete due to degree of inflammation. Biopsies showed rectum and sigmoid with moderately active inflammation with ulceration, CMV negative. On colonoscopy there was fistula with palpable  "lump "peanut" size with drainage.  3/11/24 MRI pelvis: Left intersphincteric perianal fistula at the left lateral approximate 4 o'clock position, measuring approximately 3.8 cm in length.  One suspected point of exit about the left gluteal fold.  Additional suspected blind-ending component about the left gluteal soft tissues.  Suspected granulation tissue about the visualized perianal fistula.  Diffuse rectal mucosal hyperenhancement and wall thickening, as well as inflammatory change about the perirectal fat and multiple prominent perirectal lymph nodes.Prostatomegaly.  Possible right-sided varicocele.    12/3/24 Colonoscopy: Hemorrhoids found on perianal exam. Preparation of the colon was fair. Moderate to severe inflammation in the rectum/sigmoid and descending with 2 small localized areas of erythem in the ascending colon and appendiceal orifice. Biopsied.Two areas of abnormal mucosa in the sigmoid and cecum to assess for dysplasia. Biopsies confirmed active inflammation.     Therapeutic Drug Monitoring Labs:  5/29/24 ADM trough level <0.6 with Ab 432 (on humira 40mg Q2W)    Prior IBD Therapies:  Oral mesalamine (unclear which one but seems like apriso)- ineffective but only took for 4 weeks  lialda 3.6 g/d   Entocort 9 mg/d- ineffective  Prednisone - effective (losses response in doses 15 mg or less)  Methotrexate - d/c due to elevated ALT  Stelara 90mg SC every 8 weeks (4/12/23 - 12-6/23) - d/c due to rash which resolved after stopping the medication  Humira (3/20/24 - 5/29/24) - d/c due to undetectable levels and high antibodies     Vaccinations:  Lab Results   Component Value Date    HEPBSURFABQU Negative 02/06/2024    HEPBSURFABQU <3 02/06/2024     Lab Results   Component Value Date    HEPAIGG Non-reactive 02/06/2024     Lab Results   Component Value Date    VARICELLAZOS 1030.00 02/06/2024    VARICELLAINT Positive 02/06/2024     Lab Results   Component Value Date    MUMPSIGGSCRE <5.00 02/06/2024    " MUMPSIGGINTE Negative 02/06/2024      Lab Results   Component Value Date    RUBEOLAIGGAN 64.20 02/06/2024    RUBEOLAINTER Positive 02/06/2024     Immunization History   Administered Date(s) Administered    Pneumococcal Conjugate - 20 Valent 06/14/2024    Zoster Recombinant 06/14/2024, 12/03/2024   Flu shot: recommended yearly,   COVID vaccine/booster:  per CDC recommendations  RSV: after age 49 yo if high risk  Tetanus (Tdap):  last tetanus was over 10 years ago.   HPV: NA     Meningococcal: NA  Hepatitis B: defer to future visit   Hepatitis A: defer to future visit   MMR (live vaccine): not immune to mumps, immune to rubella        Review of Systems   Constitutional:  Negative for chills, fever and weight loss.   HENT:          No oral ulcers, dysphagia, oral thrush   Eyes:  Negative for blurred vision, pain and redness.   Respiratory:  Negative for cough and shortness of breath.    Cardiovascular:  Negative for chest pain.   Gastrointestinal:  Negative for abdominal pain, heartburn, nausea and vomiting.   Genitourinary:  Negative for dysuria and hematuria.   Musculoskeletal:  Negative for back pain and joint pain.   Skin:  Negative for rash.   Psychiatric/Behavioral:  Negative for depression. The patient is not nervous/anxious and does not have insomnia.      All Medical History/Surgical History/Family History/Social History/Allergies have been reviewed and updated in EMR    Outpatient Medications Marked as Taking for the 12/17/24 encounter (Office Visit) with Tianna Bhat MD   Medication Sig Dispense Refill    CALCIUM-MAGNESIUM-ZINC ORAL Take by mouth. Dosing unknown      cholecalciferol, vitamin D3, (VITAMIN D3) 50 mcg (2,000 unit) Cap capsule Take 2,000 Units by mouth once a week.      dicyclomine (BENTYL) 10 MG capsule Take 1 capsule (10 mg total) by mouth 4 (four) times daily before meals and nightly. 120 capsule 0    [DISCONTINUED] upadacitinib (RINVOQ) 45 mg Tb24 Take 45 mg by mouth once daily. 28  "tablet 2     Vital Signs:  /79 (BP Location: Right arm, Patient Position: Sitting)   Pulse 73   Temp 98.4 °F (36.9 °C) (Temporal)   Ht 6' 2" (1.88 m)   Wt 103.2 kg (227 lb 8.2 oz)   SpO2 96%   BMI 29.21 kg/m²    Physical Exam  Abdominal:      General: Bowel sounds are normal. There is no distension.      Palpations: Abdomen is soft.      Tenderness: There is no abdominal tenderness.   Genitourinary:     Comments: No visible perianal abscess or anal fissure. Purulent drainage noted in perianal area coming from fistula.    Labs:   Lab Results   Component Value Date    CRP 10.4 (H) 12/17/2024    CALPROTECTIN 1,420.0 (H) 06/14/2024     Lab Results   Component Value Date    HEPBSAG Non-reactive 12/17/2024    HEPBCAB Non-reactive 12/17/2024     Lab Results   Component Value Date    TBGOLDPLUS Negative 02/06/2024     Lab Results   Component Value Date    QHYWLNRK70RM 23 (L) 02/06/2024    TGQPAHND85 348 02/06/2024     Lab Results   Component Value Date    WBC 9.54 12/17/2024    HGB 12.3 (L) 12/17/2024    HCT 41.4 12/17/2024    MCV 88 12/17/2024     (H) 12/17/2024     Lab Results   Component Value Date    CREATININE 1.2 12/17/2024    ALBUMIN 3.6 12/17/2024    BILITOT 0.3 12/17/2024    ALKPHOS 66 12/17/2024    AST 21 12/17/2024    ALT 17 12/17/2024     Assessment/Plan:  Dharmesh Lozoya is a 49 y.o. male with Crohn's disease (pan-colonic, perianal fistula), recurrent C diff (12/2022, 5/2023), elevated LFTs (ALT).     Despite good adherence to rinvoq 45mg daily for the last 6 months, recent colonoscopy showed active inflammation in the rectum, sigmoid and descending colon with 2 small localized areas of erythema in the ascending colon and appendiceal orifice, confirmed on biopsies. Patient symptoms have been unstable after completing prednisone taper and have further worsened since the scope. Will check stool c diff today to rule out infection. If c diff negative recommended restarting prednisone 40mg daily to " bridge while we get new treatment approved. Discussed different treatment options since rinvoq ineffective. Reviewed in detail infliximab, risankizumab and vedolizumab, which patient is naive to. Due to his history of perianal disease we decided to proceed with infliximab. Since patient has developed antibodies to humira in the past recommended combining infliximab with azathioprine to decrease risk of immunogenicity. Will start authorization for infliximab with plans for very close monitoring. Will check a drug level at week 2 to rule out anti-drug antibodies. Will plan for a repeat drug level at week 6 to assess if dose optimization is needed.      # Crohn's disease (pan-colonic, perianal fistula):    - stop rinvoq   - will start authorization for infliximab 5 mg/kg IV on week 0, 2, 6 then Q8W. Briefly discussed [place] injections, can consider in the future when pt stable   - start azathioprine 100mg PO daily same time as your first infusion  - stool c diff today to r/o infection  - if c diff neg will start prednisone 60mg daily. RN to check with pt weekly for taper   - colonoscopy TBD depending on clinical course  Prep: Miralax + Gatorade  - drug monitoring labs: CBC/CMP at week 2, 6, 14 then every 2 mo (1/2025) TPMT (normal 2/2024), TB quantiferon (today, 12/2025), Hep B testing (today, 12/2025)  - TDM: IFX levels at week 2, then repeat at week 6    # Prostate- MRI pelvis showed prostatomegaly and possible right sided varicocele  - will consider urology referral at later date     # Immunodeficiency due to long term immunosuppressive drug therapy and IBD specific health maintenance:  CRC risk- sx 2015, pancolonic, surveillance colonoscopy q 1-2 years.  Skin exam- yearly, recommended to schedule now  Risk for osteopenia/osteoporosis- long term use of prednisone, taking calcium/vit D  Vitamin D deficiency- previously recommend vit D3 5000- 2 pills daily for 3 mos and then 1 pill daily. Patient misunderstood dosing  instructions and has been taking Vit D3 2,000 IU weekly for 2 months.  Reminded patient of recommended dosing.  Vaccines- no live vaccines    Total time: 45 min    Visit today is associated with current or anticipated ongoing medical care related to this patient's single serious condition/complex condition Crohn's disease.     I personally examined the patient and discussed above plan in collaboration with Kristen LeaD.      Follow up in about 6 weeks (around 1/28/2025). RN weekly phone check in.     Tianna Bhat MD   Department of Gastroenterology  Medical Director, Inflammatory Bowel Disease

## 2024-12-17 NOTE — PATIENT INSTRUCTIONS
- turn in stool sample (stool calprotectin and c. Diff)  - labs today   - will start authorization for infliximab   - start prednisone 40mg daily - RN will check in weekly   - will start imuran 100mg daily   - meet with colorectal surgeons to discuss options   - will check labs at week 2 and 6         Infliximab (Remicade, Inflectra, Avsola, Renflexis)    Class: TNF alpha Blocker - Biologic product    Mechanism of Action: blocks TNF alpha which plays a role in the inflammatory process for Inflammatory Bowel Disease (IBD).    Other indications: Rheumatoid Arthritis, Psoriatic Arthritis, Ankylosing Spondylitis, Plaque Psoriasis.    Dosage/ Route/ Frequency: Intravenous (IV) Infusion that will be performed at an infusion center   - Loading Dose: 5 mg/kg IV week 0, 2, 6 (infused over 2 hours)  - Maintenance Dose: 5 mg/kg mg IV every 8 weeks  - 2-3-hour infusion    Side effects    Stop therapy due to adverse event= 10% (10/100)  Please notify us if you are treated with antibiotics or experience signs of infection (ie. fevers) given we may need to hold your medication during this time.     Common side effects (>10% - 1 in 10 people): upper respiratory infection, sinus infection, infusion related reaction, headache, abdominal pain.    Serious side effects:     Please call us immediately if you develop any of the below problems:    Allergic reactions  - <2% (2/100) develop injection site reactions  - Hypersensitivity reaction- hives (rash), difficulty breathing, chest pain/tightness, high or low blood pressure, swelling of the face and hands, fever or chills    Serious Infections 3% (3/100): viral/bacterial and fungal infections. Symptoms you might experience include fever, tiredness, flu, open sores, warm red painful skin    Blood Disorders: fever that doesn't go away, bruising, bleeding, severe paleness    Malignancies:  Non-Hodgkin's Lymphoma 0.06% (6/10,000)  Non-Melanoma skin cancer This medication may increase your  risk of skin cancer, yearly skin checks are recommended. Make sure you are using sun block and protective wear.    Drug related lupus-like reaction 1% (1/100): chest discomfort or pain that does not go away, shortness of breath, joint pain, rash on the cheeks or arms that gets worse in the sun    Psoriasis: new or worsening red scaly patches or raised bumps on the skin that are filled with pus     Case Reports Only: Multiple Sclerosis and Guillain [place] Syndrome  Numbness/weakness/tingling of your hands and feet, changes in your vision or seizures    Case Reports Only: New or worsening Congestive Heart Failure  shortness of breath, swelling in your ankles or feet, sudden weight gain    Case Reports Only: Serious Liver Injury  jaundice (yellow skin or eyes), dark brown urine, right-sided abdominal pain, fever, severe itchiness    Labs/Monitoring:  Prior to starting this new agent, we will be checking labs to determine the safety of taking this medication including baseline blood counts, liver and kidney function tests, TB test and viral hepatitis testing.   Once started on the therapy we will monitor your blood count and your liver and kidney function tests as needed following evidence-based guidelines. TB test and viral hepatitis tests will be repeated every year. If you have any risk of exposure or travel to high-risk areas please notify us so we can do this testing sooner. If indicated your provider may also choose to check drug levels to monitor or optimize your response to the medication.     Vaccine counseling:   Avoid live vaccines which include:  Intranasal Influenza LAIV4 (FluMist Quadrivalent)  Measles, Mumps, Rubella (MMR)  Rotavirus (RotaTeq, Rotarix)  Typhoid oral capsule (Vivotif)  Varicella (Varivax)  Smallpox (Vaccinia)  Yellow Fever (YF-Vax)  Adenovirus  Cholera (Vaxchora)        Azathioprine (Imuran)    Class: Immunomodulator     MOA: it can weaken or modify the activity of the immune system which  decreases the gut inflammation.    Dosage/ Route/ Frequency: Oral tablet taken by mouth  Your dose will be based on initial labs and your weight.  Comes in 50mg tablets.     Side effects  Please notify us if you are treated with antibiotics or experience signs of infection (ie. fevers) given we may need to hold your medication during this time.  Stop therapy due to adverse event = 11% (11 in 100 people)    Common side effects (>10% = 1 in 10 people): nausea, vomiting, infections.     Serious side effects:   Please call us immediately if you develop any of the below problems     Allergic reaction (2% or 1 in 50 people): drug fever, arthritis, rash    Pancreatitis (3% or 3 in 100): typical symptoms- abdominal pain, nausea/vomiting    Liver injury (2% or 2 in 100): This medication can increase your liver enzymes and is usually reversible with stopping the medication, your labs will be monitored regularly. Let your provider know immediately if you develop yellowing of the skin and eyes (jaundice), nausea, vomiting, unusual darkening of the urine, feeling tired or weak.    Serious infections (5% or 1 in 20). Important to take hand hygiene precautions and get the recommended vaccinations.     Blood abnormalities: such as anemia or reduction in white blood cell count (leukopenia).    Malignancies:  Non-Hodgkin's Lymphoma (0.04% or 4 in 10,000 people)  Non-melanoma skin cancer (basal cell and squamous cell cancer) (0.16% or 16 in 10,000) Make sure you are using sun block, protective wear, and get your skin checked once a year.    Labs/Monitoring:  Prior to starting this new agent, we will be checking labs to determine the safety of taking this medication including baseline blood counts, liver and kidney function tests, enzyme activity test (TPMT).  Once started on the therapy we will repeat blood count, liver and kidney test after 2 weeks, 1 month, 2 months, and every 3 months thereafter. If indicated your provider may also  choose to check drug levels to monitor or optimize your response to the medication.    Vaccine counseling:   Avoid live vaccines which include:  Intranasal Influenza LAIV4 (FluMist Quadrivalent)  Measles, Mumps, Rubella (MMR)  Rotavirus (RotaTeq, Rotarix)  Typhoid oral capsule (Vivotif)  Varicella (Varivax)  Smallpox (Vaccinia)  Yellow Fever (YF-Vax)  Adenovirus  Cholera (Vaxchora)    Avoid consumption of raw seafood such as oysters/sushi.      Avoid consumption of raw seafood such as oysters/sushi.

## 2024-12-18 ENCOUNTER — TELEPHONE (OUTPATIENT)
Dept: GASTROENTEROLOGY | Facility: CLINIC | Age: 49
End: 2024-12-18
Payer: COMMERCIAL

## 2024-12-18 DIAGNOSIS — K50.10 CROHN'S DISEASE OF COLON WITHOUT COMPLICATION: Primary | ICD-10-CM

## 2024-12-18 RX ORDER — PREDNISONE 10 MG/1
60 TABLET ORAL DAILY
Qty: 183 TABLET | Refills: 0 | Status: SHIPPED | OUTPATIENT
Start: 2024-12-18

## 2024-12-18 NOTE — TELEPHONE ENCOUNTER
----- Message from Tianna Bhat MD sent at 12/18/2024  8:21 AM CST -----  Let patient know that C diff is negative. Await stool cultures but let him know since he is not well I am going to start prednisone now- please send in prescription for pred 60 mg daily, okay to check on patient Monday, 12/30 regarding symptoms and whether we can taper prednisone.  Put pt on pred list    SS  ----- Message -----  From: [facility]  Sent: 12/17/2024   9:23 PM CST  To: Tianna Bhat MD  
Called & spoke to pt  - Reviewed stool C. Diff negative, stool cultures in process  - Plan to start prednisone 60 mg/ QD now w/ taper instructions TBD 12/30/24  - Pt expressed understanding   
No

## 2024-12-19 ENCOUNTER — PATIENT MESSAGE (OUTPATIENT)
Dept: GASTROENTEROLOGY | Facility: CLINIC | Age: 49
End: 2024-12-19
Payer: COMMERCIAL

## 2024-12-20 ENCOUNTER — TELEPHONE (OUTPATIENT)
Dept: GASTROENTEROLOGY | Facility: CLINIC | Age: 49
End: 2024-12-20
Payer: COMMERCIAL

## 2024-12-20 ENCOUNTER — PATIENT MESSAGE (OUTPATIENT)
Dept: INFUSION THERAPY | Facility: HOSPITAL | Age: 49
End: 2024-12-20
Payer: COMMERCIAL

## 2024-12-20 NOTE — TELEPHONE ENCOUNTER
----- Message from Tianna Bhat MD sent at 12/20/2024 12:21 PM CST -----  Let pt know stool calprotectin high and C diff neg.   Stool cultures were not ordered by us.  If not better in a week on pred 60 mg daily then please have him turn in stool cultures at that time since not done

## 2024-12-24 ENCOUNTER — TELEPHONE (OUTPATIENT)
Dept: ENDOSCOPY | Facility: HOSPITAL | Age: 49
End: 2024-12-24
Payer: COMMERCIAL

## 2024-12-24 ENCOUNTER — PATIENT MESSAGE (OUTPATIENT)
Dept: ENDOSCOPY | Facility: HOSPITAL | Age: 49
End: 2024-12-24
Payer: COMMERCIAL

## 2024-12-24 ENCOUNTER — PATIENT MESSAGE (OUTPATIENT)
Dept: GASTROENTEROLOGY | Facility: CLINIC | Age: 49
End: 2024-12-24
Payer: COMMERCIAL

## 2024-12-24 DIAGNOSIS — K50.10 CROHN'S DISEASE OF COLON WITHOUT COMPLICATION: Primary | ICD-10-CM

## 2024-12-24 DIAGNOSIS — K50.918 CROHN'S DISEASE WITH OTHER COMPLICATION, UNSPECIFIED GASTROINTESTINAL TRACT LOCATION: Primary | ICD-10-CM

## 2024-12-24 NOTE — TELEPHONE ENCOUNTER
"From: Melissa Terrazas MA   Sent: 2024   3:56 PM CST   To: [facility] Endoscopy Schedulers         ----- Message -----   From: Susana Tineo   Sent: 2024  10:47 AM CST   To: Melissa Terrazas MA       ----- Message -----   From: Jolynn Chicas RN   Sent: 2024  12:00 AM CST   To: Melissa Terrazas MA; *     Procedure: Colonoscopy     Diagnosis: Crohn's disease     Procedure Timin-12 weeks; 2025     *If within 4 weeks selected, please estuardo as high priority*     *If greater than 12 weeks, please select "5-12 weeks" and delay sending until 2 months prior to requested date*     Provider: MATIAS Bhat     Location: Any Site     Additional Scheduling Information: No scheduling concerns     Prep Specifications:Standard prep     Is the patient taking a GLP-1 Agonist:no     Have you attached a patient to this message: yes     Referral for procedure from [facility]      Spoke to pt to schedule procedure(s) Colonoscopy       Physician to perform procedure(s) Dr. MINH Bhat  Date of Procedure (s) 25  Arrival Time 12:30 PM  Time of Procedure(s) 1:30 PM   Location of Procedure(s) [address]  Type of Rx Prep sent to patient: Miralax  Instructions provided to patient via [facility]    Patient was informed on the following information and verbalized understanding. Screening questionnaire reviewed with patient and complete. If procedure requires anesthesia, a responsible adult needs to be present to accompany the patient home, patient cannot drive after receiving anesthesia. Appointment details are tentative, especially check-in time. Patient will receive a prep-op call 7 days prior to confirm check-in time for procedure. If applicable the patient should contact their pharmacy to verify Rx for procedure prep is ready for pick-up. Patient was advised to call the scheduling department at [phone] if pharmacy states no Rx is available. Patient was advised to call the endoscopy " scheduling department if any questions or concerns arise.      [facility] Endoscopy Scheduling Department

## 2024-12-26 ENCOUNTER — TELEPHONE (OUTPATIENT)
Dept: GASTROENTEROLOGY | Facility: CLINIC | Age: 49
End: 2024-12-26
Payer: COMMERCIAL

## 2024-12-26 ENCOUNTER — INFUSION (OUTPATIENT)
Dept: INFUSION THERAPY | Facility: HOSPITAL | Age: 49
End: 2024-12-26
Attending: INTERNAL MEDICINE
Payer: COMMERCIAL

## 2024-12-26 VITALS
WEIGHT: 231.5 LBS | OXYGEN SATURATION: 98 % | TEMPERATURE: 98 F | DIASTOLIC BLOOD PRESSURE: 77 MMHG | HEIGHT: 74 IN | RESPIRATION RATE: 14 BRPM | HEART RATE: 80 BPM | SYSTOLIC BLOOD PRESSURE: 135 MMHG | BODY MASS INDEX: 29.71 KG/M2

## 2024-12-26 DIAGNOSIS — K50.10 CROHN'S DISEASE OF COLON WITHOUT COMPLICATION: Primary | ICD-10-CM

## 2024-12-26 PROCEDURE — 63600175 PHARM REV CODE 636 W HCPCS: Mod: JZ,JG,PN | Performed by: INTERNAL MEDICINE

## 2024-12-26 PROCEDURE — 96365 THER/PROPH/DIAG IV INF INIT: CPT | Mod: PN

## 2024-12-26 PROCEDURE — 96366 THER/PROPH/DIAG IV INF ADDON: CPT | Mod: PN

## 2024-12-26 PROCEDURE — 25000003 PHARM REV CODE 250: Mod: PN | Performed by: INTERNAL MEDICINE

## 2024-12-26 RX ORDER — HEPARIN 100 UNIT/ML
500 SYRINGE INTRAVENOUS
OUTPATIENT
Start: 2025-02-20

## 2024-12-26 RX ORDER — CETIRIZINE HYDROCHLORIDE 10 MG/1
10 TABLET ORAL
Status: COMPLETED | OUTPATIENT
Start: 2024-12-26 | End: 2024-12-26

## 2024-12-26 RX ORDER — SODIUM CHLORIDE 0.9 % (FLUSH) 0.9 %
10 SYRINGE (ML) INJECTION
Status: DISCONTINUED | OUTPATIENT
Start: 2024-12-26 | End: 2024-12-26 | Stop reason: HOSPADM

## 2024-12-26 RX ORDER — DIPHENHYDRAMINE HYDROCHLORIDE 50 MG/ML
50 INJECTION INTRAMUSCULAR; INTRAVENOUS ONCE AS NEEDED
Status: DISCONTINUED | OUTPATIENT
Start: 2024-12-26 | End: 2024-12-26 | Stop reason: HOSPADM

## 2024-12-26 RX ORDER — HEPARIN 100 UNIT/ML
500 SYRINGE INTRAVENOUS
Status: DISCONTINUED | OUTPATIENT
Start: 2024-12-26 | End: 2024-12-26 | Stop reason: HOSPADM

## 2024-12-26 RX ORDER — CETIRIZINE HYDROCHLORIDE 10 MG/1
10 TABLET ORAL
OUTPATIENT
Start: 2025-02-20

## 2024-12-26 RX ORDER — ACETAMINOPHEN 325 MG/1
650 TABLET ORAL
OUTPATIENT
Start: 2025-02-20

## 2024-12-26 RX ORDER — IPRATROPIUM BROMIDE AND ALBUTEROL SULFATE 2.5; .5 MG/3ML; MG/3ML
3 SOLUTION RESPIRATORY (INHALATION)
OUTPATIENT
Start: 2025-02-20

## 2024-12-26 RX ORDER — ACETAMINOPHEN 325 MG/1
650 TABLET ORAL
Status: COMPLETED | OUTPATIENT
Start: 2024-12-26 | End: 2024-12-26

## 2024-12-26 RX ORDER — DIPHENHYDRAMINE HYDROCHLORIDE 50 MG/ML
50 INJECTION INTRAMUSCULAR; INTRAVENOUS ONCE AS NEEDED
OUTPATIENT
Start: 2025-02-20

## 2024-12-26 RX ORDER — EPINEPHRINE 0.3 MG/.3ML
0.3 INJECTION SUBCUTANEOUS ONCE AS NEEDED
Status: DISCONTINUED | OUTPATIENT
Start: 2024-12-26 | End: 2024-12-26 | Stop reason: HOSPADM

## 2024-12-26 RX ORDER — SODIUM CHLORIDE 0.9 % (FLUSH) 0.9 %
10 SYRINGE (ML) INJECTION
OUTPATIENT
Start: 2025-02-20

## 2024-12-26 RX ORDER — EPINEPHRINE 0.3 MG/.3ML
0.3 INJECTION SUBCUTANEOUS ONCE AS NEEDED
OUTPATIENT
Start: 2025-02-20

## 2024-12-26 RX ADMIN — SODIUM CHLORIDE: 9 INJECTION, SOLUTION INTRAVENOUS at 08:12

## 2024-12-26 RX ADMIN — CETIRIZINE HYDROCHLORIDE 10 MG: 10 TABLET, FILM COATED ORAL at 09:12

## 2024-12-26 RX ADMIN — SODIUM CHLORIDE 500 MG: 9 INJECTION, SOLUTION INTRAVENOUS at 09:12

## 2024-12-26 RX ADMIN — ACETAMINOPHEN 650 MG: 325 TABLET ORAL at 09:12

## 2024-12-26 NOTE — PLAN OF CARE
Problem: Adult Inpatient Plan of Care  Goal: Patient-Specific Goal (Individualized)  Outcome: Progressing  Flowsheets (Taken 12/26/2024 0920)  Individualized Care Needs: Recliner, wife at chairside, pillows, warm  blankets, coffee, education, conversation  Anxieties, Fears or Concerns: First day of Inflectra  Patient/Family-Specific Goals (Include Timeframe): Free of S/S of reaction with new treatment.     Patient to Infusion for first day of Inflecta, accompanied by his wife. Treatment plan reviewed with patient. VSS. Tolerated infusion.  Provided with copy of upcoming appointment schedule. Escorted to the front lobby in no distress for discharge to home.

## 2024-12-26 NOTE — TELEPHONE ENCOUNTER
- CC: prednisone update  - CD (pan-colonic w/ perianal fistula)  - Current IBD meds: Inflectra 5 mg/kg q 8 weeks (started 12/26/24), Imuran 100 mg/ QD (started 12/26/24), & prednisone 60 mg/ QD (started 12/18/24)  - 12/3/24 colonoscopy: moderate/severe inflammation of rectum/ sigmoid  - 12/17/24 calprotectin: >8,000 (10/18/24: 176), CRP 10.4    - Overall feels better since starting prednisone  - Reports 3-5 liquid BM/ QD w/ soft chunks (12/17/24 reported 7-10 liquid BM/ QD w/ 50% false trips, blood, pain, & urgency)  - Denies any additional symptoms  - Will update Dr. Jackson

## 2024-12-26 NOTE — TELEPHONE ENCOUNTER
----- Message from STEPHEN Neil sent at 12/20/2024  2:07 PM CST -----  Per Dr. Bhat 12/20, check pt 1 wk post 60mg/d Pred. If not improved, order stool cultures.  ----- Message -----  From: Florinda Aleman RN  Sent: 12/30/2024  12:00 AM CST  To: [facility] Staff    Prednisone Taper    -IFX auth in process & will begin Imuran 100 mg/ QD w/ 1st infusion    - prednisone 60 mg/ QD started 12/18/24, symptom update 12/30/24 per Dr. Bhat

## 2025-01-03 ENCOUNTER — PATIENT MESSAGE (OUTPATIENT)
Dept: GASTROENTEROLOGY | Facility: CLINIC | Age: 50
End: 2025-01-03
Payer: COMMERCIAL

## 2025-01-09 ENCOUNTER — LAB VISIT (OUTPATIENT)
Dept: LAB | Facility: HOSPITAL | Age: 50
End: 2025-01-09
Attending: INTERNAL MEDICINE
Payer: COMMERCIAL

## 2025-01-09 ENCOUNTER — INFUSION (OUTPATIENT)
Dept: INFUSION THERAPY | Facility: HOSPITAL | Age: 50
End: 2025-01-09
Attending: INTERNAL MEDICINE
Payer: COMMERCIAL

## 2025-01-09 ENCOUNTER — PATIENT MESSAGE (OUTPATIENT)
Dept: GASTROENTEROLOGY | Facility: CLINIC | Age: 50
End: 2025-01-09
Payer: COMMERCIAL

## 2025-01-09 VITALS
TEMPERATURE: 98 F | OXYGEN SATURATION: 99 % | DIASTOLIC BLOOD PRESSURE: 80 MMHG | WEIGHT: 231.5 LBS | SYSTOLIC BLOOD PRESSURE: 122 MMHG | HEART RATE: 77 BPM | BODY MASS INDEX: 29.71 KG/M2 | HEIGHT: 74 IN | RESPIRATION RATE: 18 BRPM

## 2025-01-09 DIAGNOSIS — K50.10 CROHN'S DISEASE OF COLON WITHOUT COMPLICATION: Primary | ICD-10-CM

## 2025-01-09 DIAGNOSIS — K50.10 CROHN'S DISEASE OF COLON WITHOUT COMPLICATION: ICD-10-CM

## 2025-01-09 LAB
ALBUMIN SERPL BCP-MCNC: 3.4 G/DL (ref 3.5–5.2)
ALP SERPL-CCNC: 59 U/L (ref 40–150)
ALT SERPL W/O P-5'-P-CCNC: 20 U/L (ref 10–44)
ANION GAP SERPL CALC-SCNC: 9 MMOL/L (ref 8–16)
AST SERPL-CCNC: 13 U/L (ref 10–40)
BASOPHILS # BLD AUTO: 0.04 K/UL (ref 0–0.2)
BASOPHILS NFR BLD: 0.3 % (ref 0–1.9)
BILIRUB SERPL-MCNC: 0.4 MG/DL (ref 0.1–1)
BUN SERPL-MCNC: 19 MG/DL (ref 6–20)
CALCIUM SERPL-MCNC: 8.4 MG/DL (ref 8.7–10.5)
CHLORIDE SERPL-SCNC: 108 MMOL/L (ref 95–110)
CO2 SERPL-SCNC: 25 MMOL/L (ref 23–29)
CREAT SERPL-MCNC: 1.3 MG/DL (ref 0.5–1.4)
DIFFERENTIAL METHOD BLD: ABNORMAL
EOSINOPHIL # BLD AUTO: 0 K/UL (ref 0–0.5)
EOSINOPHIL NFR BLD: 0.1 % (ref 0–8)
ERYTHROCYTE [DISTWIDTH] IN BLOOD BY AUTOMATED COUNT: 16.4 % (ref 11.5–14.5)
EST. GFR  (NO RACE VARIABLE): >60 ML/MIN/1.73 M^2
GLUCOSE SERPL-MCNC: 248 MG/DL (ref 70–110)
HCT VFR BLD AUTO: 38.5 % (ref 40–54)
HGB BLD-MCNC: 12.2 G/DL (ref 14–18)
IMM GRANULOCYTES # BLD AUTO: 0.13 K/UL (ref 0–0.04)
IMM GRANULOCYTES NFR BLD AUTO: 0.8 % (ref 0–0.5)
LYMPHOCYTES # BLD AUTO: 0.8 K/UL (ref 1–4.8)
LYMPHOCYTES NFR BLD: 5.2 % (ref 18–48)
MCH RBC QN AUTO: 26.4 PG (ref 27–31)
MCHC RBC AUTO-ENTMCNC: 31.7 G/DL (ref 32–36)
MCV RBC AUTO: 83 FL (ref 82–98)
MONOCYTES # BLD AUTO: 0.8 K/UL (ref 0.3–1)
MONOCYTES NFR BLD: 4.7 % (ref 4–15)
NEUTROPHILS # BLD AUTO: 14.1 K/UL (ref 1.8–7.7)
NEUTROPHILS NFR BLD: 88.9 % (ref 38–73)
NRBC BLD-RTO: 0 /100 WBC
PLATELET # BLD AUTO: 313 K/UL (ref 150–450)
PMV BLD AUTO: 11.7 FL (ref 9.2–12.9)
POTASSIUM SERPL-SCNC: 4.2 MMOL/L (ref 3.5–5.1)
PROT SERPL-MCNC: 6.7 G/DL (ref 6–8.4)
RBC # BLD AUTO: 4.62 M/UL (ref 4.6–6.2)
SODIUM SERPL-SCNC: 142 MMOL/L (ref 136–145)
WBC # BLD AUTO: 15.82 K/UL (ref 3.9–12.7)

## 2025-01-09 PROCEDURE — 25000003 PHARM REV CODE 250: Mod: PN | Performed by: INTERNAL MEDICINE

## 2025-01-09 PROCEDURE — 36415 COLL VENOUS BLD VENIPUNCTURE: CPT | Mod: PN | Performed by: INTERNAL MEDICINE

## 2025-01-09 PROCEDURE — 85025 COMPLETE CBC W/AUTO DIFF WBC: CPT | Mod: PN | Performed by: INTERNAL MEDICINE

## 2025-01-09 PROCEDURE — 82397 CHEMILUMINESCENT ASSAY: CPT | Performed by: INTERNAL MEDICINE

## 2025-01-09 PROCEDURE — 80053 COMPREHEN METABOLIC PANEL: CPT | Mod: PN | Performed by: INTERNAL MEDICINE

## 2025-01-09 PROCEDURE — 80230 DRUG ASSAY INFLIXIMAB: CPT | Performed by: INTERNAL MEDICINE

## 2025-01-09 PROCEDURE — 96415 CHEMO IV INFUSION ADDL HR: CPT | Mod: PN

## 2025-01-09 PROCEDURE — 96413 CHEMO IV INFUSION 1 HR: CPT | Mod: PN

## 2025-01-09 RX ORDER — EPINEPHRINE 0.3 MG/.3ML
0.3 INJECTION SUBCUTANEOUS ONCE AS NEEDED
Status: DISCONTINUED | OUTPATIENT
Start: 2025-01-09 | End: 2025-01-09 | Stop reason: HOSPADM

## 2025-01-09 RX ORDER — HEPARIN 100 UNIT/ML
500 SYRINGE INTRAVENOUS
OUTPATIENT
Start: 2025-02-20

## 2025-01-09 RX ORDER — DIPHENHYDRAMINE HYDROCHLORIDE 50 MG/ML
50 INJECTION INTRAMUSCULAR; INTRAVENOUS ONCE AS NEEDED
Status: DISCONTINUED | OUTPATIENT
Start: 2025-01-09 | End: 2025-01-09 | Stop reason: HOSPADM

## 2025-01-09 RX ORDER — IPRATROPIUM BROMIDE AND ALBUTEROL SULFATE 2.5; .5 MG/3ML; MG/3ML
3 SOLUTION RESPIRATORY (INHALATION)
OUTPATIENT
Start: 2025-02-20

## 2025-01-09 RX ORDER — EPINEPHRINE 0.3 MG/.3ML
0.3 INJECTION SUBCUTANEOUS ONCE AS NEEDED
OUTPATIENT
Start: 2025-02-20

## 2025-01-09 RX ORDER — SODIUM CHLORIDE 0.9 % (FLUSH) 0.9 %
10 SYRINGE (ML) INJECTION
Status: DISCONTINUED | OUTPATIENT
Start: 2025-01-09 | End: 2025-01-09 | Stop reason: HOSPADM

## 2025-01-09 RX ORDER — ACETAMINOPHEN 325 MG/1
650 TABLET ORAL
OUTPATIENT
Start: 2025-02-20

## 2025-01-09 RX ORDER — ACETAMINOPHEN 325 MG/1
650 TABLET ORAL
Status: COMPLETED | OUTPATIENT
Start: 2025-01-09 | End: 2025-01-09

## 2025-01-09 RX ORDER — CETIRIZINE HYDROCHLORIDE 10 MG/1
10 TABLET ORAL
OUTPATIENT
Start: 2025-02-20

## 2025-01-09 RX ORDER — CETIRIZINE HYDROCHLORIDE 10 MG/1
10 TABLET ORAL
Status: COMPLETED | OUTPATIENT
Start: 2025-01-09 | End: 2025-01-09

## 2025-01-09 RX ORDER — HEPARIN 100 UNIT/ML
500 SYRINGE INTRAVENOUS
Status: DISCONTINUED | OUTPATIENT
Start: 2025-01-09 | End: 2025-01-09 | Stop reason: HOSPADM

## 2025-01-09 RX ORDER — SODIUM CHLORIDE 0.9 % (FLUSH) 0.9 %
10 SYRINGE (ML) INJECTION
OUTPATIENT
Start: 2025-02-20

## 2025-01-09 RX ORDER — DIPHENHYDRAMINE HYDROCHLORIDE 50 MG/ML
50 INJECTION INTRAMUSCULAR; INTRAVENOUS ONCE AS NEEDED
OUTPATIENT
Start: 2025-02-20

## 2025-01-09 RX ADMIN — CETIRIZINE HYDROCHLORIDE 10 MG: 10 TABLET, FILM COATED ORAL at 08:01

## 2025-01-09 RX ADMIN — SODIUM CHLORIDE 500 MG: 9 INJECTION, SOLUTION INTRAVENOUS at 09:01

## 2025-01-09 RX ADMIN — ACETAMINOPHEN 650 MG: 325 TABLET ORAL at 08:01

## 2025-01-09 RX ADMIN — SODIUM CHLORIDE: 9 INJECTION, SOLUTION INTRAVENOUS at 08:01

## 2025-01-10 ENCOUNTER — TELEPHONE (OUTPATIENT)
Dept: GASTROENTEROLOGY | Facility: CLINIC | Age: 50
End: 2025-01-10
Payer: COMMERCIAL

## 2025-01-10 NOTE — TELEPHONE ENCOUNTER
"Prednisone taper    Per PM:  - CD, pancolonic, perianal fistula     IBD meds:  - Inflectra induction    - #1: 12/26 completed    - #2: 1/9 completed    - #3: 2/6   - Maintenance 5mg/kg q8w: 4/3    Current Prednisone: 40m/d  (started 60mg/d 12/18/24, 50mg/d 12/26, 40mg/d 1/3)     - BM/d: 2-3, no liquid stools  - blood: only 1-2x in last 2 wks, no clots  - noc BM/d: gas only  - false BR trips/d: 2-3 gas only  - Overall feels: "I feel great. Gas is really the only aggravating issue I have over the past couple weeks."  - OV: 1/24/25    Dr. Bhat will be updated.    "

## 2025-01-10 NOTE — TELEPHONE ENCOUNTER
----- Message from STEPHEN Neil sent at 1/7/2025  8:27 AM CST -----  - CD, pancolonic, perianal fistula     IBD meds:  - Inflectra 5mg/kg q8w induction    - #1: 12/26 - completed    - #2: 1/9 - completed    - #3: 2/6  - Maintenance start: 4/3    Current Prednisone: 40mg/d  (started 60mg/d 12/18/24, 50mg/d 12/26, 40mg/d 1/3)    - OV: 1/24/25

## 2025-01-13 ENCOUNTER — TELEPHONE (OUTPATIENT)
Dept: ENDOSCOPY | Facility: HOSPITAL | Age: 50
End: 2025-01-13
Payer: COMMERCIAL

## 2025-01-13 VITALS — WEIGHT: 231 LBS | BODY MASS INDEX: 29.65 KG/M2 | HEIGHT: 74 IN

## 2025-01-13 NOTE — TELEPHONE ENCOUNTER
Referral for procedure from [facility]      Spoke to patient to reschedule procedure(s) Colonoscopy       Physician to perform procedure(s) Dr. MINH Bhat  Date of Procedure (s) 2/27/25  Arrival Time 12:00 PM  Time of Procedure(s) 1:00 PM   Location of Procedure(s) [facility]  Type of Rx Prep sent to patient: Miralax  Instructions provided to patient via [facility]    Patient was informed on the following information and verbalized understanding. Screening questionnaire reviewed with patient and complete. If procedure requires anesthesia, a responsible adult needs to be present to accompany the patient home, patient cannot drive after receiving anesthesia. Appointment details are tentative, especially check-in time. Patient will receive a prep-op call 7 days prior to confirm check-in time for procedure. If applicable the patient should contact their pharmacy to verify Rx for procedure prep is ready for pick-up. Patient was advised to call the scheduling department at [phone] if pharmacy states no Rx is available. Patient was advised to call the endoscopy scheduling department if any questions or concerns arise.       Endoscopy Scheduling Department

## 2025-01-14 LAB
INFLIXIMAB DRUG LEVEL: <1 MCG/ML
INFLIXIMAB INTERPRETATION: ABNORMAL

## 2025-01-16 ENCOUNTER — TELEPHONE (OUTPATIENT)
Dept: GASTROENTEROLOGY | Facility: CLINIC | Age: 50
End: 2025-01-16
Payer: COMMERCIAL

## 2025-01-16 LAB
ANTI-INFLIXIMAB ANTIBODY: <20 U/ML
INFLIXIMAB ANTIBODY INTERPRETATION: NORMAL

## 2025-01-16 NOTE — TELEPHONE ENCOUNTER
----- Message from Tianna Bhat MD sent at 1/15/2025  4:32 PM CST -----  Undetectable 2 week trough IFX levels. Discuss with patient. Levels surprised me.   Lets see if we can get 10 mg/kg approved for 3rd dose and repeat 6 week levels to be sure no abs and then will likely need 10 mg/kg q4-6 weeks  Assess symptoms also when you speak with him  We can discuss Thursday    Thanks    SS  ----- Message -----  From: [facility]  Sent: 1/9/2025   8:38 AM CST  To: Tianna Bhat MD

## 2025-01-16 NOTE — TELEPHONE ENCOUNTER
Spoke with Dharmesh:  - reviewed Dr. Bhat's recommendations    Current assessment:   - # BM/d: 2, formed  - # false BR trips/d: 4, passes gas    Dr. Bhat will be updated.

## 2025-01-17 ENCOUNTER — TELEPHONE (OUTPATIENT)
Dept: GASTROENTEROLOGY | Facility: CLINIC | Age: 50
End: 2025-01-17
Payer: COMMERCIAL

## 2025-01-17 DIAGNOSIS — Z51.81 THERAPEUTIC DRUG MONITORING: ICD-10-CM

## 2025-01-17 DIAGNOSIS — K50.10 CROHN'S DISEASE OF COLON WITHOUT COMPLICATION: Primary | ICD-10-CM

## 2025-01-17 NOTE — TELEPHONE ENCOUNTER
----- Message from STEPHEN Neil sent at 1/10/2025  9:26 AM CST -----  - CD, pancolonic, perianal fistula     IBD meds:   - Inflectra 5mg/kg q8w induction     - #1: 12/26 - completed     - #2: 1/9 - completed     - #3: 2/6   - Maintenance start: 4/3     Current Prednisone: 30mg/d   (started 60mg/d 12/18/24, 50mg/d 12/26, 40mg/d 1/3, 30mg/d 1/10)     - OV: 1/24/25

## 2025-01-17 NOTE — TELEPHONE ENCOUNTER
"- Reason for Call: Prednisone update  - Attempt to Contact: Portal message response  - Disease phenotype: CD, pancolonic, perianal fistula   - Current IBD meds:    - Inflectra induction, then every 8 wks    - #1: 12/26 - completed     - #2: 1/9 - completed     - #3: 2/6, lab scheduled prior to this infusion    - Maintenance start: 4/3; RX Adherence: Adherent   - Next OV: 1/24/25     Current Prednisone: 30mg/d   (started 60mg/d 12/18/24, 50mg/d 12/26, 40mg/d 1/3, 30mg/d 1/10)     - #BM/d: 2, soft formed  - # false BR trips/d: 3-4, gas, may pass a pea sized amount of stool  - Overall: "feel good"    - Dr. Bhat updated    "

## 2025-01-17 NOTE — TELEPHONE ENCOUNTER
Confirmed with Dr. Bhat, pt to decrease prednisone to 25mg, not 15mg.    PM sent to pt to decrease prednisone to 25mg/d.

## 2025-01-17 NOTE — TELEPHONE ENCOUNTER
Please advise pt to decrease prednisone to 15 mg daily and stay on this dose until our visit 1/24    SS

## 2025-01-24 ENCOUNTER — OFFICE VISIT (OUTPATIENT)
Dept: GASTROENTEROLOGY | Facility: CLINIC | Age: 50
End: 2025-01-24
Payer: COMMERCIAL

## 2025-01-24 DIAGNOSIS — Z79.52 ON PREDNISONE THERAPY: ICD-10-CM

## 2025-01-24 DIAGNOSIS — T45.1X5A IMMUNODEFICIENCY DUE TO LONG TERM IMMUNOSUPPRESSIVE DRUG THERAPY: ICD-10-CM

## 2025-01-24 DIAGNOSIS — Z51.81 THERAPEUTIC DRUG MONITORING: ICD-10-CM

## 2025-01-24 DIAGNOSIS — Z79.899 IMMUNODEFICIENCY DUE TO LONG TERM IMMUNOSUPPRESSIVE DRUG THERAPY: ICD-10-CM

## 2025-01-24 DIAGNOSIS — L98.9 SKIN LESION: ICD-10-CM

## 2025-01-24 DIAGNOSIS — K50.10 CROHN'S DISEASE OF COLON WITHOUT COMPLICATION: Primary | ICD-10-CM

## 2025-01-24 DIAGNOSIS — D84.821 IMMUNODEFICIENCY DUE TO LONG TERM IMMUNOSUPPRESSIVE DRUG THERAPY: ICD-10-CM

## 2025-01-24 NOTE — Clinical Note
Janeen I let him know about change in site for infusions.  If possible he prefers [facility].  I already set him up for labs at [facility] 2/6 but you can change location if needed.   Thanks SS

## 2025-01-24 NOTE — PATIENT INSTRUCTIONS
- [facility], CMP, trough infliximab   - RN to check next for pred taper 1 week after 3rd infusion- 2/13   - continue pred 25 mg daily until RN calls you on 2/13 but let us know anytime if you have any worsening symptoms  - continue imuran 100 mg daily  - we will proceed with inflectra 10 mg/kg for next dose on 2/6 and then 10 mg/kg every 8 weeks   - we will need to change the site of your infusion for 2/6 and thereafter due to insurance request  - see dermatology but send me pictures and also name of dermatologist you are seeing in Feb and if I can call them or get you in with [facility] dermatologist I will do that   - please increase vit D as recommended---vit D3 5000- 2 pills daily for 3 mos and then 1 pill daily  - make sure when you see dermatology they do a full body skin exam  - turn in stool calprotectin on 2/6/25- at [facility]- pt has orders/stool kit

## 2025-01-24 NOTE — PROGRESS NOTES
"       [facility]             Inflammatory Bowel Disease   Follow-up  Note              TODAY'S VISIT DATE:  1/24/2025    Chief Complaint:   Chief Complaint   Patient presents with    Crohn's Disease     PCP: Unable, To Obtain, declined referral today    Previous History:  Dharmesh Lozoya is a 49 y.o. male with Crohn's disease (pancolonic, perianal fistula), history of C diff (neg 12/2022, 5/2023), and hx of pancreatitis .  Patient was doing well until 2015 and developed blood in his stools and saw family medicine doctor in [place] and had a flex sig and diagnosed "colitis."  He then had a colonoscopy by a GI doctor in [place] and recalls being told distal colitis and started on medication that was too expensive and took for 30 days (sounds like apriso) but too expensive and not effective so discontinued. At that time he did not have insurance so he proceeded with dietary changes (more fish, turmeric).  Within a few weeks symptoms resolved and back to baseline normal BMs 4-5 variable consistency but no blood and able to go back to normal lifestyle. In 2017 he moved from [place] to [place] and in 12/2022 began with worsening bloody diarrhea, cramping and weight loss. He was diagnosed with C diff and vancomycin started which helped significant with his symptoms. His symptoms went from 10-15 BMs/d to 8-12 BMs/d after oral vancomycin.   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  He was then placed on lialda 3.6 g/d with entocort 9 mg/d (decreased to 6 mg/d 3/2023) which he took for 1 month with some response and then discontinued due to ineffectiveness. He was hospitalized 3/2023 at [facility] for 1 days due to significant bloody " diarrhea with weakness and dehydration and he had ran out of entocort a week before and was off of lialda by that time. He was discharged home on prednisone 40 mg daily for 2 weeks and advised to f/u with his GI doctor. In 3/2023 he noticed abscess which spontaneously drained and also at that time had issues with hemorrhoids He then started stelara 4/12/23. In 5/2023 pt had good response in regards to bleeding and stool frequency and at that time C diff recurrent and treated with vancomycin x 10 days.  Approximately about 6 weeks after starting IV stelara noticed initially small pruritic blisters between toes and then progressed to head, arms, legs, feet, toes. These lesions were treated symptomatically with hydroxyzine and he took athlete's foot powder and then eventually got topical cream for horses that helped (antifungal/antibacterial). Diet he found if dairy, spicy foods, red sauce exacerbated symptoms.  Colonoscopy 12/29/23 significant for inflammation with congestion, erosions, friability and mucus in a contiuous and circumferential pattern from anus to sigmoid c/w moderate inflammation (hernandez score 3)-bx c/w moderate active inflammation, CMV neg. Perianal exam at time of scope c/w palpable lump c/w fistula with drainage. He discontinued stelara 12/6/23 due to rash which resolved after discontinutaiton.  In 12/2023 pt started on oral prednisone 40 mg daily for 3 weeks and then decreased to 30 mg/d around 1/20/24 and continued on this when he was initially seen in IBD clinic 2/6/24 at which time he was having 4-5 soft to formed BMs/d. Since first week of Jan 2024 his symptoms started improving. Patient was started on humira 40 mg every 2 weeks on 3/20/2024 and he continued prednisone taper. MRI pelvis on 3/11/24 was significant for left intersphincteric perianal fistula and rectal inflammation. In late 3/2024 he was started on canasa suppositories and we continued the prednisone taper. In 6/14/24, elected to  "start Rinvoq. Pt induced with oral prednisone -24, 24-24 and had extended course of high dose rinvoq 45 mg daily for 6 mos (24-24) but remained steroids dependent though steroids effective. In end 2024 due to some purulent perianal drainage from fistula we started 2 week course of cipro/flagyl which was effective. Stool calprotectin 24 1400, 24 1370 and 10/18/24 176. When diarrhea returned we proceeded with repeat stool studies to rule out infection and colonoscopy significant for fair bowel prep but moderate to severe colitis in the left colon with 2 small localized areas of erythema in ascending colon/appendiceal orifice (bx neg CMV).  We restarted induction prednisone 24 and proceeded with starting inflectra and imuran.     Interval History:  - current IBD meds: Inflectra (5 mg/kg , , 10 mg/kg , 10 mg/kg q 8 weeks 4/3), imuran 100 mg/d (started ), prednisone (24-24, 24-24, restarted 60 mg po daily 24, 50 mg/d , 40 mg/d 1/3, 30 mg/d 1/10, 25 mg/d )   - recent IBD meds:  Rinvoq 45 mg po daily (24-24)- ineffective  - 2-3 formed BMs/d, no blood/urgency/nocturnal BMs and additional 2-3 false trips with gas, no blood, no mucus  - recurrent "blisters" red lesions by eye, arm, trunk, LE, buttocks started few days ago and clear fluid filled, has appt with outside derm 2025  - 24 stool cultures, C diff negative  - 25 2 week trough IFX <1.0/Abs neg  - stool calprotectin: 24 104, 24 1400,  24 1370, 10/18/24 176  - former smoker- discontinued 2024, on nicotine patches   - NSAID use: No  - Narcotic use: No  - Alternative/complementary meds for IBD:   No    Prior Pertinent Surgeries:   None    Last pertinent Endoscopy/Imagin23 colonoscopy:  inflammation with congestion, erosions, friability ad mucus in a contiuous and circumferential pattern from anus to sigmoid c/w moderate " "inflammation (hernandez score 3). Scope not complete due to degree of inflammation. Biopsies showed rectum and sigmoid with moderately active inflammation with ulceration, CMV negative. On colonoscopy there was fistula with palpable lump "peanut" size with drainage.  3/11/24 MRI pelvis: Left intersphincteric perianal fistula at the left lateral approximate 4 o'clock position, measuring approximately 3.8 cm in length.  One suspected point of exit about the left gluteal fold.  Additional suspected blind-ending component about the left gluteal soft tissues.  Suspected granulation tissue about the visualized perianal fistula.  Diffuse rectal mucosal hyperenhancement and wall thickening, as well as inflammatory change about the perirectal fat and multiple prominent perirectal lymph nodes.Prostatomegaly.  Possible right-sided varicocele.    12/3/24 Colonoscopy: Hemorrhoids found on perianal exam. Preparation of the colon was fair. Moderate to severe inflammation in the rectum/sigmoid and descending with 2 small localized areas of erythem in the ascending colon and appendiceal orifice. Biopsied.Two areas of abnormal mucosa in the sigmoid and cecum to assess for dysplasia. Biopsies confirmed active inflammation.     Therapeutic Drug Monitoring Labs:  5/29/24 trough ADA<0.6 with Ab 432 (on humira 40mg Q2W)  1/9/25 2 week trough IFX <1.0/Abs neg (after 1 dose of IFX 5 mg/kg)    Prior IBD Therapies:  Oral mesalamine (unclear which one but seems like apriso)- ineffective but only took for 4 weeks  lialda 3.6 g/d   Entocort 9 mg/d- ineffective  Prednisone - effective (losses response in doses 15 mg or less)  Methotrexate - d/c due to elevated ALT  Stelara 90mg SC every 8 weeks (4/12/23 - 12-6/23) - d/c due to rash which resolved after stopping the medication  Humira (3/20/24 - 5/29/24) - d/c due to undetectable levels and high antibodies   Rinvoq 45 mg po daily (6/14/24-12/17/24)- ineffective    Vaccinations:  Lab Results   Component " Value Date    HEPBSURFABQU Negative 02/06/2024    HEPBSURFABQU <3 02/06/2024     Lab Results   Component Value Date    HEPAIGG Non-reactive 02/06/2024     Lab Results   Component Value Date    VARICELLAZOS 1030.00 02/06/2024    VARICELLAINT Positive 02/06/2024     Lab Results   Component Value Date    MUMPSIGGSCRE <5.00 02/06/2024    MUMPSIGGINTE Negative 02/06/2024      Lab Results   Component Value Date    RUBEOLAIGGAN 64.20 02/06/2024    RUBEOLAINTER Positive 02/06/2024     Immunization History   Administered Date(s) Administered    Pneumococcal Conjugate - 20 Valent 06/14/2024    Zoster Recombinant 06/14/2024, 12/03/2024   Flu shot: recommended yearly,   COVID vaccine/booster:  per CDC recommendations  RSV: after age 49 yo if high risk  Tetanus (Tdap):  last tetanus was over 10 years ago.   HPV: NA     Meningococcal: NA  Hepatitis B: defer to future visit after off of prednisone   Hepatitis A: defer to future visit after off of prednisone  MMR (live vaccine): not immune to mumps, immune to rubella        Review of Systems   Constitutional:  Negative for chills, fever and weight loss.   HENT:          No oral ulcers, dysphagia, oral thrush   Eyes:  Negative for blurred vision, pain and redness.   Respiratory:  Negative for cough and shortness of breath.    Cardiovascular:  Negative for chest pain.   Gastrointestinal:  Negative for abdominal pain, heartburn, nausea and vomiting.   Genitourinary:  Negative for dysuria and hematuria.   Musculoskeletal:  Negative for back pain and joint pain.   Skin:  Negative for rash.   Psychiatric/Behavioral:  Negative for depression. The patient is not nervous/anxious and does not have insomnia.      All Medical History/Surgical History/Family History/Social History/Allergies have been reviewed and updated in EMR    Outpatient Medications Marked as Taking for the 1/24/25 encounter (Office Visit) with Tianna Bhat MD   Medication Sig Dispense Refill    azaTHIOprine (IMURAN)  50 mg Tab Take 2 tablets (100 mg total) by mouth once daily. 60 tablet 0    CALCIUM-MAGNESIUM-ZINC ORAL Take by mouth. Dosing unknown      cholecalciferol, vitamin D3, (VITAMIN D3) 50 mcg (2,000 unit) Cap capsule Take 2,000 Units by mouth once a week.      infliximab-dyyb (INFLECTRA IV) Inject 5 mg/kg into the vein.      predniSONE (DELTASONE) 10 MG tablet Take 6 tablets (60 mg total) by mouth once daily. (Patient taking differently: Take 25 mg by mouth once daily.) 183 tablet 0     Vital Signs:  There were no vitals taken for this visit.   Physical Exam    Labs:   Lab Results   Component Value Date    CRP 10.4 (H) 12/17/2024    CALPROTECTIN >8,000.0 (H) 12/17/2024     Lab Results   Component Value Date    HEPBSAG Non-reactive 12/17/2024    HEPBCAB Non-reactive 12/17/2024     Lab Results   Component Value Date    TBGOLDPLUS Negative 12/17/2024     Lab Results   Component Value Date    BZPUAWSD19EC 23 (L) 02/06/2024    JOOMGWAL85 348 02/06/2024     Lab Results   Component Value Date    WBC 15.82 (H) 01/09/2025    HGB 12.2 (L) 01/09/2025    HCT 38.5 (L) 01/09/2025    MCV 83 01/09/2025     01/09/2025     Lab Results   Component Value Date    CREATININE 1.3 01/09/2025    ALBUMIN 3.4 (L) 01/09/2025    BILITOT 0.4 01/09/2025    ALKPHOS 59 01/09/2025    AST 13 01/09/2025    ALT 20 01/09/2025     Assessment/Plan:  Dharmesh Lozoya is a 49 y.o. male with Crohn's disease (pancolonic, perianal fistula), history of C diff (neg 12/2022, 5/2023), and hx of pancreatitis,  skin lesions with blisters, h/o elevated LFTs.     Patient is feeling better and prednisone has been effective and now on pred 25 mg daily until we check in with him on 2/13. He started inflectra with imuran 100 mg/d on 12/26 but 2 week trough IFX undetectable so we will proceed with inflectra 10 mg/kg dose for 3rd induction dose on 2/13/25 followed by 10 mg/kg q 8 weeks though will adjust frequency interval based on 14 week levels if needed.      # Crohn's  disease (pan-colonic, perianal fistula):    - proceed with increase dose of inflectra for induction #3 10 mg/kg then 10 mg/kg q 8 weeks, notified pt that he will have a change in location of infusion  - continue imuran 100 mg/d   - continue prednisone 25 mg po daily and will continue on this dose until infusion #3 and then consider decreasing to 20 mg daily if feeling well, RN to check in with pt  - colonoscopy scheduled 2/27/25- miralax with gatorade  - basic lab: CRP 2/6/25  - drug monitoring labs: CBC/CMP (2/6/25 then q 3 mos), TPMT (normal 2/2024), TB quantiferon (12/2025), Hep B testing (12/2025)  - TDM: week 6 IFX then week 14 IFX- scheduled at [facility] assuming next infusion at [facility] in [place], thiopurine metabolites week 14    # Skin lesions- red lesions and blisters- eyes, trunk, LE/UE  - pt has dermatology appt 2/2025  - pt instructed to send pictures through portal and who he will be seeing with dermatology  - not related to CD treatment based on history     # Prostate- MRI pelvis showed prostatomegaly and possible right sided varicocele  - will consider urology referral at later date     # Immunodeficiency due to long term immunosuppressive drug therapy and IBD specific health maintenance:  CRC risk- sx 2015, pancolonic, surveillance colonoscopy once in remission  Skin exam- yearly, has derm osvaldo 2/2025  Risk for osteopenia/osteoporosis- long term use of prednisone, taking calcium/vit D  Vitamin D deficiency- previously recommend vit D3 5000- 2 pills daily for 3 mos and then 1 pill daily- has not done this so reminded to do so  Vaccines- no live vaccines    Visit today is associated with current or anticipated ongoing medical care related to this patient's single serious condition/complex condition Crohn's disease.     Follow up in about 11 weeks (around 4/11/2025) for in person. RN phone check 2/13    The patient location is: [place], [place]  The chief complaint leading to  consultation is: Crohn's disease    Visit type: audiovisual    Face to Face time with patient: 20 minutes  40 minutes of total time spent on the encounter, which includes face to face time and non-face to face time preparing to see the patient (eg, review of tests), Obtaining and/or reviewing separately obtained history, Documenting clinical information in the electronic or other health record, Independently interpreting results (not separately reported) and communicating results to the patient/family/caregiver, or Care coordination (not separately reported).     Each patient to whom he or she provides medical services by telemedicine is:  (1) informed of the relationship between the physician and patient and the respective role of any other health care provider with respect to management of the patient; and (2) notified that he or she may decline to receive medical services by telemedicine and may withdraw from such care at any time.    Tianna Bhat MD   Department of Gastroenterology  Medical Director, Inflammatory Bowel Disease

## 2025-01-27 ENCOUNTER — TELEPHONE (OUTPATIENT)
Dept: GASTROENTEROLOGY | Facility: CLINIC | Age: 50
End: 2025-01-27
Payer: COMMERCIAL

## 2025-01-27 DIAGNOSIS — K50.10 CROHN'S DISEASE OF COLON WITHOUT COMPLICATION: ICD-10-CM

## 2025-01-27 RX ORDER — AZATHIOPRINE 50 MG/1
100 TABLET ORAL DAILY
Qty: 60 TABLET | Refills: 2 | Status: SHIPPED | OUTPATIENT
Start: 2025-01-27

## 2025-01-27 NOTE — TELEPHONE ENCOUNTER
- Insurance will no longer cover IFX infusions at hospital based infusion center  - Will switch site of care to [facility]   - Currently on Inflectra (5 mg/kg 12/26, 1/9, 10 mg/kg 2/6, 10 mg/kg q 8 weeks 4/3), imuran 100 mg/d (started 12/26), prednisone (6/14/24-8/6/24, 8/29/24-11/12/24, restarted 60 mg po daily 12/18/24, 50 mg/d 12/27, 40 mg/d 1/3, 30 mg/d 1/10, 25 mg/d 1/17)   - Due to undetectable levels without Ab before his second IFX dose  - Plan to optimize to 10mg/kg for his third IFX induction dose.   - Therapy plan sent to [facility]  - Case dicussed with Dr. Bhat

## 2025-01-27 NOTE — TELEPHONE ENCOUNTER
"Primary provider: Dr. Greer Bhat    IBD medications:  Inflectra (5 mg/kg 12/26, 1/9, 10 mg/kg 2/6, 10 mg/kg q 8 weeks 4/3), imuran 100 mg/d (started 12/26), prednisone (6/14/24-8/6/24, 8/29/24-11/12/24, restarted 60 mg po daily 12/18/24, 50 mg/d 12/27, 40 mg/d 1/3, 30 mg/d 1/10, 25 mg/d 1/17)     Refill request for:      Pended Medication(s)   Requested Prescriptions     Pending Prescriptions Disp Refills    azaTHIOprine (IMURAN) 50 mg Tab 60 tablet 0     Sig: Take 2 tablets (100 mg total) by mouth once daily.           Allergies reviewed: Yes    Drug Monitoring labs/frequency for all IBD meds:    CBC and CMP every 3 months  TB and Hep B every 12 months    Lab Results   Component Value Date    HEPBSAG Non-reactive 12/17/2024    HEPBCAB Non-reactive 12/17/2024     Lab Results   Component Value Date    TBGOLDPLUS Negative 12/17/2024     No results found for: "QUANTNILVALU", "QUANTIFERON", "QUANTTBGDPL"   No results found for: "TSPOTSCREN"  Lab Results   Component Value Date    IQSSRCVM36KU 23 (L) 02/06/2024    NDQSSTPK43 348 02/06/2024     Lab Results   Component Value Date    WBC 15.82 (H) 01/09/2025    HGB 12.2 (L) 01/09/2025    HCT 38.5 (L) 01/09/2025    MCV 83 01/09/2025     01/09/2025     Lab Results   Component Value Date    CREATININE 1.3 01/09/2025    ALBUMIN 3.4 (L) 01/09/2025    BILITOT 0.4 01/09/2025    ALKPHOS 59 01/09/2025    AST 13 01/09/2025    ALT 20 01/09/2025       Lab due date (mo/yr):  4/2025    Labs scheduled: Yes     Next appt: 5/1/2025    RX refill sent to provider for amount until next labs: Yes       "

## 2025-01-28 ENCOUNTER — TELEPHONE (OUTPATIENT)
Dept: GASTROENTEROLOGY | Facility: CLINIC | Age: 50
End: 2025-01-28
Payer: COMMERCIAL

## 2025-01-28 NOTE — TELEPHONE ENCOUNTER
----- Message from Sana sent at 1/28/2025 11:07 AM CST -----  Regarding: Weight needed  Type:  Pharmacy Calling to Clarify an RX    Name of Caller:Pharmacist   Pharmacy Name:[facility]   Prescription Name:Inflectra   What do they need to clarify?:Need the pt's weight in Kilograms for dispersing.   Best Call Back Number:[phone]  Additional Information: Thank you.

## 2025-02-03 NOTE — TELEPHONE ENCOUNTER
Authorization for Infliximab 10mg/kg at week 6 approved today  Pt scheduled to receive third IV induction at local [facility] on  2/11  Requested he gets rescheduled to 2/6 as that is his original date

## 2025-02-07 ENCOUNTER — PATIENT MESSAGE (OUTPATIENT)
Dept: GASTROENTEROLOGY | Facility: CLINIC | Age: 50
End: 2025-02-07
Payer: COMMERCIAL

## 2025-02-07 DIAGNOSIS — K50.10 CROHN'S DISEASE OF COLON WITHOUT COMPLICATION: ICD-10-CM

## 2025-02-07 RX ORDER — PREDNISONE 10 MG/1
25 TABLET ORAL DAILY
Qty: 90 TABLET | Refills: 0 | Status: SHIPPED | OUTPATIENT
Start: 2025-02-07

## 2025-02-12 ENCOUNTER — TELEPHONE (OUTPATIENT)
Dept: GASTROENTEROLOGY | Facility: CLINIC | Age: 50
End: 2025-02-12
Payer: COMMERCIAL

## 2025-02-13 ENCOUNTER — TELEPHONE (OUTPATIENT)
Dept: GASTROENTEROLOGY | Facility: CLINIC | Age: 50
End: 2025-02-13
Payer: COMMERCIAL

## 2025-02-13 NOTE — TELEPHONE ENCOUNTER
----- Message from STEPHEN Neil sent at 1/24/2025  1:51 PM CST -----  - CD, pancolonic, perianal fistula      IBD meds:   - Inflectra 5mg/kg q8w induction     - #1: 12/26 - completed     - #2: 1/9 - completed     - #3: 2/6 - completed  - Maintenance start: 4/3      Current Prednisone: 25mg/d   (started 60mg/d 12/18/24, 50mg/d 12/26, 40mg/d 1/3, 30mg/d 1/10, 25mg/d 1/17)     OV: 5/1/25    ## Per OV 1/24: consideration to decrease pred to 20mg daily on 2/14 if doing well and will also see if pt saw dermatologist ##

## 2025-02-14 NOTE — TELEPHONE ENCOUNTER
Called and spoke with pt  - reports stable symptoms  - has been off prednisone for 3 days   - currently on Inflectra 10 mg/kg q8w (started 5mg/kg on 12/26/24, dose #2 of 5mg/kg on 1/9/25, optimized to 10 mg/kg and LD 2/7, first maintenance dose on 4/3) + imuran 100 mg/d (started 12/26/24)    - IFX levels scheduled to draw on 2/6 at [facility] lab - unclear why  did not draw correct labs  - pt had undetectable IFX level without Ab before second dose on 1/9/25  - since labs not drawn correctly will reschedule IFX levels 4 weeks after LD (3/7/25)  - in the meantime, considering pts complex history and undetectable levels at week 2 will proactively optimize his IFX dose to 10mg/kg every 6 weeks   - therapy plan updated and sent to [facility] for auth  - advised he reaches out if his symptoms change in the meantime   - patient verbalized understanding plan of care   - case dicussed with Dr. Bhat

## 2025-02-14 NOTE — TELEPHONE ENCOUNTER
"- Reason for Call: Prednisone update  - Contact: Called & spoke to patient  - Symptom details:   BM/d: 3-5, soft formed, soft chunks that float, brown  blood: occ on TP only, not every day  Gas: daily and this is his biggest complaint  noc BM/d: if so he passes gas and dime sized stool  false BR trips/d: same as above  Overall: feels good   - Disease phenotype: CD, pancolonic, perianal fistula   - Current IBD meds:   Inflectra 5mg/kg q8w (started 12/26/24; LD: 2/7, ND: 4/3 1st maintenance dose), changed to [facility] for infusions  Imuran 100mg/d  - Current prednisone dose: None  (started 60mg/d 12/18/24, 50mg/d 12/26, 40mg/d 1/3, 30mg/d 1/10, 25mg/d 1/17, self tapered by 5mg/wk, LD: 2/11)   Plan as of 1/24/25, stay on Prednisone 25mg/d through 3rd dose Inflectra then consider taper to 20mg/d if well  - RX Adherence: Adherent, but not with Prednisone taper, self tapered off by 5mg/wk over "several weeks" d/t weight gain  - Additional pertinent information (recent lab, scope, imaging, etc.): Infliximab level was expected but not drawn by lab on 2/6/25  - Next OV: 5/1/25, Colonoscopy 2/27/25  - Provider: Dr. Bhat updated    "

## 2025-02-25 ENCOUNTER — TELEPHONE (OUTPATIENT)
Dept: ENDOSCOPY | Facility: HOSPITAL | Age: 50
End: 2025-02-25
Payer: COMMERCIAL

## 2025-02-25 ENCOUNTER — TELEPHONE (OUTPATIENT)
Dept: GASTROENTEROLOGY | Facility: CLINIC | Age: 50
End: 2025-02-25
Payer: COMMERCIAL

## 2025-02-25 NOTE — TELEPHONE ENCOUNTER
PA APPROVED (Inflectra IV)  Case ID: [id number]   Authorized: 1/31/2026-1/31/2026        Approval letter uploaded in media.

## 2025-02-25 NOTE — TELEPHONE ENCOUNTER
[facility]erral [facility] below      Spoke to pt to reschedule procedure(s) Colonoscopy       Physician to perform procedure(s) Dr. MINH Bhat  Date of Procedure (s) 5/8/25  Arrival Time 1:00 PM  Time of Procedure(s) 2:00 PM   Location of Procedure(s) [facility]  Type of Rx Prep sent to patient: Miralax  Instructions provided to [facility] via [facility]    Patient was informed on the following information and verbalized understanding. Screening questionnaire reviewed with patient and complete. If procedure requires anesthesia, a responsible adult needs to be present to accompany the patient home, patient cannot drive after receiving anesthesia. Appointment details are tentative, especially check-in time. Patient will receive a prep-op call 7 days prior to confirm check-in time for procedure. If applicable the patient should contact their pharmacy to verify Rx for procedure prep is ready for pick-up. Patient was advised to call the scheduling department at [phone] if pharmacy states no Rx is available. Patient was advised to call the endoscopy scheduling department if any questions or concerns arise.       Endoscopy Scheduling Department

## 2025-02-25 NOTE — TELEPHONE ENCOUNTER
"Received message from Endoscopy Lab pt needs to reschedule upcoming colonoscopy.  Original order below.  Telephoned pt with no answer.  Left voicemail message with direct contact number for pt to return call.          From: Jolynn Chicas RN   Sent: 2024  12:00 AM CST   To: Melissa Terrazas MA; *     Procedure: Colonoscopy     Diagnosis: Crohn's disease     Procedure Timin-12 weeks; 2025     *If within 4 weeks selected, please estuardo as high priority*     *If greater than 12 weeks, please select "5-12 weeks" and delay sending until 2 months prior to requested date*     Provider: MATIAS Bhat     Location: Any Site     Additional Scheduling Information: No scheduling concerns     Prep Specifications:Standard prep     Is the patient taking a GLP-1 Agonist:no     Have you attached a patient to this message: yes   "

## 2025-02-26 ENCOUNTER — TELEPHONE (OUTPATIENT)
Dept: ENDOSCOPY | Facility: HOSPITAL | Age: 50
End: 2025-02-26
Payer: COMMERCIAL

## 2025-02-26 NOTE — TELEPHONE ENCOUNTER
"Received message pt needs to be rescheduled due to MD bookout.    [facility]        Spoke to pt to reschedule procedure(s) Colonoscopy       Physician to perform procedure(s) Dr. MINH Bhat  Date of Procedure (s) 25  Arrival Time 1:20 PM  Time of Procedure(s) 2:20 PM   Location of Procedure(s) [address]  Type of Rx Prep sent to patient: Miralax  Instructions provided to patient via [facility]     Patient was informed on the following information and verbalized understanding. Screening questionnaire reviewed with patient and complete. If procedure requires anesthesia, a responsible adult needs to be present to accompany the patient home, patient cannot drive after receiving anesthesia. Appointment details are tentative, especially check-in time. Patient will receive a prep-op call 7 days prior to confirm check-in time for procedure. If applicable the patient should contact their pharmacy to verify Rx for procedure prep is ready for pick-up. Patient was advised to call the scheduling department at [phone] if pharmacy states no Rx is available. Patient was advised to call the endoscopy scheduling department if any questions or concerns arise.         Endoscopy Scheduling Department      From: Jolynn Chicas RN   Sent: 2024  12:00 AM CST   To: Melissa Terrazas MA; *     Procedure: Colonoscopy     Diagnosis: Crohn's disease     Procedure Timin-12 weeks; 2025     *If within 4 weeks selected, please estuardo as high priority*     *If greater than 12 weeks, please select "5-12 weeks" and delay sending until 2 months prior to requested date*     Provider: MATIAS Bhat     Location: Any Site     Additional Scheduling Information: No scheduling concerns     Prep Specifications:Standard prep     Is the patient taking a GLP-1 Agonist:no     Have you attached a patient to this message: yes   "

## 2025-03-03 ENCOUNTER — TELEPHONE (OUTPATIENT)
Dept: ENDOSCOPY | Facility: HOSPITAL | Age: 50
End: 2025-03-03
Payer: COMMERCIAL

## 2025-03-03 NOTE — TELEPHONE ENCOUNTER
Contacted pt to reschedule colonoscopy due to Dr. Bhat no longer scoping on 5/22.   The patient did not answer the call and left a voice message requesting a call back.

## 2025-03-05 ENCOUNTER — TELEPHONE (OUTPATIENT)
Dept: ENDOSCOPY | Facility: HOSPITAL | Age: 50
End: 2025-03-05
Payer: COMMERCIAL

## 2025-03-13 ENCOUNTER — PATIENT MESSAGE (OUTPATIENT)
Dept: GASTROENTEROLOGY | Facility: CLINIC | Age: 50
End: 2025-03-13
Payer: COMMERCIAL

## 2025-03-19 LAB
INFLIXIMAB AB SERPL-MCNC: <22 NG/ML
INFLIXIMAB SERPL-MCNC: 9.4 UG/ML

## 2025-03-31 ENCOUNTER — TELEPHONE (OUTPATIENT)
Dept: GASTROENTEROLOGY | Facility: CLINIC | Age: 50
End: 2025-03-31
Payer: COMMERCIAL

## 2025-03-31 ENCOUNTER — RESULTS FOLLOW-UP (OUTPATIENT)
Dept: GASTROENTEROLOGY | Facility: CLINIC | Age: 50
End: 2025-03-31
Payer: COMMERCIAL

## 2025-03-31 DIAGNOSIS — K50.10 CROHN'S DISEASE OF COLON WITHOUT COMPLICATION: ICD-10-CM

## 2025-03-31 RX ORDER — AZATHIOPRINE 50 MG/1
100 TABLET ORAL DAILY
Qty: 60 TABLET | Refills: 1 | Status: SHIPPED | OUTPATIENT
Start: 2025-03-31

## 2025-03-31 NOTE — TELEPHONE ENCOUNTER
----- Message from Tianna Bhat MD sent at 3/25/2025 12:30 PM CDT -----    ----- Message -----  From: Shivani Goss  Sent: 3/19/2025   6:09 AM CDT  To: Tianna Bhat MD

## 2025-03-31 NOTE — TELEPHONE ENCOUNTER
- 50 YOM with Crohn's disease (pancolonic, perianal fistula)   - currently on infliximab 10mg/kg every 6 weeks (started 5mg/kg on 12/26/24, 2nd dose 1/9/25, optimized to 10mg/kg Q6W and 3rd dose on 2/7, LD 3/21) + imuran 100 mg/d (started 12/26/24)    - IFX trough levels before 2nd dose (1/9/25) were undetectable without Ab  - IFX trough levels at week 6 were missed  - IFX trough levels 4 weeks after third induction (week 10) were 9.4 without Ab  - considering history of fistulizing disease, Ab formation to other TNF inhibitors in the past, and IFX trough levels 4 weeks after LD will further optimize treatment to infliximab 10mg/kg every 4 weeks  - therapy plan updated and sent to [facility]; pt to get ND on 4/18  - will proceed with colonoscopy as scheduled on 5/13 and reschedule his OV to 5/14 instead of 5/1  - called pt to review plan of care, unable to reach, LVM and will send portal message  - case discussed with Dr. Bhat

## 2025-03-31 NOTE — TELEPHONE ENCOUNTER
Called and spoke with pt  - discussed infliximab trough levels 4 weeks post 3rd infusion and reviewed plan of care  - pt reports delaying his LD from 3/21 to 3/28 due to being out of town  - requested additional azathioprine, running out of current refill  - informed pt of his OV being rescheduled to 5/14 after his scope (5/13)   - verbalized understanding instructions and agrees with plan of care  - refill for AZA sent to local pharmacy   - will follow up with pt when his dose optimization is approved       - currently on infliximab 10mg/kg every 6 weeks (started 5mg/kg on 12/26/24, 2nd dose 1/9/25, optimized to 10mg/kg Q6W and 3rd dose on 2/7, LD due 3/21 but delayed to 3/28 due to being out of town) + imuran 100 mg/d (started 12/26/24)

## 2025-05-13 ENCOUNTER — HOSPITAL ENCOUNTER (OUTPATIENT)
Facility: HOSPITAL | Age: 50
Discharge: HOME | End: 2025-05-13
Attending: INTERNAL MEDICINE | Admitting: INTERNAL MEDICINE
Payer: COMMERCIAL

## 2025-05-13 ENCOUNTER — ANESTHESIA EVENT (OUTPATIENT)
Dept: ENDOSCOPY | Facility: HOSPITAL | Age: 50
End: 2025-05-13
Payer: COMMERCIAL

## 2025-05-13 ENCOUNTER — ANESTHESIA (OUTPATIENT)
Dept: ENDOSCOPY | Facility: HOSPITAL | Age: 50
End: 2025-05-13
Payer: COMMERCIAL

## 2025-05-13 VITALS
HEART RATE: 56 BPM | BODY MASS INDEX: 29.77 KG/M2 | WEIGHT: 231.94 LBS | HEIGHT: 74 IN | TEMPERATURE: 98 F | SYSTOLIC BLOOD PRESSURE: 110 MMHG | OXYGEN SATURATION: 98 % | RESPIRATION RATE: 16 BRPM | DIASTOLIC BLOOD PRESSURE: 62 MMHG

## 2025-05-13 DIAGNOSIS — K50.918 CROHN'S DISEASE WITH OTHER COMPLICATION, UNSPECIFIED GASTROINTESTINAL TRACT LOCATION: ICD-10-CM

## 2025-05-13 PROCEDURE — 25000003 PHARM REV CODE 250

## 2025-05-13 PROCEDURE — 37000009 HC ANESTHESIA EA ADD 15 MINS: Performed by: INTERNAL MEDICINE

## 2025-05-13 PROCEDURE — 63600175 PHARM REV CODE 636 W HCPCS

## 2025-05-13 PROCEDURE — 37000008 HC ANESTHESIA 1ST 15 MINUTES: Performed by: INTERNAL MEDICINE

## 2025-05-13 PROCEDURE — 27201012 HC FORCEPS, HOT/COLD, DISP: Performed by: INTERNAL MEDICINE

## 2025-05-13 PROCEDURE — 45380 COLONOSCOPY AND BIOPSY: CPT | Mod: 74 | Performed by: INTERNAL MEDICINE

## 2025-05-13 PROCEDURE — 88342 IMHCHEM/IMCYTCHM 1ST ANTB: CPT | Mod: TC,59 | Performed by: INTERNAL MEDICINE

## 2025-05-13 PROCEDURE — 45380 COLONOSCOPY AND BIOPSY: CPT | Mod: 52,,, | Performed by: INTERNAL MEDICINE

## 2025-05-13 RX ORDER — SODIUM CHLORIDE 9 MG/ML
INJECTION, SOLUTION INTRAVENOUS CONTINUOUS
Status: DISCONTINUED | OUTPATIENT
Start: 2025-05-13 | End: 2025-05-14 | Stop reason: HOSPADM

## 2025-05-13 RX ORDER — PROPOFOL 10 MG/ML
VIAL (ML) INTRAVENOUS CONTINUOUS PRN
Status: DISCONTINUED | OUTPATIENT
Start: 2025-05-13 | End: 2025-05-13

## 2025-05-13 RX ORDER — LIDOCAINE HYDROCHLORIDE 20 MG/ML
INJECTION INTRAVENOUS
Status: DISCONTINUED | OUTPATIENT
Start: 2025-05-13 | End: 2025-05-13

## 2025-05-13 RX ADMIN — SODIUM CHLORIDE: 0.9 INJECTION, SOLUTION INTRAVENOUS at 02:05

## 2025-05-13 RX ADMIN — PROPOFOL 150 MCG/KG/MIN: 10 INJECTION, EMULSION INTRAVENOUS at 02:05

## 2025-05-13 RX ADMIN — PROPOFOL 200 MG: 10 INJECTION, EMULSION INTRAVENOUS at 02:05

## 2025-05-13 RX ADMIN — LIDOCAINE HYDROCHLORIDE 60 MG: 20 INJECTION INTRAVENOUS at 02:05

## 2025-05-13 NOTE — ANESTHESIA POSTPROCEDURE EVALUATION
Anesthesia Post Evaluation    Patient: Dharmesh Lozoya    Procedure(s) Performed: Procedure(s) (LRB):  COLONOSCOPY (N/A)    Final Anesthesia Type: general      Patient location during evaluation: PACU  Patient participation: Yes- Able to Participate  Level of consciousness: awake and alert  Post-procedure vital signs: reviewed and stable  Pain management: adequate  Airway patency: patent    PONV status at discharge: No PONV  Anesthetic complications: no      Cardiovascular status: blood pressure returned to baseline  Respiratory status: unassisted  Follow-up not needed.              Vitals Value Taken Time   /64 05/13/25 14:56   Temp 36.6 °C (97.9 °F) 05/13/25 14:45   Pulse 71 05/13/25 14:56   Resp 16 05/13/25 14:56   SpO2 98 % 05/13/25 14:56         No case tracking events are documented in the log.      Pain/Renetta Score: Renetta Score: 10 (5/13/2025  2:56 PM)

## 2025-05-13 NOTE — TRANSFER OF CARE
"Anesthesia Transfer of Care Note    Patient: Dharmesh Lozoya    Procedure(s) Performed: Procedure(s) (LRB):  COLONOSCOPY (N/A)    Patient location: [facility]    Anesthesia Type: general    Transport from OR: Transported from OR on room air with adequate spontaneous ventilation    Post pain: adequate analgesia    Post assessment: no apparent anesthetic complications and tolerated procedure well    Post vital signs: stable    Level of consciousness: responds to stimulation    Nausea/Vomiting: no nausea/vomiting    Complications: none    Transfer of care protocol was followed      Last vitals: Visit Vitals  /84 (BP Location: Left arm, Patient Position: Lying)   Pulse 69   Temp 36.7 °C (98 °F) (Temporal)   Resp 16   Ht 6' 2" (1.88 m)   Wt 105.2 kg (231 lb 14.8 oz)   SpO2 98%   BMI 29.78 kg/m²     "

## 2025-05-13 NOTE — H&P
Short Stay Endoscopy History and Physical    PCP - Unable, To Obtain  Referring Physician - Tianna Bhat MD  [address] [address]    Procedure - Colonoscopy  ASA - per anesthesia  Mallampati - per anesthesia  History of Anesthesia problems - no  Family history Anesthesia problems -  no   Plan of anesthesia - General    HPI  50 y.o. male  Reason for procedure: Crohn's disease f/u      ROS:  Constitutional: No fevers, chills, No weight loss  CV: No chest pain  Pulm: No cough, No shortness of breath  GI: see HPI    Medical History:  has a past medical history of Crohn's disease and Recurrent Clostridioides difficile infection.    Surgical History:  has a past surgical history that includes Colonoscopy (N/A, 12/3/2024).    Family History: family history includes Anuerysm in his brother; Heart attack in his father and mother; Hypertension in his father and mother; Inflammatory bowel disease in his father; Seizures in his brother..    Social History:  reports that he has quit smoking. His smoking use included cigarettes. He started smoking about 16 years ago. He has a 16.3 pack-year smoking history. His smokeless tobacco use includes snuff. He reports current alcohol use. He reports that he does not use drugs.    Review of patient's allergies indicates:   Allergen Reactions    Stelara [ustekinumab] Blisters       Medications:   Prescriptions Prior to Admission[1]    Physical Exam:    Vital Signs: There were no vitals filed for this visit.    General Appearance: Well appearing in no acute distress  Abdomen: Soft, non tender, non distended with normal bowel sounds, no masses    Labs:  Lab Results   Component Value Date    WBC 12.90 (H) 02/06/2025    HGB 12.1 (L) 02/06/2025    HCT 38.5 (L) 02/06/2025     02/06/2025    CHOL 201 (H) 08/23/2024    TRIG 86 08/23/2024    HDL 42 08/23/2024    ALT 30 02/06/2025    AST 27 02/06/2025     02/06/2025    K 3.7 02/06/2025     02/06/2025     CREATININE 1.15 02/06/2025    BUN 16 02/06/2025    CO2 29 02/06/2025    TSH 0.411 02/06/2024       I have explained the risks and benefits of this endoscopic procedure to the patient including but not limited to bleeding, inflammation, infection, perforation, and death.      Tianna Bhat MD         [1]   Medications Prior to Admission   Medication Sig Dispense Refill Last Dose/Taking    azaTHIOprine (IMURAN) 50 mg Tab Take 2 tablets (100 mg total) by mouth once daily. 60 tablet 1     CALCIUM-MAGNESIUM-ZINC ORAL Take by mouth. Dosing unknown       cholecalciferol, vitamin D3, (VITAMIN D3) 50 mcg (2,000 unit) Cap capsule Take 2,000 Units by mouth once a week.       infliximab-dyyb (INFLECTRA IV) Inject 10 mg/kg into the vein every 8 weeks.       infliximab-dyyb (INFLECTRA IV) Inject 5 mg/kg into the vein.       predniSONE (DELTASONE) 10 MG tablet Take 2.5 tablets (25 mg total) by mouth once daily. Taper per Dr. Bhat's instruction 90 tablet 0

## 2025-05-13 NOTE — [employer]OV[employer] PATIENT INSTRUCTIONS
Discharge Summary/Instructions after an Endoscopic Procedure  Patient Name: Dharmesh Lozoya  Patient MRN: [id number]  Patient YOB: 1975  Tuesday, May 13, 2025  Tianna Bhat MD  Dear patient,  As a result of recent federal legislation (The Federal Cures Act), you may   receive lab or pathology results from your procedure in your [facility]   account before your physician is able to contact you. Your physician or   their representative will relay the results to you with their   recommendations at their soonest availability.  Thank you,  RESTRICTIONS:  During your procedure today, you received medications for sedation.  These   medications may affect your judgment, balance and coordination.  Therefore,   for 24 hours, you have the following restrictions:   - DO NOT drive a car, operate machinery, make legal/financial decisions,   sign important papers or drink alcohol.    ACTIVITY:  Today: no heavy lifting, straining or running due to procedural   sedation/anesthesia.  The following day: return to full activity including work.  DIET:  Eat and drink normally unless instructed otherwise.     TREATMENT FOR COMMON SIDE EFFECTS:  - Mild abdominal pain, nausea, belching, bloating or excessive gas:  rest,   eat lightly and use a heating pad.  - Sore Throat: treat with throat lozenges and/or gargle with warm salt   water.  - Because air was used during the procedure, expelling large amounts of air   from your rectum or belching is normal.  - If a bowel prep was taken, you may not have a bowel movement for 1-3 days.    This is normal.  SYMPTOMS TO WATCH FOR AND REPORT TO YOUR PHYSICIAN:  1. Abdominal pain or bloating, other than gas cramps.  2. Chest pain.  3. Back pain.  4. Signs of infection such as: chills or fever occurring within 24 hours   after the procedure.  5. Rectal bleeding, which would show as bright red, maroon, or black stools.   (A tablespoon of blood from the rectum is not serious, especially if    hemorrhoids are present.)  6. Vomiting.  7. Weakness or dizziness.  GO DIRECTLY TO THE NEAREST EMERGENCY ROOM IF YOU HAVE ANY OF THE FOLLOWING:      Difficulty breathing              Chills and/or fever over 101 F   Persistent vomiting and/or vomiting blood   Severe abdominal pain   Severe chest pain   Black, tarry stools   Bleeding- more than one tablespoon   Any other symptom or condition that you feel may need urgent attention  Your doctor recommends these additional instructions:  If any biopsies were taken, your doctors clinic will contact you in 1 to 2   weeks with any results.  - Discharge patient to home.   - Patient has a contact number available for emergencies.  The signs and   symptoms of potential delayed complications were discussed with the   patient.  Return to normal activities tomorrow.  Written discharge   instructions were provided to the patient.   - Resume previous diet.   - Continue present medications.   - Await pathology results.   - Repeat colonoscopy (date not yet determined).   - Return to GI clinic as previously scheduled.  For questions, problems or results please call your physician - Tianna Bhat MD at Work:  [phone].  [facility], EMERGENCY ROOM PHONE NUMBER: [phone]  IF A COMPLICATION OR EMERGENCY SITUATION ARISES AND YOU ARE UNABLE TO REACH   YOUR PHYSICIAN - GO DIRECTLY TO THE EMERGENCY ROOM.  Tianna Bhat MD  5/13/2025 2:50:38 PM  This report has been verified and signed electronically.  Dear patient,  As a result of recent federal legislation (The Federal Cures Act), you may   receive lab or pathology results from your procedure in your [facility]   account before your physician is able to contact you. Your physician or   their representative will relay the results to you with their   recommendations at their soonest availability.  Thank you,  [employer]ATION

## 2025-05-13 NOTE — ANESTHESIA PREPROCEDURE EVALUATION
05/13/2025  Pre-operative evaluation for Procedure(s) (LRB):  COLONOSCOPY (N/A)    Dharmesh Lozoya is a 50 y.o. male     Problem List[1]    Review of patient's allergies indicates:   Allergen Reactions    Stelara [ustekinumab] Blisters       Medications Ordered Prior to Encounter[2]    Past Surgical History:   Procedure Laterality Date    COLONOSCOPY N/A 12/3/2024    Procedure: COLONOSCOPY;  Surgeon: Tianna Bhat MD;  Location: [address];  Service: Endoscopy;  Laterality: N/A;  Ref By:,scot cho.  11/25-pre call complete-confirmed 1140am arrival       Social History[3]            Pre-op Assessment    I have reviewed the Patient Summary Reports.     I have reviewed the Nursing Notes. I have reviewed the NPO Status.      Review of Systems  Anesthesia Hx:  No problems with previous Anesthesia                Cardiovascular:  Cardiovascular Normal                                              Pulmonary:  Pulmonary Normal                       Renal/:  Renal/ Normal                 Hepatic/GI:        Crohn's disease             Neurological:  Neurology Normal                                      Endocrine:  Endocrine Normal                Physical Exam    Airway:  Mallampati: II   Mouth Opening: Normal  Tongue: Normal    Chest/Lungs:  Normal Respiratory Rate    Heart:  Rhythm: Regular Rhythm        Anesthesia Plan  Type of Anesthesia, risks & benefits discussed:    Anesthesia Type: Gen Natural Airway  Intra-op Monitoring Plan: Standard ASA Monitors  Induction:  IV  Informed Consent: Informed consent signed with the Patient and all parties understand the risks and agree with anesthesia plan.  All questions answered.   ASA Score: 2    Ready For Surgery From Anesthesia Perspective.     .           [1]   Patient Active Problem List  Diagnosis    Recurrent Clostridioides difficile infection     Immunodeficiency due to long term immunosuppressive drug therapy    Crohn's disease of colon without complication    Skin lesion   [2]   No current facility-administered medications on file prior to encounter.     Current Outpatient Medications on File Prior to Encounter   Medication Sig Dispense Refill    CALCIUM-MAGNESIUM-ZINC ORAL Take by mouth. Dosing unknown      cholecalciferol, vitamin D3, (VITAMIN D3) 50 mcg (2,000 unit) Cap capsule Take 2,000 Units by mouth once a week.     [3]   Social History  Socioeconomic History    Marital status:    Tobacco Use    Smoking status: Former     Current packs/day: 1.00     Average packs/day: 1 pack/day for 16.3 years (16.3 ttl pk-yrs)     Types: Cigarettes     Start date: 2/2/2009    Smokeless tobacco: Current     Types: Snuff   Substance and Sexual Activity    Alcohol use: Yes     Comment: socially    Drug use: Never    Sexual activity: Yes     Partners: Female     Birth control/protection: Partner-Vasectomy   Social History Narrative    N/a per the patient      Social Drivers of Health     Financial Resource Strain: Low Risk  (1/22/2025)    Overall Financial Resource Strain (CARDIA)     Difficulty of Paying Living Expenses: Not hard at all   Food Insecurity: No Food Insecurity (1/22/2025)    Hunger Vital Sign     Worried About Running Out of Food in the Last Year: Never true     Ran Out of Food in the Last Year: Never true   Physical Activity: Inactive (1/22/2025)    Exercise Vital Sign     Days of Exercise per Week: 0 days     Minutes of Exercise per Session: 0 min   Stress: No Stress Concern Present (1/22/2025)    [employer] - Occupational Stress Questionnaire     Feeling of Stress : Not at all   Housing Stability: Unknown (1/22/2025)    Housing Stability Vital Sign     Unable to Pay for Housing in the Last Year: No

## 2025-05-13 NOTE — PLAN OF CARE
Pt with 2 runs of svt resolved with carotid massage. Anesthesia md at bedside. Beta blocker administered. Will continue to monitor.

## 2025-05-14 ENCOUNTER — PATIENT MESSAGE (OUTPATIENT)
Dept: GASTROENTEROLOGY | Facility: CLINIC | Age: 50
End: 2025-05-14

## 2025-05-14 ENCOUNTER — OFFICE VISIT (OUTPATIENT)
Dept: GASTROENTEROLOGY | Facility: CLINIC | Age: 50
End: 2025-05-14
Payer: COMMERCIAL

## 2025-05-14 DIAGNOSIS — L98.9 SKIN LESION: ICD-10-CM

## 2025-05-14 DIAGNOSIS — T45.1X5A IMMUNODEFICIENCY DUE TO LONG TERM IMMUNOSUPPRESSIVE DRUG THERAPY: ICD-10-CM

## 2025-05-14 DIAGNOSIS — K50.10 CROHN'S DISEASE OF COLON WITHOUT COMPLICATION: Primary | ICD-10-CM

## 2025-05-14 DIAGNOSIS — Z79.60 IMMUNODEFICIENCY DUE TO LONG TERM IMMUNOSUPPRESSIVE DRUG THERAPY: ICD-10-CM

## 2025-05-14 DIAGNOSIS — D84.821 IMMUNODEFICIENCY DUE TO LONG TERM IMMUNOSUPPRESSIVE DRUG THERAPY: ICD-10-CM

## 2025-05-14 PROCEDURE — 98014 SYNCH AUDIO-ONLY EST MOD 30: CPT | Mod: 93,,, | Performed by: INTERNAL MEDICINE

## 2025-05-14 RX ORDER — AZATHIOPRINE 50 MG/1
100 TABLET ORAL DAILY
Qty: 60 TABLET | Refills: 0 | Status: SHIPPED | OUTPATIENT
Start: 2025-05-14

## 2025-05-14 NOTE — PROGRESS NOTES
"     [facility]             Inflammatory Bowel Disease   Follow-up Note              TODAY'S VISIT DATE:  5/14/2025    Chief Complaint:   Chief Complaint   Patient presents with    Crohn's Disease     PCP: Unable, To Obtain    Previous History:  Dharmesh Lozoya is a 50 y.o. male with Crohn's disease (pancolonic, perianal fistula), history of C diff (neg 12/2022, 5/2023), and hx of pancreatitis . Patient was doing well until 2015 and developed blood in his stools and saw family medicine doctor in [place] and had a flex sig and diagnosed "colitis." He then had a colonoscopy by a GI doctor in [place] and recalls being told distal colitis and started on medication that was too expensive and took for 30 days (sounds like apriso) but too expensive and not effective so discontinued. At that time he did not have insurance so he proceeded with dietary changes (more fish, turmeric). Within a few weeks symptoms resolved and back to baseline normal BMs 4-5 variable consistency but no blood and able to go back to normal lifestyle. In 2017 he moved from [place] to [place] and in 12/2022 began with worsening bloody diarrhea, cramping and weight loss. He was diagnosed with C diff and vancomycin started which helped significant with his symptoms. His symptoms went from 10-15 BMs/d to 8-12 BMs/d after oral vancomycin. 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 He was then placed on lialda 3.6 g/d with entocort 9 mg/d (decreased to 6 mg/d 3/2023) which he took for 1 month with some response and then discontinued due to ineffectiveness. He was hospitalized 3/2023 at [facility] for 1 days due to significant bloody diarrhea with weakness and " dehydration and he had ran out of entocort a week before and was off of lialda by that time. He was discharged home on prednisone 40 mg daily for 2 weeks and advised to f/u with his GI doctor. In 3/2023 he noticed abscess which spontaneously drained and also at that time had issues with hemorrhoids He then started stelara 4/12/23. In 5/2023 pt had good response in regards to bleeding and stool frequency and at that time C diff recurrent and treated with vancomycin x 10 days. Approximately about 6 weeks after starting IV stelara noticed initially small pruritic blisters between toes and then progressed to head, arms, legs, feet, toes. These lesions were treated symptomatically with hydroxyzine and he took athlete's foot powder and then eventually got topical cream for horses that helped (antifungal/antibacterial). Diet he found if dairy, spicy foods, red sauce exacerbated symptoms. Colonoscopy 12/29/23 significant for inflammation with congestion, erosions, friability and mucus in a contiuous and circumferential pattern from anus to sigmoid c/w moderate inflammation (hernandez score 3)-bx c/w moderate active inflammation, CMV neg. Perianal exam at time of scope c/w palpable lump c/w fistula with drainage. He discontinued stelara 12/6/23 due to rash which resolved after discontinutaiton. In 12/2023 pt started on oral prednisone 40 mg daily for 3 weeks and then decreased to 30 mg/d around 1/20/24 and continued on this when he was initially seen in IBD clinic 2/6/24 at which time he was having 4-5 soft to formed BMs/d. Since first week of Jan 2024 his symptoms started improving. Patient was started on humira 40 mg every 2 weeks on 3/20/2024 and he continued prednisone taper. MRI pelvis on 3/11/24 was significant for left intersphincteric perianal fistula and rectal inflammation. In late 3/2024 he was started on canasa suppositories and we continued the prednisone taper. In 6/14/24, elected to start Rinvoq. Pt induced with  oral prednisone 6/14-8/6/24, 8/29/24-11/12/24 and had extended course of high dose rinvoq 45 mg daily for 6 mos (6/14/24-12/17/24) but remained steroids dependent though steroids effective. In end 8/2024 due to some purulent perianal drainage from fistula we started 2 week course of cipro/flagyl which was effective. Stool calprotectin 6/7/24 1400, 8/16/24 1370 and 10/18/24 176. When diarrhea returned we proceeded with repeat stool studies to rule out infection and colonoscopy significant for fair bowel prep but moderate to severe colitis in the left colon with 2 small localized areas of erythema in ascending colon/appendiceal orifice (bx neg CMV). We restarted induction prednisone 12/18/24 and proceeded with starting inflectra and imuran on 12/26/2024. IFX trough level at week 2 was undetectable without Ab therefore we optimized his dose. He received 10 mg/kg dose for 3rd induction dose on 2/7/25 and was was able to completely taper off prednisone by 2/11/2025. We attempted to get infliximab trough levels at week 6, but they were mishandled at the lab. Infliximab levels 4 weeks after completing all three induction doses (week 10) were 9.4 without Ab, therefore, we proceeded to further optimize his maintenance dose to 10 mg/kg q 4 weeks which he started on 4/18/2025.     Interval History:  - current IBD meds:  infliximab 10mg/kg every 4 weeks (started 5mg/kg on 12/26/24, 2nd dose 1/9/25, optimized to 10mg/kg for 3rd dose on 2/7/24, dose due on 3/21 delayed to 3/28 due to being out of town, optimized to 10mg/kg Q4W and LD 4/18/25, ND 5/16) + imuran 100 mg/d (started 12/26/24)   - other meds: reports taking prednisone 20mg then self tapering to 10 the next day on 2 occasions in 3/2025 and 4/2025 after seeing some blood in the stool.   - 3-4 formed BM/day, some straining in the AM; no blood; no mucus; good control; no urgency; no abd pain   - stool calprotectin: 2/9/24 104, 6/7/24 1400,  8/16/24 1370, 10/18/24 176,  "24 >8,000  - diet: has eliminated all sugars from his diet which helps significantly   - denies significant weight fluctuations   - IFX trough levels before 2nd dose (25) were undetectable without Ab  - IFX trough levels at week 6 were missed  - IFX trough levels 4 weeks after third induction (week 10) were 9.4 without Ab  - 25 - colonoscopy: From anal verge to mid transverse colon there is moderate inflammation characterized by erythema with congestion and linear ulcerations and multiple inflammatory polyps though all not sampled. Cluster of polyps in  the descending colon in setting of inflammation. Scope aborted in transverse colon due to looping and risk of perforation. Biopsies pending.  - no recurrent skin issues recently, sees local derm   - former smoker- discontinued 2024, on nicotine patches     Past Medical History[1]    Prior Pertinent Surgeries:   None     Last pertinent Endoscopy/Imagin23 colonoscopy:  inflammation with congestion, erosions, friability ad mucus in a contiuous and circumferential pattern from anus to sigmoid c/w moderate inflammation (hernandez score 3). Scope not complete due to degree of inflammation. Biopsies showed rectum and sigmoid with moderately active inflammation with ulceration, CMV negative. On colonoscopy there was fistula with palpable lump "peanut" size with drainage.  3/11/24 MRI pelvis: Left intersphincteric perianal fistula at the left lateral approximate 4 o'clock position, measuring approximately 3.8 cm in length.  One suspected point of exit about the left gluteal fold.  Additional suspected blind-ending component about the left gluteal soft tissues.  Suspected granulation tissue about the visualized perianal fistula.  Diffuse rectal mucosal hyperenhancement and wall thickening, as well as inflammatory change about the perirectal fat and multiple prominent perirectal lymph nodes.Prostatomegaly.  Possible right-sided varicocele.    12/3/24 " "Colonoscopy: Hemorrhoids found on perianal exam. Preparation of the colon was fair. Moderate to severe inflammation in the rectum/sigmoid and descending with 2 small localized areas of erythem in the ascending colon and appendiceal orifice. Biopsied.Two areas of abnormal mucosa in the sigmoid and cecum to assess for dysplasia. Biopsies confirmed active inflammation.   5/13/25 Colonoscopy: From anal verge to mid transverse colon there is moderate inflammation characterized by erythema with congestion and linear ulcerations and multiple inflammatory polyps though all not sampled. Cluster of polyps in  the descending colon in setting of inflammation. Scope aborted in transverse colon due to looping and risk of perforation. Biopsies pending.     Therapeutic Drug Monitoring Labs:  5/29/24 trough ADA<0.6 with Ab 432 (on humira 40mg Q2W)  1/9/25 trough IFX <1.0/Abs neg (2 weeks after 1st dose of IFX 5 mg/kg)  3/7/25 trough IFX level 9.4/ neg Ab (week 10) (4 weeks after completing induction doses with 5mg/kg on week 0, 2 and 10mg/kg on week 6)     Prior IBD Therapies:  Oral mesalamine (unclear which one but seems like apriso)- ineffective but only took for 4 weeks  lialda 3.6 g/d   Entocort 9 mg/d- ineffective  Prednisone - effective (losses response in doses 15 mg or less)  Methotrexate - d/c due to elevated ALT  Stelara 90mg SC every 8 weeks (4/12/23 - 12-6/23) - d/c due to rash which resolved after stopping the medication  Humira (3/20/24 - 5/29/24) - d/c due to undetectable levels and high antibodies   Rinvoq 45 mg po daily (6/14/24-12/17/24)- ineffective    Vaccinations:  No results found for: "HEPBSAB"    Lab Results   Component Value Date    HEPBSURFABQU Negative 02/06/2024    HEPBSURFABQU <3 02/06/2024     Lab Results   Component Value Date    HEPAIGG Non-reactive 02/06/2024     Lab Results   Component Value Date    VARICELLAZOS 1030.00 02/06/2024    VARICELLAINT Positive 02/06/2024     Lab Results   Component Value " Date    MUMPSIGGSCRE <5.00 02/06/2024    MUMPSIGGINTE Negative 02/06/2024      Lab Results   Component Value Date    RUBEOLAIGGAN 64.20 02/06/2024    RUBEOLAINTER Positive 02/06/2024     Immunization History   Administered Date(s) Administered    COVID-19, MRNA, LN-S, PF (Pfizer) (Purple Cap) 01/12/2022    Pneumococcal Conjugate - 20 Valent 06/14/2024    Zoster Recombinant 06/14/2024, 12/03/2024     Flu shot: recommended yearly,   COVID vaccine/booster:  per CDC recommendations  RSV: after age 49 yo if high risk  Tetanus (Tdap):  last tetanus was over 10 years ago.   HPV: NA     Meningococcal: NA  Hepatitis B: defer to future in person visit  Hepatitis A: defer to future in person visit  MMR (live vaccine): not immune to mumps, immune to rubella        Review of Systems: see pertinent review of systems above    Medications Ordered Prior to Encounter[2]    Physical Examination  There were no vitals taken for this visit.     Labs:   Lab Results   Component Value Date    CRP <5.0 02/06/2025    CALPROTECTIN >8,000.0 (H) 12/17/2024     Lab Results   Component Value Date    HEPBSAG Non-reactive 12/17/2024    HEPBCAB Non-reactive 12/17/2024   ,   Lab Results   Component Value Date    TBGOLDPLUS Negative 12/17/2024     Lab Results   Component Value Date    [id number] 23 (L) 02/06/2024    UNJZRDNF35 348 02/06/2024     Lab Results   Component Value Date    WBC 12.90 (H) 02/06/2025    HGB 12.1 (L) 02/06/2025    HCT 38.5 (L) 02/06/2025    MCV 78 (L) 02/06/2025     02/06/2025     Lab Results   Component Value Date    CREATININE 1.15 02/06/2025    ALBUMIN 3.9 02/06/2025    BILITOT 0.4 02/06/2025    ALKPHOS 63 02/06/2025    AST 27 02/06/2025    ALT 30 02/06/2025       Assessment/Plan:  Dharmesh Lozoya is a 50 y.o. male with Crohn's disease (pan-colonic, perianal fistula).    Patient is doing well clinically on infliximab 10mg/kg Q4W and azathioprine 100mg/d. Recent colonoscopy from yesterday showed moderate inflammation with  ulceration, though improved compared to previous scope in 12/2024. Pending biopsy results will check thiopurine metabolites with the safety labs this week and consider optimization of imuran dose. Considering he is doing well clinically and slowly improving endoscopically and has only been on the maximized dose of infliximab for a month will continue current treatment. Plan to check infliximab trough level with the infusion in June and repeat a stool calprotectin then to reassess disease activity.     # Crohn's disease (pan-colonic, perianal fistula):    - continue infliximab 10mg/kg every 4 weeks   - continue imuran 100 mg/d - consider optimizing based on thiopurine levels  - diet: avoid sugars including artificial sweeteners   - stool calprotectin 6/2025   - colonoscopy - TBD on clinical course - miralax with gatorade  - basic labs: thiopurine metabolites, CBC, CMP, Vit D  - drug monitoring labs: CBC/CMP q3mo (5/2025), TPMT (normal 2/2024), TB quantiferon (12/2025), Hep B testing (12/2025)  - TDM: IFX trough level with third optimized dose on 6/13/25     # Prostate- MRI pelvis showed prostatomegaly and possible right sided varicocele  - will consider urology referral at later date      # Immunodeficiency due to long term immunosuppressive drug therapy (infliximab and azathioprine) and IBD specific health maintenance:  CRC risk- RFs-sx 2015, pancolonic, surveillance colonoscopy once in remission  Skin exam yearly - saw local dermatologist 2/2025, normal exam  Risk for osteopenia/osteoporosis-long term use of prednisone, no longer taking calcium/vit D  Vitamin D- stopped taking Vit D supplementation 1 month ago. Will repeat vit D levels  Lab Results   Component Value Date    [id number] 23 (L) 02/06/2024   Vaccines- no live vaccines, advised patient about seasonal vaccines including flu, covid booster.     I personally examined the patient and discussed above plan in collaboration with Janeen Lomas, KristenD.       Visit today is associated with current or anticipated ongoing medical care related to this patient's single serious condition/complex condition Crohn's disease.     Follow up in about 6 weeks (around 6/25/2025) for In Person.    Audio Only Telehealth Visit     The patient location is: car  The chief complaint leading to consultation is: Crohn's disease  Visit type: Virtual visit with audio only (telephone)  Total time spent in medical discussion with patient: 15 minutes  Total time spent on date of the encounter:30 minutes    The reason for the audio only service rather than synchronous audio and video virtual visit was related to technical difficulties or patient preference/necessity.    Each patient to whom I provide medical services by telemedicine is:  (1) informed of the relationship between the physician and patient and the respective role of any other health care provider with respect to management of the patient; and (2) notified that they may decline to receive medical services by telemedicine and may withdraw from such care at any time. Patient verbally consented to receive this service via voice-only telephone call.    Tianna Bhat MD, FACG, [facility]  Department of Gastroenterology  Medical Director, Inflammatory Bowel Disease               [1]   Past Medical History:  Diagnosis Date    Crohn's disease     Recurrent Clostridioides difficile infection    [2]   Current Outpatient Medications on File Prior to Visit   Medication Sig Dispense Refill    azaTHIOprine (IMURAN) 50 mg Tab Take 2 tablets (100 mg total) by mouth once daily. 60 tablet 1    infliximab-dyyb (INFLECTRA IV) Inject 10 mg/kg into the vein every 4 (four) hours.      CALCIUM-MAGNESIUM-ZINC ORAL Take by mouth. Dosing unknown (Patient not taking: Reported on 5/14/2025)      cholecalciferol, vitamin D3, (VITAMIN D3) 50 mcg (2,000 unit) Cap capsule Take 2,000 Units by mouth once a week. (Patient not taking: Reported on 5/14/2025)       [DISCONTINUED] infliximab-dyyb (INFLECTRA IV) Inject 5 mg/kg into the vein.      [DISCONTINUED] predniSONE (DELTASONE) 10 MG tablet Take 2.5 tablets (25 mg total) by mouth once daily. Taper per Dr. Bhat's instruction 90 tablet 0     Current Facility-Administered Medications on File Prior to Visit   Medication Dose Route Frequency Provider Last Rate Last Admin    [DISCONTINUED] 0.9% NaCl infusion   Intravenous Continuous Tianna Bhat MD        [DISCONTINUED] LIDOcaine (cardiac) injection   Intravenous PRN Lindsay Harris CRNA   60 mg at 05/13/25 1427    [DISCONTINUED] propofol (DIPRIVAN) 10 mg/mL infusion   Intravenous Continuous PRN Lindsay Harris CRNA   Stopped at 05/13/25 1441    [DISCONTINUED] sodium chloride 0.9% infusion   Intravenous Continuous PRN Lindsay Harris CRNA   Stopped at 05/13/25 1441

## 2025-05-14 NOTE — PATIENT INSTRUCTIONS
- continue infliximab 10mg/kg every 4 weeks  - continue imuran 100 mg daily  - will send new script   - labs Friday (CBC, CMP, Vit D, thiopurine metabolites) at [facility]   -  stool kit at [facility] this week, but do not need to turn it in until June before your next infusion  - labs (infliximab drug levels) before your June infusion (6/13) [facility] go to the [facility]   - do not take prednisone without letting us know first

## 2025-05-16 LAB
ESTROGEN SERPL-MCNC: NORMAL PG/ML
INSULIN SERPL-ACNC: NORMAL U[IU]/ML
LAB AP CLINICAL INFORMATION: NORMAL
LAB AP GROSS DESCRIPTION: NORMAL
LAB AP PERFORMING LOCATION(S): NORMAL
LAB AP REPORT FOOTNOTES: NORMAL
T3RU NFR SERPL: NORMAL %

## 2025-05-19 ENCOUNTER — RESULTS FOLLOW-UP (OUTPATIENT)
Dept: GASTROENTEROLOGY | Facility: CLINIC | Age: 50
End: 2025-05-19

## 2025-05-23 ENCOUNTER — TELEPHONE (OUTPATIENT)
Dept: GASTROENTEROLOGY | Facility: CLINIC | Age: 50
End: 2025-05-23
Payer: COMMERCIAL

## 2025-05-23 NOTE — TELEPHONE ENCOUNTER
Tab Barroso  - notified that I tried to call him after his appointment with Dr. Bhat 5/14 and he did not answer, PM sent with lab orders (CBC, CMP, vitamin D, thiopurine levels)   -informed that I sent him another portal message with an IFX level lab order that needs to be drawn the day before or day of his infusion before he gets his infusion, pt v/u (orders for IFX and stool ra)

## 2025-06-02 ENCOUNTER — TELEPHONE (OUTPATIENT)
Dept: GASTROENTEROLOGY | Facility: CLINIC | Age: 50
End: 2025-06-02
Payer: COMMERCIAL

## 2025-06-09 ENCOUNTER — RESULTS FOLLOW-UP (OUTPATIENT)
Dept: GASTROENTEROLOGY | Facility: CLINIC | Age: 50
End: 2025-06-09

## 2025-06-25 ENCOUNTER — TELEPHONE (OUTPATIENT)
Dept: GASTROENTEROLOGY | Facility: CLINIC | Age: 50
End: 2025-06-25
Payer: COMMERCIAL

## 2025-06-25 NOTE — TELEPHONE ENCOUNTER
Called and spoke with the patient  - Requesting to change appointment to virtual appointment  - Advised will send a message to Dr. Bhat

## 2025-06-25 NOTE — TELEPHONE ENCOUNTER
Copied from CRM #[id number]. Topic: Appointments - Appointment Rescheduling  >> Jun 25, 2025 11:21 AM Theresa wrote:  Patient calling to change visit to virtual visit. Pls call

## 2025-06-26 ENCOUNTER — LAB VISIT (OUTPATIENT)
Dept: LAB | Facility: HOSPITAL | Age: 50
End: 2025-06-26
Attending: INTERNAL MEDICINE
Payer: COMMERCIAL

## 2025-06-26 ENCOUNTER — TELEPHONE (OUTPATIENT)
Dept: GASTROENTEROLOGY | Facility: CLINIC | Age: 50
End: 2025-06-26
Payer: COMMERCIAL

## 2025-06-26 ENCOUNTER — OFFICE VISIT (OUTPATIENT)
Dept: GASTROENTEROLOGY | Facility: CLINIC | Age: 50
End: 2025-06-26
Payer: COMMERCIAL

## 2025-06-26 VITALS
SYSTOLIC BLOOD PRESSURE: 135 MMHG | WEIGHT: 235.44 LBS | TEMPERATURE: 98 F | HEART RATE: 84 BPM | DIASTOLIC BLOOD PRESSURE: 96 MMHG | HEIGHT: 74 IN | BODY MASS INDEX: 30.21 KG/M2

## 2025-06-26 DIAGNOSIS — D84.821 IMMUNODEFICIENCY DUE TO LONG TERM IMMUNOSUPPRESSIVE DRUG THERAPY: ICD-10-CM

## 2025-06-26 DIAGNOSIS — K50.10 CROHN'S DISEASE OF COLON WITHOUT COMPLICATION: Primary | ICD-10-CM

## 2025-06-26 DIAGNOSIS — Z51.81 THERAPEUTIC DRUG MONITORING: ICD-10-CM

## 2025-06-26 DIAGNOSIS — Z79.60 IMMUNODEFICIENCY DUE TO LONG TERM IMMUNOSUPPRESSIVE DRUG THERAPY: ICD-10-CM

## 2025-06-26 DIAGNOSIS — T45.1X5A IMMUNODEFICIENCY DUE TO LONG TERM IMMUNOSUPPRESSIVE DRUG THERAPY: ICD-10-CM

## 2025-06-26 DIAGNOSIS — K50.10 CROHN'S DISEASE OF COLON WITHOUT COMPLICATION: ICD-10-CM

## 2025-06-26 PROCEDURE — 80299 QUANTITATIVE ASSAY DRUG: CPT

## 2025-06-26 PROCEDURE — 99999 PR PBB SHADOW E&M-EST. PATIENT-LVL III: CPT | Mod: PBBFAC,,, | Performed by: INTERNAL MEDICINE

## 2025-06-26 PROCEDURE — 83993 ASSAY FOR CALPROTECTIN FECAL: CPT

## 2025-06-26 PROCEDURE — 36415 COLL VENOUS BLD VENIPUNCTURE: CPT

## 2025-06-26 NOTE — TELEPHONE ENCOUNTER
- Discussed VV with Martin Vernon  - The patient does need to come in person for his clinic visit    Called and spoke with the patient  - Advised the patient he will need to be seen in-clinic  - Patient verbalized understanding

## 2025-06-26 NOTE — PATIENT INSTRUCTIONS
- labs today: Pro metabolites - thiopurine   [facility]   - continue imuran 2 tabs a day  - continue infliximab 10mg/kg every 4 weeks   - turn in stool calprotectin today   - look into evinature supplement online - could help as a supplement in addition to current treatment    - get tetanus shot at local pharmacy   - hepatitis A and B today; second hep B in 1 month and second hep A in 6-12 months.      Contact us by sending a [employer] message or by calling [phone] to report the following:  - Changes in bowel symptoms  - Symptoms of infection including fever of 100.0 F or higher  - Antibiotics prescribed  - New medical diagnosis or new medication  - If you are planning on having surgery since we may need to tailor your meds based on surgery timing

## 2025-06-26 NOTE — TELEPHONE ENCOUNTER
Called and spoke with [facility] personnel  - 6/6/25 labs drawn CBC, CMP, Vitamin D. No pro-metabolites drawn.  - 3/7/25 last IFX level drawn on [facility] records  - 6/13/25 - no IFX level, stool calprotectin

## 2025-06-26 NOTE — PROGRESS NOTES
"     [facility]             Inflammatory Bowel Disease   Follow-up Note              TODAY'S VISIT DATE:  6/26/2025    Chief Complaint:   Chief Complaint   Patient presents with    Follow-up    Crohn's Disease     PCP: Unable, To Obtain    Previous History:  Dharmesh Lozoya is a 50 y.o. male with Crohn's disease (pancolonic, perianal fistula). Patient was doing well until 2015 and developed blood in his stools and saw family medicine doctor in [place] and had a flex sig and diagnosed "colitis." He then had a colonoscopy by a GI doctor in [place] and recalls being told distal colitis and started on medication that was too expensive and took for 30 days (sounds like apriso) but too expensive and not effective so discontinued. At that time he did not have insurance so he proceeded with dietary changes (more fish, turmeric). Within a few weeks symptoms resolved and back to baseline normal BMs 4-5 variable consistency but no blood and able to go back to normal lifestyle. In 2017 he moved from [place] to [place] and in 12/2022 began with worsening bloody diarrhea, cramping and weight loss. He was diagnosed with C diff and vancomycin started which helped significant with his symptoms. His symptoms went from 10-15 BMs/d to 8-12 BMs/d after oral vancomycin. 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 He was then placed on lialda 3.6 g/d with entocort 9 mg/d (decreased to 6 mg/d 3/2023) which he took for 1 month with some response and then discontinued due to ineffectiveness. He was hospitalized 3/2023 at [facility] for 1 days due to significant bloody diarrhea with weakness and dehydration and he had ran out of entocort a week before " and was off of lialda by that time. He was discharged home on prednisone 40 mg daily for 2 weeks and advised to f/u with his GI doctor. In 3/2023 he noticed abscess which spontaneously drained and also at that time had issues with hemorrhoids He then started stelara 4/12/23. In 5/2023 pt had good response in regards to bleeding and stool frequency and at that time C diff recurrent and treated with vancomycin x 10 days. Approximately about 6 weeks after starting IV stelara noticed initially small pruritic blisters between toes and then progressed to head, arms, legs, feet, toes. These lesions were treated symptomatically with hydroxyzine and he took athlete's foot powder and then eventually got topical cream for horses that helped (antifungal/antibacterial). Diet he found if dairy, spicy foods, red sauce exacerbated symptoms. Colonoscopy 12/29/23 significant for inflammation with congestion, erosions, friability and mucus in a contiuous and circumferential pattern from anus to sigmoid c/w moderate inflammation (hernandez score 3)-bx c/w moderate active inflammation, CMV neg. Perianal exam at time of scope c/w palpable lump c/w fistula with drainage. He discontinued stelara 12/6/23 due to rash which resolved after discontinutaiton. In 12/2023 pt started on oral prednisone 40 mg daily for 3 weeks and then decreased to 30 mg/d around 1/20/24 and continued on this when he was initially seen in IBD clinic 2/6/24 at which time he was having 4-5 soft to formed BMs/d. Since first week of Jan 2024 his symptoms started improving. Patient was started on humira 40 mg every 2 weeks on 3/20/2024 and he continued prednisone taper. MRI pelvis on 3/11/24 was significant for left intersphincteric perianal fistula and rectal inflammation. In late 3/2024 he was started on canasa suppositories and we continued the prednisone taper. In 6/14/24, elected to start Rinvoq. Pt induced with oral prednisone 6/14-8/6/24, 8/29/24-11/12/24 and had  extended course of high dose rinvoq 45 mg daily for 6 mos (6/14/24-12/17/24) but remained steroids dependent though steroids effective. In end 8/2024 due to some purulent perianal drainage from fistula we started 2 week course of cipro/flagyl which was effective. Stool calprotectin 6/7/24 1400, 8/16/24 1370 and 10/18/24 176. When diarrhea returned we proceeded with repeat stool studies to rule out infection and colonoscopy significant for fair bowel prep but moderate to severe colitis in the left colon with 2 small localized areas of erythema in ascending colon/appendiceal orifice (bx neg CMV). We restarted induction prednisone 12/18/24 and proceeded with starting inflectra and imuran on 12/26/2024. IFX trough level at week 2 was undetectable without Ab therefore we optimized his dose. He received 10 mg/kg dose for 3rd induction dose on 2/7/25 and was was able to completely taper off prednisone by 2/11/2025. We attempted to get infliximab trough levels at week 6, but they were mishandled at the lab. Infliximab levels 4 weeks after completing all three induction doses (week 10) were 9.4 without Ab, therefore, we proceeded to further optimize his maintenance dose to 10 mg/kg q 4 weeks which he started on 4/18/2025. A colonoscopy from 5/14/2025 showed moderate inflammation with ulceration, though improved compared to previous scope in 12/2024. Biopsies confirmed scope findings and polyp removed was inflammatory. Since pt had only been on max dose of IFX for 1 month prior to scope and was clinically stable we decided to continue infliximab 10mg/kg Q4W and azathioprine 100mg/d with plans to reassess thiopurine levels and stool calprotectin.     Interval History:  - current IBD meds:  infliximab 10mg/kg every 4 weeks (started 5mg/kg on 12/26/24, 2nd dose 1/9/25, optimized to 10mg/kg for 3rd dose on 2/7/24, dose due on 3/21 delayed to 3/28 due to being out of town, optimized to 10mg/kg Q4W on 4/18/25, LD 6/13, ND 7/11) +  imuran 100 mg/d (started 12/26/24)   - diet: meats, vegetables, and bread; re introduced some sugars to his diet, which he has shown to be very sensitive to in the past.   - stool calprotectin: 2/9/24 104, 6/7/24 1400,  8/16/24 1370, 10/18/24 176, 12/17/24 >8,000  - 5-7 liquid to soft BM a day (first BM of the day is more liquid then becomes soft); 1 of them is nocturnal (was nightly, but improved now to 3x/week); morning BM with occasional blood clot separate from the stool; no abd pain, but bloated (left side); urgency unchanged (has good control);   - after scope symptoms worsen initially, but slowly improving now including consistency, urgency, nocturnal frequency and blood with BM   - drug levels and stool ra labs note done   - no recurrent skin issues, sees local derm   - former smoker- stopped 2009 and was using chewing tobacco - discontinued 8/2024, on nicotine pouches; two weeks ago stopped the pouches and restarted chewing tobacco (3x/day)    - right shin has a bruise from hitting a lamp 6 weeks ago that is healing very slow.     Past Medical History[1]    Prior Pertinent Surgeries:   None     Last pertinent Endoscopy/Imaging:  3/11/24 MRI pelvis: Left intersphincteric perianal fistula at the left lateral approximate 4 o'clock position, measuring approximately 3.8 cm in length.  One suspected point of exit about the left gluteal fold.  Additional suspected blind-ending component about the left gluteal soft tissues.  Suspected granulation tissue about the visualized perianal fistula.  Diffuse rectal mucosal hyperenhancement and wall thickening, as well as inflammatory change about the perirectal fat and multiple prominent perirectal lymph nodes.Prostatomegaly.  Possible right-sided varicocele.    5/13/25 Colonoscopy: From anal verge to mid transverse colon there is moderate inflammation characterized by erythema with congestion and linear ulcerations and multiple inflammatory polyps though all not sampled.  Cluster of polyps in  the descending colon in setting of inflammation. Scope aborted in transverse colon due to looping and risk of perforation. Biopsies confirmed inflammation seen on scope and polyp was inflammatory. No dysplasia.     Therapeutic Drug Monitoring Labs:  5/29/24 trough ADA<0.6 with Ab 432 (on humira 40mg Q2W)  1/9/25 trough IFX <1.0/Abs neg (2 weeks after 1st dose of IFX 5 mg/kg)  3/7/25 trough IFX level 9.4/ neg Ab (week 10) (4 weeks after completing induction doses with 5mg/kg on week 0, 2 and 10mg/kg on week 6)     Prior IBD Therapies:  Oral mesalamine (unclear which one but seems like apriso)- ineffective but only took for 4 weeks  lialda 3.6 g/d   Entocort 9 mg/d- ineffective  Prednisone - effective (losses response in doses 15 mg or less)  Methotrexate - d/c due to elevated ALT  Stelara 90mg SC every 8 weeks (4/12/23 - 12-6/23) - d/c due to rash which resolved after stopping the medication  Humira (3/20/24 - 5/29/24) - d/c due to undetectable levels and high antibodies   Rinvoq 45 mg po daily (6/14/24-12/17/24)- ineffective    Vaccinations:  Lab Results   Component Value Date    HEPBSURFABQU Negative 02/06/2024    HEPBSURFABQU <3 02/06/2024     Lab Results   Component Value Date    HEPAIGG Non-reactive 02/06/2024     Lab Results   Component Value Date    VARICELLAZOS 1030.00 02/06/2024    VARICELLAINT Positive 02/06/2024     Lab Results   Component Value Date    MUMPSIGGSCRE <5.00 02/06/2024    MUMPSIGGINTE Negative 02/06/2024      Lab Results   Component Value Date    RUBEOLAIGGAN 64.20 02/06/2024    RUBEOLAINTER Positive 02/06/2024     Immunization History   Administered Date(s) Administered    COVID-19, MRNA, LN-S, PF (Pfizer) (Purple Cap) 01/12/2022    Hepatitis A, Adult 06/26/2025    Hepatitis B (recombinant) Adjuvanted, 2 dose 06/26/2025    Pneumococcal Conjugate - 20 Valent 06/14/2024    Zoster Recombinant 06/14/2024, 12/03/2024   Flu shot: recommended yearly,   COVID vaccine/booster:   "per CDC recommendations  RSV: after age 59 yo if high risk  Tetanus (Tdap):  last tetanus was over 10 years ago. Discussed and recommended to get at his local pharmacy   HPV: NA     Meningococcal: NA  Hepatitis B: discussed and recommended 2 doses of heplisav B 1 month apart. Administered first dose today   Hepatitis A: discussed and recommended 2 doses of Havrix 6-12 months apart. Administered first dose today   MMR (live vaccine): not immune to mumps, immune to rubella      Review of Systems: see pertinent review of systems above    Medications Ordered Prior to Encounter[2]    Physical Examination  BP (!) 135/96 (BP Location: Right arm, Patient Position: Sitting)   Pulse 84   Temp 98.1 °F (36.7 °C) (Oral)   Ht 6' 2" (1.88 m)   Wt 106.8 kg (235 lb 7.2 oz)   BMI 30.23 kg/m²    Constitutional: well developed, no cough, no dyspnea, alert, and no acute distress    Head: Normocephalic, no lesions, without obvious abnormality  Eye: Normal external eye, conjunctiva, and lids  Cardiovascular: regular rate and regular rhythm  Respiratory: normal air entry, CTA B  Gastrointestinal: soft, non-tender, non-distended, normal BS  Psychiatric: appropriate, normal mood    Labs:   Lab Results   Component Value Date    CRP <5.0 02/06/2025    CALPROTECTIN >8,000.0 (H) 12/17/2024     Lab Results   Component Value Date    HEPBSAG Non-reactive 12/17/2024    HEPBCAB Non-reactive 12/17/2024   ,   Lab Results   Component Value Date    TBGOLDPLUS Negative 12/17/2024     Lab Results   Component Value Date    QKCEPBYK70IM 26.9 (L) 06/06/2025    RORZONDM52 348 02/06/2024     Lab Results   Component Value Date    WBC 10.7 06/06/2025    HGB 12.1 (L) 06/06/2025    HCT 40.9 06/06/2025    MCV 80 06/06/2025     06/06/2025     Lab Results   Component Value Date    CREATININE 1.20 06/06/2025    ALBUMIN 3.9 (L) 06/06/2025    BILITOT 0.2 06/06/2025    ALKPHOS 63 02/06/2025    AST 24 06/06/2025    ALT 18 06/06/2025     Assessment/Plan:  Dharmesh" Darell is a 50 y.o. male with with Crohn's disease (pan-colonic, perianal fistula).    Patient currently on infliximab 10mg/kg q4W and azathioprine 100mg/d. Recent worsening of the symptoms post scope could be related to prep and colonoscopy as well as reintroducing sugars into his diet. Biopsies and colonoscopy from 5/2025 showed moderate inflammation with ulceration, though improved compared to previous scope in 12/2024. Since labs were missed by [facility], will proceed with a thiopurine level today to assess if we can further optimize azathioprine dose. Additionally, will repeat a stool calprotectin now to assess disease activity and repeat an infliximab trough before next dose.      # Crohn's disease (pan-colonic, perianal fistula):    - continue infliximab 10mg/kg every 4 weeks   - continue imuran 100 mg/d - consider optimizing based on thiopurine levels  - diet: avoid sugars including artificial sweeteners   - stool calprotectin now  - colonoscopy - TBD on clinical course - miralax with gatorade  - drug monitoring labs: CBC/CMP q3mo (9/2025), TPMT (normal 2/2024), TB quantiferon (12/2025), Hep B testing (12/2025)  - TDM: thiopurine metabolites today and IFX trough level before ND 7/11/25     # Prostate- MRI pelvis showed prostatomegaly and possible right sided varicocele  - will consider urology referral at later date      # Immunodeficiency due to long term immunosuppressive drug therapy (infliximab and azathioprine) and IBD specific health maintenance:  CRC risk- RFs-sx 2015, pancolonic, surveillance colonoscopy once in remission  Skin exam yearly - saw local dermatologist 2/2025, normal exam  Risk for osteopenia/osteoporosis-long term use of prednisone, no longer taking calcium/vit D  Vitamin D- last Vit D low after non adherence to supplementation. Restarted vit D recently. Continue vitamin D 2000 IU daily   Lab Results   Component Value Date    ZCKNYIAS03DR 26.9 (L) 06/06/2025   Vaccines- no live vaccines,  first hep A and hep B today; second hep B in a month and second hep A in 6-12 months at a future visit.      I personally examined the patient and discussed above plan in collaboration with Kristen LeaD.      Visit today is associated with current or anticipated ongoing medical care related to this patient's single serious condition/complex condition - Crohn's disease (pan-colonic, perianal fistula).     Follow up in about 2 months (around 8/26/2025) for in person .    Tianna Bhat MD, FACG, [facility]  Department of Gastroenterology  Medical Director, Inflammatory Bowel Disease                    [1]   Past Medical History:  Diagnosis Date    Crohn's disease     Hx of pancreatitis     Recurrent Clostridioides difficile infection    [2]   Current Outpatient Medications on File Prior to Visit   Medication Sig Dispense Refill    azaTHIOprine (IMURAN) 50 mg Tab Take 2 tablets (100 mg total) by mouth once daily. 60 tablet 0    CALCIUM-MAGNESIUM-ZINC ORAL Take by mouth. Dosing unknown      cholecalciferol, vitamin D3, (VITAMIN D3) 50 mcg (2,000 unit) Cap capsule Take 2,000 Units by mouth once a week.      infliximab-dyyb (INFLECTRA IV) Inject 10 mg/kg into the vein every 28 days.       No current facility-administered medications on file prior to visit.

## 2025-06-27 ENCOUNTER — RESULTS FOLLOW-UP (OUTPATIENT)
Dept: GASTROENTEROLOGY | Facility: CLINIC | Age: 50
End: 2025-06-27
Payer: COMMERCIAL

## 2025-06-27 LAB
CALPROTECTIN INTERP (OHS): ABNORMAL
CALPROTECTIN STOOL (OHS): 7830 ΜG/G

## 2025-07-01 ENCOUNTER — PATIENT MESSAGE (OUTPATIENT)
Dept: GASTROENTEROLOGY | Facility: CLINIC | Age: 50
End: 2025-07-01
Payer: COMMERCIAL

## 2025-07-01 LAB
6-TGN ENTSUB RBC: 109 PMOL/8X10(8)RBC (ref 235–450)
6MMP ENTSUB RBC: <307 PMOL/8X10(8)RBC

## 2025-07-03 ENCOUNTER — TELEPHONE (OUTPATIENT)
Dept: GASTROENTEROLOGY | Facility: CLINIC | Age: 50
End: 2025-07-03
Payer: COMMERCIAL

## 2025-07-03 DIAGNOSIS — K50.10 CROHN'S DISEASE OF COLON WITHOUT COMPLICATION: ICD-10-CM

## 2025-07-03 RX ORDER — AZATHIOPRINE 50 MG/1
250 TABLET ORAL DAILY
Qty: 150 TABLET | Refills: 0 | Status: SHIPPED | OUTPATIENT
Start: 2025-07-03

## 2025-07-03 NOTE — TELEPHONE ENCOUNTER
Called and spoke with pt  - discussed plan of care below  - prefers to keep 50mg tabs and okay to take 5 tabs daily   - verbalized understanding instructions and plans to start new dose this weekend  - script for azathioprine 250mg PO daily sent to preferred pharmacy   - will notify staff to align CBC, CMP, thiopurine pro metabolites accordingly       Plan of care:   - low 6TG and 6MMP allowing room for dose optimization  - currently on infliximab 10mg/kg every 4 weeks (started 5mg/kg on 12/26/24, 2nd dose 1/9/25, optimized to 10mg/kg for 3rd dose on 2/7/24, dose due on 3/21 delayed to 3/28 due to being out of town, optimized to 10mg/kg Q4W on 4/18/25, LD 6/13, ND 7/11) + imuran 100 mg/d (started 12/26/24)   - since pt continues to have some ongoing inflammation and IFX dose is maximized, optimization of AZA dose is warranted   - per most recent CBC, WBC normal  - calculated AZA dose (2 - 2.5mg/kg)  based on current pt weight is between 213 - 267 mg therefore will proceed with increasing AZA dose from 100mg to 250mg PO daily   - repeat CBC CMP at week 4, 8 then every 3 month  - repeat thiopurine pro metabolites at week 8 with other labs   - case discussed with Dr. Bhat

## 2025-07-03 NOTE — TELEPHONE ENCOUNTER
----- Message from STEPHEN Neely sent at 7/2/2025  9:24 AM CDT -----    ----- Message -----  From: Lab, Background User  Sent: 6/27/2025   3:31 PM CDT  To: Tianna Bhat MD

## 2025-07-07 ENCOUNTER — PATIENT MESSAGE (OUTPATIENT)
Dept: GASTROENTEROLOGY | Facility: CLINIC | Age: 50
End: 2025-07-07
Payer: COMMERCIAL

## 2025-07-10 ENCOUNTER — TELEPHONE (OUTPATIENT)
Dept: GASTROENTEROLOGY | Facility: CLINIC | Age: 50
End: 2025-07-10
Payer: COMMERCIAL

## 2025-07-10 DIAGNOSIS — K50.10 CROHN'S DISEASE OF COLON WITHOUT COMPLICATION: Primary | ICD-10-CM

## 2025-07-10 DIAGNOSIS — D84.821 IMMUNODEFICIENCY DUE TO LONG TERM IMMUNOSUPPRESSIVE DRUG THERAPY: ICD-10-CM

## 2025-07-10 DIAGNOSIS — T45.1X5A IMMUNODEFICIENCY DUE TO LONG TERM IMMUNOSUPPRESSIVE DRUG THERAPY: ICD-10-CM

## 2025-07-10 DIAGNOSIS — Z79.60 IMMUNODEFICIENCY DUE TO LONG TERM IMMUNOSUPPRESSIVE DRUG THERAPY: ICD-10-CM

## 2025-07-10 NOTE — TELEPHONE ENCOUNTER
" 7/10/25  1:25 PM  I have a problem. Jo had two flares in two days now. Back to back. Should I get back on steroids? My BM are pure liquid with blood.   (Clarified "flare",  pt calls BMs with urgency and blood a flare)    Reason for Call: Change in condition bloody liquid stool   Contact: Called & spoke to patient  Date of symptom onset: 7/9/25  Symptom details:   BM/d: 3-5 liquid, (stools liquid since optimizing Imuran to 250mg , 7/5/25)  Blood  1 time yesterday (1/4 to 1/2 cup), 1 time today (less today, maybe a few tablespoon)   3 noc. BM last night   Chills, left work early yesterday (7/9)  Did not take temperature, enc to get thermometer for future next time he feels febrile, v/u  Mild urgency   Took tylenol yesterday and today   Questionably dehydrated, drinking plenty of fluids   Headache yesterday  Denies abdominal pain  Denies sick contact, denies urinary symptoms, denies URI symptoms     Disease phenotype: Crohn's disease (pan-colonic, perianal fistula).   current IBD meds:  infliximab 10mg/kg every 4 weeks (started 5mg/kg on 12/26/24, 2nd dose 1/9/25, optimized to 10mg/kg for 3rd dose on 2/7/24, dose due on 3/21 delayed to 3/28 due to being out of town, optimized to 10mg/kg Q4W on 4/18/25, LD 6/13, ND 7/11) + imuran (100 mg/d  12/26/24)  imuran 250mg started 7/5/25, RX Adherence: Adherent   Additional pertinent information (recent lab, scope, imaging, etc.):   6/26/25 stool ra: 7830  6/26/25 pro-predict metabolites: 109  3/6/25 IFX level: 9.4  Getting labs and IFX infusion tomorrow (7/11)  Next OV: 8/28  Provider: Dr. Bhat updated      "

## 2025-07-11 NOTE — TELEPHONE ENCOUNTER
Plan to add CMV DNA PCR & calprotectin to orders for [facility] & will send portal message  - If negative workup & ongoing fevers then discuss w/ MD to determine if cxr & UA needed  - If negative workup w/o fevers though ongoing GI symptoms then plan to start prednisone 40 mg/ QD  - Timing of IFX infusion TBD

## 2025-07-11 NOTE — TELEPHONE ENCOUNTER
Called & spoke to pt  - Instructed to not proceed w/ infusion as scheduled until infection workup completed & cleared by IBD team to resume  - Denies recent travel, known sick contacts, consumption of contaminated food/beverages, upper respiratory symptoms, rash, dysuria, or recent abx use  - Received hep A & hep B vaccine 6/26/25  - Will d/c Tylenol/ alkaseltzer use to see if masking fever & if recurrence of fever then will head to ED for evaluation given immunosuppression  - Will only use Tylenol if needed & reviewed max dose 4g/d to prevent tylenol toxicity/ liver injury   - Informed of plan to see sooner in clinic; pt declined 7/17/25 appt stating his daughter is flying in on 7/16/25  - Will update Dr. Bhat

## 2025-07-11 NOTE — TELEPHONE ENCOUNTER
Called & spoke to pt  - Reviewed portal message received regarding fever of 101.7F at 6pm  - Pt states he feels fine today   - Took 3 Tylenol regular strength (975 mg) & Alkaseltzer cold/flu (per pt does not contain acetaminophen or aspirin) at 6 pm, 10 pm, & 6:30 am  - Reports temp of 98.8F at 10:30 pm & 96.1 at 6:30 am  - 1 liquid BM last night & 2 liquid BM this AM  - Denies blood or pain since 1pm yesterday  - Will review plan of care w/ Dr. Bhat since pt scheduled for IFX infusion later today  - Pt expressed understanding

## 2025-07-12 LAB
BASOPHILS # BLD AUTO: 0 X10E3/UL (ref 0–0.2)
BASOPHILS NFR BLD AUTO: 0 %
BUN SERPL-MCNC: 9 MG/DL (ref 6–24)
BUN/CREAT SERPL: 8 (ref 9–20)
CALCIUM SERPL-MCNC: 8.7 MG/DL (ref 8.7–10.2)
CHLORIDE SERPL-SCNC: 106 MMOL/L (ref 96–106)
CO2 SERPL-SCNC: 23 MMOL/L (ref 20–29)
CREAT SERPL-MCNC: 1.06 MG/DL (ref 0.76–1.27)
CRP SERPL-MCNC: 45 MG/L (ref 0–10)
EOSINOPHIL # BLD AUTO: 0.2 X10E3/UL (ref 0–0.4)
EOSINOPHIL NFR BLD AUTO: 3 %
ERYTHROCYTE [DISTWIDTH] IN BLOOD BY AUTOMATED COUNT: 16.4 % (ref 11.6–15.4)
GFR SERPLBLD CREATININE-BSD FMLA CKD-EPI: 87 ML/MIN/1.73
GLUCOSE SERPL-MCNC: 113 MG/DL (ref 70–99)
HCT VFR BLD AUTO: 38.5 % (ref 37.5–51)
HGB BLD-MCNC: 11.4 G/DL (ref 13–17.7)
IMM GRANULOCYTES # BLD AUTO: 0 X10E3/UL (ref 0–0.1)
IMM GRANULOCYTES NFR BLD AUTO: 0 %
LYMPHOCYTES # BLD AUTO: 1.2 X10E3/UL (ref 0.7–3.1)
LYMPHOCYTES NFR BLD AUTO: 18 %
MCH RBC QN AUTO: 23.7 PG (ref 26.6–33)
MCHC RBC AUTO-ENTMCNC: 29.6 G/DL (ref 31.5–35.7)
MCV RBC AUTO: 80 FL (ref 79–97)
MONOCYTES # BLD AUTO: 1.4 X10E3/UL (ref 0.1–0.9)
MONOCYTES NFR BLD AUTO: 20 %
NEUTROPHILS # BLD AUTO: 4 X10E3/UL (ref 1.4–7)
NEUTROPHILS NFR BLD AUTO: 59 %
PLATELET # BLD AUTO: 364 X10E3/UL (ref 150–450)
POTASSIUM SERPL-SCNC: 3.9 MMOL/L (ref 3.5–5.2)
RBC # BLD AUTO: 4.82 X10E6/UL (ref 4.14–5.8)
SODIUM SERPL-SCNC: 142 MMOL/L (ref 134–144)
WBC # BLD AUTO: 6.8 X10E3/UL (ref 3.4–10.8)

## 2025-07-16 ENCOUNTER — RESULTS FOLLOW-UP (OUTPATIENT)
Dept: GASTROENTEROLOGY | Facility: CLINIC | Age: 50
End: 2025-07-16
Payer: COMMERCIAL

## 2025-07-17 ENCOUNTER — TELEPHONE (OUTPATIENT)
Dept: GASTROENTEROLOGY | Facility: CLINIC | Age: 50
End: 2025-07-17
Payer: COMMERCIAL

## 2025-07-17 DIAGNOSIS — Z79.60 ENCOUNTER FOR MONITORING IMMUNOSUPPRESSIVE MEDICATION THERAPY CAUSING IMMUNODEFICIENCY: ICD-10-CM

## 2025-07-17 DIAGNOSIS — D84.821 IMMUNODEFICIENCY DUE TO LONG TERM IMMUNOSUPPRESSIVE DRUG THERAPY: ICD-10-CM

## 2025-07-17 DIAGNOSIS — Z79.60 IMMUNODEFICIENCY DUE TO LONG TERM IMMUNOSUPPRESSIVE DRUG THERAPY: ICD-10-CM

## 2025-07-17 DIAGNOSIS — T45.1X5A IMMUNODEFICIENCY DUE TO LONG TERM IMMUNOSUPPRESSIVE DRUG THERAPY: ICD-10-CM

## 2025-07-17 DIAGNOSIS — D84.821 ENCOUNTER FOR MONITORING IMMUNOSUPPRESSIVE MEDICATION THERAPY CAUSING IMMUNODEFICIENCY: ICD-10-CM

## 2025-07-17 DIAGNOSIS — K50.10 CROHN'S DISEASE OF COLON WITHOUT COMPLICATION: Primary | ICD-10-CM

## 2025-07-17 DIAGNOSIS — Z51.81 ENCOUNTER FOR MONITORING IMMUNOSUPPRESSIVE MEDICATION THERAPY CAUSING IMMUNODEFICIENCY: ICD-10-CM

## 2025-07-17 NOTE — TELEPHONE ENCOUNTER
Called and spoke with the patient  - Confirmed currently taking Imuran 250mg daily (started 7/5/25)  - [facility] orders sent in PM   - CBC, CMP (first week of 8/2025)   - CBC, CMP, Thiopurine pro metabolites (6TG/6MMP), CRP, stool calprotectin (first week of 9/2025)   - Patient verbalized understanding

## 2025-07-17 NOTE — TELEPHONE ENCOUNTER
----- Message from Tianna Bhat MD sent at 7/16/2025 11:25 AM CDT -----  Florinda  Please confirm date imuran increased and make sure dates of labs line up for monitoring which should be week 4, week 8. With week 8 labs he will need thiopurine metabolites and also repeat CRP and   stool calprotectin  Infliximab infusion this week and then 8/8 and then continue q 4 weeks  Continue imuran 250 mg daily- confirm pt is taking this    SS   ----- Message -----  From: Shivani Goss  Sent: 7/12/2025   6:09 AM CDT  To: Tianna Bhat MD

## 2025-07-18 NOTE — TELEPHONE ENCOUNTER
Spoke with [facility], no pending IFX on their end. This wasn't drawn on 7/11 as expected. Other labs were completed as scheduled.    Spoke with Dharmesh:  Confirmed his LD IFX was due 7/11 but HELD d/t fevers  Rec'd IFX 7/17, ND: 8/8 if getting back on schedule but he states they scheduled it for 8/14    Dr. Bhat will be updated for recommendation.

## 2025-07-23 ENCOUNTER — TELEPHONE (OUTPATIENT)
Dept: GASTROENTEROLOGY | Facility: CLINIC | Age: 50
End: 2025-07-23
Payer: COMMERCIAL

## 2025-07-23 NOTE — TELEPHONE ENCOUNTER
Tianna Bhat MD  P [facility] Ibd Pharmacist  Cc: Tianna Bhat MD  Caller: Unspecified (Yesterday, 12:06 PM)  This is a patient that Janeen was seeing.  He was supposed to get trough IFX levels but looks like they were not drawn. He had significant symptoms but some of the fever and acute symptoms have subsided. He has appt with us 8/28.    Please get symptom, lab/biomarker,IBD med update and lets discuss what to do next given we don't have IFX levels.    Thanks  SS

## 2025-07-23 NOTE — TELEPHONE ENCOUNTER
"Spoke with pt- reason for call: check in, med update  - Current IBD meds: Infliximab 10mg/kg every 4 weeks (started 5mg/kg on 12/26/24, 2nd dose 1/9/25, optimized to 10mg/kg for 3rd dose on 2/7/24, dose due on 3/21 delayed to 3/28 due to being out of town, optimized to 10mg/kg Q4W on 4/18/25, 7/11 infusion delayed to 7/17 due to infection, ND on track with original schedule would be 8/8 but scheduled 8/14) + imuran 250 mg/d (started 12/26/24 100 mg/day, increased to 250 mg/day 7/5/25)- taking 2 tabs in the AM and 3 tabs in the PM      Symptoms:  - 6-7 BM/day-  soft to formed in small pieces, also has liquid stool in the morning.  Occasional nocturnal BM in the last week.   - Gassy/bloated similar to last OV- worse later in the day.  - Blood- occasional- 1 BM/day has small amount of blood-  mixed in the stool  - no urgency, abd pain, fever, chills, headaches  - Overall- feeling much better than 7/10 symptom update, similar compared to 6/26 OV    Pertinent info:  - Colonoscopy 5/2025- moderate inflammation with ulceration, though improved compared to previous scope in 12/2024 (had only been on max dose of IFX for 1 month prior to scope)  - 6/26 calprotectin 7830   - 6/26 6 , 6 MMP < 307-  since pt continues to have some ongoing inflammation and IFX dose is maximized, AZA dose optimized (increased from 100 mg/day to 250 mg/day 7/5).    - 7/10- patient reported "flare up" since 7/9- chills, headache, fecal urgency, bloody liquid stool 3-5/day; noted to have 101.7 fever on 7/10  - 7/11- patient reported feeling fine, no blood or pain since 7/10.  Labs ordered given recent fevers- 7/11 CRP: 45, CMV DNA, Quant, PCR: Neg, blood culture, stool culture neg, C diff negative, blood cultures neg, calprotectin 6140.  IFX level not drawn as ordered  - 7/14 reported feeling back to normal- 5-6 formed BM/day, cleared to resume IFX- received 7/17    Current lab plans-   CBC CMP first week of 8/2025  (4 weeks after higher dose " of Imuran started)  CBC CMP 6-TG CRP stool calprotectin first week of 9/2025  (8 weeks after higher dose of Imuran started)    - Will message [facility] to request next IFX infusion be moved up to 8/8 (on track with original schedule) and update Dr Bhat.    Next OV Dr Bhat 8/28/25

## 2025-07-24 NOTE — TELEPHONE ENCOUNTER
- HepB #1 completed 6/26/25  - HepB #2 RN visit scheduled today 7/24/25  - Dr Bhat would like to do follow up visit earlier than August if possible- please see if we can move up next follow up visit and do same day HepB vaccine.  Looks like there is an open clinic f/u slot on 7/30/25 at 8 AM

## 2025-07-24 NOTE — TELEPHONE ENCOUNTER
Called and spoke with the patient  - Discussed canceling HepB #2 nurse visit today and rescheduling with is OV with Dr. Bhat on 7/30 at 8am.  - Patient is agreeable to this  - OV and HepB #2 rescheduled to 7/30

## 2025-07-25 RX ORDER — CETIRIZINE HYDROCHLORIDE 10 MG/1
10 TABLET ORAL
OUTPATIENT
Start: 2025-07-25

## 2025-07-25 RX ORDER — HEPARIN 100 UNIT/ML
500 SYRINGE INTRAVENOUS
OUTPATIENT
Start: 2025-07-25

## 2025-07-25 RX ORDER — ACETAMINOPHEN 325 MG/1
650 TABLET ORAL
OUTPATIENT
Start: 2025-07-25

## 2025-07-25 RX ORDER — IPRATROPIUM BROMIDE AND ALBUTEROL SULFATE 2.5; .5 MG/3ML; MG/3ML
3 SOLUTION RESPIRATORY (INHALATION)
OUTPATIENT
Start: 2025-07-25

## 2025-07-25 RX ORDER — DIPHENHYDRAMINE HYDROCHLORIDE 50 MG/ML
50 INJECTION, SOLUTION INTRAMUSCULAR; INTRAVENOUS ONCE AS NEEDED
OUTPATIENT
Start: 2025-07-25

## 2025-07-25 RX ORDER — EPINEPHRINE 0.3 MG/.3ML
0.3 INJECTION SUBCUTANEOUS ONCE AS NEEDED
OUTPATIENT
Start: 2025-07-25

## 2025-07-25 RX ORDER — SODIUM CHLORIDE 0.9 % (FLUSH) 0.9 %
10 SYRINGE (ML) INJECTION
OUTPATIENT
Start: 2025-07-25

## 2025-07-25 NOTE — TELEPHONE ENCOUNTER
"- Per [facility], in order to keep Inflectra next dose on track with original schedule, patient insurance requires a new order for "frequency change" and new auth to facilitate early dispense.   - Inflectra last dose due 7/11 delayed to 7/17 d/t infection, Aiming for ND 8/8 (3 weeks after LD administered on 7/17) to keep pt on original dosing schedule- Inflectra 10 mg/kg Q3 week x 1 dose therapy plan entered for [facility]     "

## 2025-07-29 NOTE — PROGRESS NOTES
"       [facility]             Inflammatory Bowel Disease   Follow-up Note              TODAY'S VISIT DATE:  7/30/2025    Chief Complaint:   Chief Complaint   Patient presents with    Follow-up    Crohn's Disease     PCP: Unable, To Obtain    Previous History:  Dharmesh Lozoya is a 50 y.o. male with Crohn's disease (pancolonic, perianal fistula). Patient was doing well until 2015 and developed blood in his stools and saw family medicine doctor in [place] and had a flex sig and diagnosed "colitis." He then had a colonoscopy by a GI doctor in [place] and recalls being told distal colitis and started on medication that was too expensive and took for 30 days (sounds like apriso) but too expensive and not effective so discontinued. At that time he did not have insurance so he proceeded with dietary changes (more fish, turmeric). Within a few weeks symptoms resolved and back to baseline normal BMs 4-5 variable consistency but no blood and able to go back to normal lifestyle. In 2017 he moved from [place] to [place] and in 12/2022 began with worsening bloody diarrhea, cramping and weight loss. He was diagnosed with C diff and vancomycin started which helped significant with his symptoms. His symptoms went from 10-15 BMs/d to 8-12 BMs/d after oral vancomycin. 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 He was then placed on lialda 3.6 g/d with entocort 9 mg/d (decreased to 6 mg/d 3/2023) which he took for 1 month with some response and then discontinued due to ineffectiveness. He was hospitalized 3/2023 at [facility] for 1 days due to significant bloody diarrhea with weakness and dehydration and he had ran out of entocort a week before " and was off of lialda by that time. He was discharged home on prednisone 40 mg daily for 2 weeks and advised to f/u with his GI doctor. In 3/2023 he noticed abscess which spontaneously drained and also at that time had issues with hemorrhoids He then started stelara 4/12/23. In 5/2023 pt had good response in regards to bleeding and stool frequency and at that time C diff recurrent and treated with vancomycin x 10 days. Approximately about 6 weeks after starting IV stelara noticed initially small pruritic blisters between toes and then progressed to head, arms, legs, feet, toes. These lesions were treated symptomatically with hydroxyzine and he took athlete's foot powder and then eventually got topical cream for horses that helped (antifungal/antibacterial). Diet he found if dairy, spicy foods, red sauce exacerbated symptoms. Colonoscopy 12/29/23 significant for inflammation with congestion, erosions, friability and mucus in a contiuous and circumferential pattern from anus to sigmoid c/w moderate inflammation (hernandez score 3)-bx c/w moderate active inflammation, CMV neg. Perianal exam at time of scope c/w palpable lump c/w fistula with drainage. He discontinued stelara 12/6/23 due to rash which resolved after discontinutaiton. In 12/2023 pt started on oral prednisone 40 mg daily for 3 weeks and then decreased to 30 mg/d around 1/20/24 and continued on this when he was initially seen in IBD clinic 2/6/24 at which time he was having 4-5 soft to formed BMs/d. Since first week of Jan 2024 his symptoms started improving. Patient was started on humira 40 mg every 2 weeks on 3/20/2024 and he continued prednisone taper. MRI pelvis on 3/11/24 was significant for left intersphincteric perianal fistula and rectal inflammation. In late 3/2024 he was started on canasa suppositories and we continued the prednisone taper. In 6/14/24, elected to start Rinvoq. Pt induced with oral prednisone 6/14-8/6/24, 8/29/24-11/12/24 and had  extended course of high dose rinvoq 45 mg daily for 6 mos (6/14/24-12/17/24) but remained steroids dependent though steroids effective. In end 8/2024 due to some purulent perianal drainage from fistula we started 2 week course of cipro/flagyl which was effective. Stool calprotectin 6/7/24 1400, 8/16/24 1370 and 10/18/24 176. When diarrhea returned we proceeded with repeat stool studies to rule out infection and colonoscopy significant for fair bowel prep but moderate to severe colitis in the left colon with 2 small localized areas of erythema in ascending colon/appendiceal orifice (bx neg CMV). We restarted induction prednisone 12/18/24 and proceeded with starting inflectra and imuran on 12/26/2024. IFX trough level at week 2 was undetectable without Ab therefore we optimized his dose. He received 10 mg/kg dose for 3rd induction dose on 2/7/25 and was was able to completely taper off prednisone by 2/11/2025. We attempted to get infliximab trough levels at week 6, but they were mishandled at the lab. Infliximab levels 4 weeks after completing all three induction doses (week 10) were 9.4 without Ab, therefore, we proceeded to further optimize his maintenance dose to 10 mg/kg q 4 weeks which he started on 4/18/2025. A colonoscopy from 5/14/2025 showed moderate inflammation with ulceration, though improved compared to previous scope in 12/2024. Biopsies confirmed scope findings and polyp removed was inflammatory. Since pt had only been on max dose of IFX for 1 month prior to scope and was clinically stable we decided to continue infliximab 10mg/kg Q4W and azathioprine 100mg/d with plans to reassess thiopurine levels and stool calprotectin. Patient had worsening of the symptoms post 5/14/25 scope possibly related to prep and colonoscopy as well as reintroducing sugars into his diet.  By 6/2025 visit his symptoms were slowly improving. IFX level not drawn by [facility] as ordered in 6/2025 so we proceeded with thiopurine  level to assess if we can further optimize azathioprine dose. Also planned on repeat a stool calprotectin to assess disease activity and repeat an infliximab trough before 7/11/25 dose which was also not drawn by [facility].     Interval History:  - current IBD meds:  Infliximab 10mg/kg every 4 weeks (started 5mg/kg on 12/26/24, 2nd dose 1/9/25, optimized to 10mg/kg for 3rd dose on 2/7/24, dose due on 3/21 delayed to 3/28 due to being out of town, optimized to 10mg/kg Q4W on 4/18/25, 7/11 infusion delayed to 7/17 due to concern for infection, ND on track with original schedule would be 8/8 insurance denied so scheduled 8/14) + imuran 250 mg/d (started 12/26/24 100 mg/day, increased to 250 mg/day 7/5/25)  - diet: meats, vegetables, and bread; has not cut back on sugars since last visit (drinks 1 20 oz coke per day)   - stool calprotectin: 2/9/24 104, 6/7/24 1400,  8/16/24 1370, 10/18/24 176, 12/17/24 >8,000, 6/26/25 7830, 7/11/25 6140  - 6-7 liquid to formed BM/day.  1st BM of the day is liquid then soft to formed for the rest of the day.  Had looser stool when taking azathioprine in the AM so changed dosing to PM 4 days ago and noticed improvement in consistency and no more nocturnal BM.    No blood. No abd pain.  Rare to urgency (Typically has good control),  bloated (left side)- ongoing. Overall: stable symptoms compared to last visit  - no recurrent skin issues, sees local derm   - former smoker- stopped 2009 and was using chewing tobacco - discontinued 8/2024, on nicotine pouches; restarted chewing tobacco (3x/day) early June 2025  - 6/26 calprotectin 7830   - 6/26 6 , 6 MMP < 307- since pt continues to have some ongoing inflammation and IFX dose maximized, AZA dose optimized (increased from 100 mg/day to 250 mg/day starting 7/5).   - 7/10- patient reported flare up since 7/9- chills, headache, fecal urgency, liquid stool 3-5/day since increase AZA to 250 mg/day, bloody stool 1x 7/9 and 7/10,  101.7 fever on  7/10. Labs ordered and IFX infusion delayed  - 7/11- patient reported feeling fine, no blood or pain since 7/10, still having liquid BM. 7/11 labs CRP 45, CMV DNA, Quant, PCR Neg, blood culture neg , stool culture neg, C diff negative, blood cultures neg, calprotectin 6140. IFX level not drawn as ordered  - 7/14 reported feeling back to normal- 5-6 formed BM/day, cleared to resume IFX- received infusion 7/17    Past Medical History[1]    Prior Pertinent Surgeries:   None     Last pertinent Endoscopy/Imaging:  3/11/24 MRI pelvis: Left intersphincteric perianal fistula at the left lateral approximate 4 o'clock position, measuring approximately 3.8 cm in length.  One suspected point of exit about the left gluteal fold.  Additional suspected blind-ending component about the left gluteal soft tissues.  Suspected granulation tissue about the visualized perianal fistula.  Diffuse rectal mucosal hyperenhancement and wall thickening, as well as inflammatory change about the perirectal fat and multiple prominent perirectal lymph nodes.Prostatomegaly.  Possible right-sided varicocele.    5/13/25 Colonoscopy: From anal verge to mid transverse colon there is moderate inflammation characterized by erythema with congestion and linear ulcerations and multiple inflammatory polyps though all not sampled. Cluster of polyps in the descending colon in setting of inflammation. Scope aborted in transverse colon due to looping and risk of perforation. Biopsies confirmed inflammation seen on scope and polyp was inflammatory. No dysplasia.     Therapeutic Drug Monitoring Labs:  5/29/24 trough ADA<0.6 with Ab 432 (on humira 40mg Q2W)  1/9/25 trough IFX <1.0/Abs neg (2 weeks after 1st dose of IFX 5 mg/kg)  3/7/25 trough IFX level 9.4/ neg Ab (week 10) (4 weeks after completing induction doses with 5mg/kg on week 0, 2 and 10mg/kg on week 6)     Prior IBD Therapies:  Oral mesalamine (unclear which one but seems like apriso)- ineffective but only  took for 4 weeks  lialda 3.6 g/d   Entocort 9 mg/d- ineffective  Prednisone - effective (losses response in doses 15 mg or less)  Methotrexate - d/c due to elevated ALT  Stelara 90mg SC every 8 weeks (4/12/23 - 12-6/23) - d/c due to rash which resolved after stopping the medication  Humira (3/20/24 - 5/29/24) - d/c due to undetectable levels and high antibodies   Rinvoq 45 mg po daily (6/14/24-12/17/24)- ineffective    Vaccinations:  Lab Results   Component Value Date    HEPBSURFABQU Negative 02/06/2024    HEPBSURFABQU <3 02/06/2024     Lab Results   Component Value Date    HEPAIGG Non-reactive 02/06/2024     Lab Results   Component Value Date    VARICELLAZOS 1030.00 02/06/2024    VARICELLAINT Positive 02/06/2024     Lab Results   Component Value Date    MUMPSIGGSCRE <5.00 02/06/2024    MUMPSIGGINTE Negative 02/06/2024      Lab Results   Component Value Date    RUBEOLAIGGAN 64.20 02/06/2024    RUBEOLAINTER Positive 02/06/2024     Immunization History   Administered Date(s) Administered    COVID-19, MRNA, LN-S, PF (Pfizer) (Purple Cap) 01/12/2022    Hepatitis A, Adult 06/26/2025    Hepatitis B (recombinant) Adjuvanted, 2 dose 06/26/2025, 07/30/2025    Pneumococcal Conjugate - 20 Valent 06/14/2024    Zoster Recombinant 06/14/2024, 12/03/2024   Flu shot: recommended yearly-  offered high dose flu shot same day as scope 11/2025 but patient declined  COVID vaccine/booster:  per CDC recommendations  RSV: after age 61 yo if high risk  Tetanus (Tdap):  last tetanus was over 10 years ago. Discussed and recommended to get at his local pharmacy   HPV: NA     Meningococcal: NA  Hepatitis B: Dose #2 today, check immunity with future labs   Hepatitis A: Dose #2 between 12/26/25-6/26/26  MMR (live vaccine): not immune to mumps, immune to rubella      Review of Systems: see pertinent review of systems above    Medications Ordered Prior to Encounter[2]    Physical Examination  /79 (BP Location: Right arm, Patient Position:  "Sitting)   Pulse 70   Temp 98.4 °F (36.9 °C)   Ht 6' 2" (1.88 m)   Wt 105.2 kg (231 lb 14.8 oz)   SpO2 98%   BMI 29.78 kg/m²    Constitutional: well developed, no cough, no dyspnea, alert, and no acute distress    Head: Normocephalic, no lesions, without obvious abnormality  Eye: Normal external eye, conjunctiva, and lids  Cardiovascular: regular rate and regular rhythm  Respiratory: normal air entry, CTA B  Gastrointestinal: soft, non-tender, non-distended, normal BS  Psychiatric: appropriate, normal mood    Labs:   Lab Results   Component Value Date    CRP 45 (H) 07/11/2025    CALPROTECTIN 7,830 (H) 06/26/2025     Lab Results   Component Value Date    HEPBSAG Non-reactive 12/17/2024    HEPBCAB Non-reactive 12/17/2024   ,   Lab Results   Component Value Date    TBGOLDPLUS Negative 12/17/2024     Lab Results   Component Value Date    CANUVFTB92EA 26.9 (L) 06/06/2025    DJQDNFTG02 348 02/06/2024     Lab Results   Component Value Date    WBC 6.8 07/11/2025    HGB 11.4 (L) 07/11/2025    HCT 38.5 07/11/2025    MCV 80 07/11/2025     07/11/2025     Lab Results   Component Value Date    CREATININE 1.06 07/11/2025    ALBUMIN 3.9 (L) 06/06/2025    BILITOT 0.2 06/06/2025    ALKPHOS 63 02/06/2025    AST 24 06/06/2025    ALT 18 06/06/2025     Assessment/Plan:  Dharmesh Lozoya is a 50 y.o. male with with Crohn's disease (pan-colonic, perianal fistula).    On 6/26/25 stool calprotectin resulted elevated 7830 and thiopurine metabolites 6 , 6 MMP < 307.  Given ongoing inflammation and IFX dose already maximized, azathioprine dose increased from 100 mg/day to 250 mg/day starting 7/5/25.  On 7/9/25 patient developed acute change in symptoms including  chills, headache, pure liquid BM with blood but these acute symptoms only lasted about 48 hours so possibly viral.  On 7/11/25 labs with CRP elevated (45), CMV DNA negative, stool studies negative for infection, and calprotectin remained elevated at 6140.  7/11/25 " Infliximab level again missed by [facility]. Infliximab infusion due 7/11/25 delayed to 7/17/25 due to infection work up.  Insurance denied request for next infliximab dose to remain on track with original schedule so patient will receive next infusion 8/14/25.  Plan to repeat infliximab level and thiopurine metabolites with next infusion.  Infliximab level will not be true trough but will be helpful to get a general idea of drug level.  Patient agreed to get these labs done at [facility] given ongoing problems with [facility] not drawing drug levels which are essential to guide therapy decision.  If 8/13/25 calprotectin remains elevated will consider adding Evinature to infliximab + azathioprine. Encouraged patient to research Evinature in the meantime.  Plan on next colonoscopy 11/2025 to assess endoscopic response after azathioprine dose increase +/- Evinature addition pending clinical course.  Aiming to have therapeutic infliximab + thiopurine metabolites levels by time of next colonoscopy.     # Crohn's disease (pan-colonic, perianal fistula):    - continue infliximab 10mg/kg every 4 weeks   - continue imuran 250 mg/d   - pending clinical course, may consider addition of Evinature  - diet: avoid sugars including artificial sweeteners   - stool calprotectin 8/13/25 - same day as bloodwork  - colonoscopy 11/2025, clinic visit 2 weeks later-  Miralax with Gatoraid  - basic labs: CRP 8/13/25  - drug monitoring labs: [facility]), TPMT (normal 2/2024), TB quantiferon (12/2025), Hep B testing (12/2025)  - TDM: thiopurine metabolites and IFX trough level 8/13/25 before next infusion      # Prostate- MRI pelvis showed prostatomegaly and possible right sided varicocele  - will consider urology referral at later date      # Immunodeficiency due to long term immunosuppressive drug therapy (infliximab and azathioprine) and IBD specific health maintenance:  CRC risk- RFs-sx 2015,  pancolonic, surveillance colonoscopy once in remission  Skin exam yearly - saw local dermatologist 2/2025, normal exam  Risk for osteopenia/osteoporosis-long term use of prednisone, no longer taking calcium/vit D  Vitamin D- last Vit D low after non adherence to supplementation. Continue vitamin D 2000 IU daily   Lab Results   Component Value Date    MQXDFEET78VM 26.9 (L) 06/06/2025   Vaccines- no live vaccines, HepB dose #2 today, Tdap recommended,  HepA dose #2 between 12/26/25-6/26/26, offered RN appt for high dose flu shot in the fall but pt declined    I personally examined the patient and discussed above plan in collaboration with [place] PharmD.      Visit today is associated with current or anticipated ongoing medical care related to this patient's single serious condition/complex condition - Crohn's disease (pan-colonic, perianal fistula).     Follow up for 2 weeks after colonoscopy.    Tianna Bhat MD, FACG, AGAF  Section Head, Inflammatory Bowel Disease  Department of Gastroenterology           [1]   Past Medical History:  Diagnosis Date    Crohn's disease     Hx of pancreatitis     Recurrent Clostridioides difficile infection    [2]   Current Outpatient Medications on File Prior to Visit   Medication Sig Dispense Refill    azaTHIOprine (IMURAN) 50 mg Tab Take 5 tablets (250 mg total) by mouth once daily. 150 tablet 0    cholecalciferol, vitamin D3, (VITAMIN D3) 50 mcg (2,000 unit) Cap capsule Take 2,000 Units by mouth once a week.      infliximab-dyyb (INFLECTRA IV) Inject 10 mg/kg into the vein every 28 days.      [DISCONTINUED] CALCIUM-MAGNESIUM-ZINC ORAL Take by mouth. Dosing unknown       Current Facility-Administered Medications on File Prior to Visit   Medication Dose Route Frequency Provider Last Rate Last Admin    hepatitis B vacc-CpG 1018 (PF) 20 mcg/0.5 mL Syrg 20 mcg  0.5 mL Intramuscular 1 time in Clinic/HOD iTanna Bhat MD

## 2025-07-29 NOTE — PATIENT INSTRUCTIONS
Continue Infliximab and azathioprine  Research Evinature. Evinature may be added to your medication regimen if your stool calprotectin 8/13/25 is still elevated.    Labs-    8/13/25-[facility]- CBC, CMP, IFX level, thiopurine metabolites, CRP, stool calprotectin  2nd week of 9/2025- CBC, CMP- [facility]  Colonoscopy 11/2025, clinic visit 2 weeks later-  Miralax with Gatoraid    IBD specific health maintenance recommendations:  Vaccines- HepB #2 today, Tdap at local pharmacy, high dose flu shot same day as colonoscopy  Skin exam- yearly    Contact us by sending a [employer] message or by calling [phone] to report the following:  - Changes in bowel symptoms  - Symptoms of infection including fever of 100.0 F or higher  - Antibiotics prescribed  - New medical diagnosis or new medication  - If you are planning on having surgery since we may need to tailor your meds based on surgery timing    Evinature information:  CurQD is a nutraceutical used to support the treatment of IBD. It is intended to be taken alongside your current medical treatment. To begin, please complete the online assessment at THIS LINK. Based on your responses, you will receive a personalized 6-week protocol.     Please note:  Do not make any changes to your prescribed medications without consulting your treating physician.  Full dosing instructions and safety information are provided in the leaflet included in your protocol box.

## 2025-07-30 ENCOUNTER — TELEPHONE (OUTPATIENT)
Dept: GASTROENTEROLOGY | Facility: CLINIC | Age: 50
End: 2025-07-30
Payer: COMMERCIAL

## 2025-07-30 ENCOUNTER — OFFICE VISIT (OUTPATIENT)
Dept: GASTROENTEROLOGY | Facility: CLINIC | Age: 50
End: 2025-07-30
Payer: COMMERCIAL

## 2025-07-30 VITALS
TEMPERATURE: 98 F | HEIGHT: 74 IN | BODY MASS INDEX: 29.77 KG/M2 | DIASTOLIC BLOOD PRESSURE: 79 MMHG | OXYGEN SATURATION: 98 % | WEIGHT: 231.94 LBS | SYSTOLIC BLOOD PRESSURE: 124 MMHG | HEART RATE: 70 BPM

## 2025-07-30 DIAGNOSIS — Z51.81 THERAPEUTIC DRUG MONITORING: ICD-10-CM

## 2025-07-30 DIAGNOSIS — D84.821 IMMUNODEFICIENCY DUE TO LONG TERM IMMUNOSUPPRESSIVE DRUG THERAPY: Primary | ICD-10-CM

## 2025-07-30 DIAGNOSIS — K50.10 CROHN'S DISEASE OF COLON WITHOUT COMPLICATION: ICD-10-CM

## 2025-07-30 DIAGNOSIS — Z79.60 IMMUNODEFICIENCY DUE TO LONG TERM IMMUNOSUPPRESSIVE DRUG THERAPY: Primary | ICD-10-CM

## 2025-07-30 DIAGNOSIS — T45.1X5A IMMUNODEFICIENCY DUE TO LONG TERM IMMUNOSUPPRESSIVE DRUG THERAPY: Primary | ICD-10-CM

## 2025-07-30 PROCEDURE — 99214 OFFICE O/P EST MOD 30 MIN: CPT | Mod: 25,S$GLB,, | Performed by: INTERNAL MEDICINE

## 2025-07-30 PROCEDURE — 90471 IMMUNIZATION ADMIN: CPT | Mod: S$GLB,,, | Performed by: INTERNAL MEDICINE

## 2025-07-30 PROCEDURE — 90739 HEPB VACC 2/4 DOSE ADULT IM: CPT | Mod: S$GLB,,, | Performed by: INTERNAL MEDICINE

## 2025-07-30 PROCEDURE — 99999 PR PBB SHADOW E&M-EST. PATIENT-LVL IV: CPT | Mod: PBBFAC,,, | Performed by: INTERNAL MEDICINE

## 2025-07-30 PROCEDURE — G2211 COMPLEX E/M VISIT ADD ON: HCPCS | Mod: S$GLB,,, | Performed by: INTERNAL MEDICINE

## 2025-07-30 NOTE — TELEPHONE ENCOUNTER
Patient is scheduled for a Colonoscopy on 11/3/25 with Dr. MINH Bhat  Referral for procedure from Clinic visit

## 2025-08-05 ENCOUNTER — TELEPHONE (OUTPATIENT)
Dept: GASTROENTEROLOGY | Facility: CLINIC | Age: 50
End: 2025-08-05
Payer: COMMERCIAL

## 2025-08-05 NOTE — TELEPHONE ENCOUNTER
Spoke with Dharmesh:  Current IBD meds:  IFX 10mg/kg q4w, LD: 7/11 postponed to 7/17 d/t fevers, ND: rescheduled from 8/14 to 8/8 to get back on schedule  AZA 250mg/d, started 7/5/25, labs due q4w x 2, (CBC/CMP, added IFX, thiopurines metabolites, CRP - 8/13 prior to inf 8/14 but this has been changed back to previous schedule 8/8)  Labs and due dates:  CBC/CMP, added IFX, thiopurines metabolites, CRP - 8/13 prior to inf 8/14 but this has been changed back to previous schedule 8/8  CBC, CMP, thiopurine metabolites 1st week of Sept  Will discuss labs and schedules for labs with Dr. Bhat

## 2025-08-05 NOTE — TELEPHONE ENCOUNTER
Copied from previous PM:    Dharmesh Lozoya to [facility]FF[facility] Staff (supporting Florinda Maldonado, RN)  TB      8/5/25 11:55 AM  Florinda Eli just messaged me about my next infusion and stated its August 8th which is this Friday. Please advise on going forward and blood work.

## 2025-08-05 NOTE — TELEPHONE ENCOUNTER
High priority staff message sent to [facility] to clarify IFX next infusion date.  IBD pharmacy team was advised that auth to request next infusion 8/8 (on track with original schedule) was denied and only option for next infusion date 8/14.  Reminder set to f/u on response tomorrow.

## 2025-08-06 ENCOUNTER — PATIENT MESSAGE (OUTPATIENT)
Dept: GASTROENTEROLOGY | Facility: CLINIC | Age: 50
End: 2025-08-06
Payer: COMMERCIAL

## 2025-08-06 NOTE — TELEPHONE ENCOUNTER
Spoke with pt  - let him know I reached out to [facility] to clarify next infusion date, pending response  - will closely follow and get back with pt tomorrow

## 2025-08-06 NOTE — TELEPHONE ENCOUNTER
[facility] response pending.  2nd high priority message sent today. Reminder set to follow up again tomorrow.

## 2025-08-07 NOTE — TELEPHONE ENCOUNTER
Called pt to confirm- next Infliximab infusion 8/14 at [facility] (not 8/8)    - current IBD meds:  Infliximab 10mg/kg every 4 weeks (started 5mg/kg on 12/26/24, 2nd dose 1/9/25, optimized to 10mg/kg for 3rd dose on 2/7/24, dose due on 3/21 delayed to 3/28 due to being out of town, optimized to 10mg/kg Q4W on 4/18/25, 7/11 infusion delayed to 7/17 due to concern for infection, ND on track with original schedule would be 8/8 insurance denied so scheduled 8/14) + imuran 250 mg/d (started 12/26/24 100 mg/day, increased to 250 mg/day 7/5/25)     - lab appt scheduled 8/13 (CBC, CMP, IFX, prometabolites) - no adjustments needed.  Pt should go to this lab appt as scheduled and submit stool sample for calpro at that time  - next labs 2nd week of 9/2025- CBC CMP (ok to do at [facility])

## 2025-08-13 ENCOUNTER — LAB VISIT (OUTPATIENT)
Dept: LAB | Facility: HOSPITAL | Age: 50
End: 2025-08-13
Attending: INTERNAL MEDICINE
Payer: COMMERCIAL

## 2025-08-13 DIAGNOSIS — K50.10 CROHN'S DISEASE OF COLON WITHOUT COMPLICATION: ICD-10-CM

## 2025-08-13 LAB
ABSOLUTE EOSINOPHIL (OHS): 0.12 K/UL
ABSOLUTE MONOCYTE (OHS): 1.31 K/UL (ref 0.3–1)
ABSOLUTE NEUTROPHIL COUNT (OHS): 4.76 K/UL (ref 1.8–7.7)
ALBUMIN SERPL BCP-MCNC: 3.5 G/DL (ref 3.5–5.2)
ALP SERPL-CCNC: 69 UNIT/L (ref 40–150)
ALT SERPL W/O P-5'-P-CCNC: 22 UNIT/L (ref 0–55)
ANION GAP (OHS): 9 MMOL/L (ref 8–16)
AST SERPL-CCNC: 27 UNIT/L (ref 0–50)
BASOPHILS # BLD AUTO: 0.04 K/UL
BASOPHILS NFR BLD AUTO: 0.5 %
BILIRUB SERPL-MCNC: 0.2 MG/DL (ref 0.1–1)
BUN SERPL-MCNC: 13 MG/DL (ref 6–20)
CALCIUM SERPL-MCNC: 8.3 MG/DL (ref 8.7–10.5)
CHLORIDE SERPL-SCNC: 107 MMOL/L (ref 95–110)
CO2 SERPL-SCNC: 25 MMOL/L (ref 23–29)
CREAT SERPL-MCNC: 1.1 MG/DL (ref 0.5–1.4)
CRP SERPL-MCNC: 7.1 MG/L
ERYTHROCYTE [DISTWIDTH] IN BLOOD BY AUTOMATED COUNT: 17.5 % (ref 11.5–14.5)
GFR SERPLBLD CREATININE-BSD FMLA CKD-EPI: >60 ML/MIN/1.73/M2
GLUCOSE SERPL-MCNC: 113 MG/DL (ref 70–110)
HCT VFR BLD AUTO: 37.6 % (ref 40–54)
HGB BLD-MCNC: 11.2 GM/DL (ref 14–18)
IMM GRANULOCYTES # BLD AUTO: 0.02 K/UL (ref 0–0.04)
IMM GRANULOCYTES NFR BLD AUTO: 0.3 % (ref 0–0.5)
LYMPHOCYTES # BLD AUTO: 1.07 K/UL (ref 1–4.8)
MCH RBC QN AUTO: 23.7 PG (ref 27–31)
MCHC RBC AUTO-ENTMCNC: 29.8 G/DL (ref 32–36)
MCV RBC AUTO: 80 FL (ref 82–98)
NUCLEATED RBC (/100WBC) (OHS): 0 /100 WBC
PLATELET # BLD AUTO: 382 K/UL (ref 150–450)
PMV BLD AUTO: 12.1 FL (ref 9.2–12.9)
POTASSIUM SERPL-SCNC: 3.6 MMOL/L (ref 3.5–5.1)
PROT SERPL-MCNC: 7.3 GM/DL (ref 6–8.4)
RBC # BLD AUTO: 4.73 M/UL (ref 4.6–6.2)
RELATIVE EOSINOPHIL (OHS): 1.6 %
RELATIVE LYMPHOCYTE (OHS): 14.6 % (ref 18–48)
RELATIVE MONOCYTE (OHS): 17.9 % (ref 4–15)
RELATIVE NEUTROPHIL (OHS): 65.1 % (ref 38–73)
SODIUM SERPL-SCNC: 141 MMOL/L (ref 136–145)
WBC # BLD AUTO: 7.32 K/UL (ref 3.9–12.7)

## 2025-08-13 PROCEDURE — 36415 COLL VENOUS BLD VENIPUNCTURE: CPT | Mod: PO

## 2025-08-13 PROCEDURE — 80053 COMPREHEN METABOLIC PANEL: CPT

## 2025-08-13 PROCEDURE — 80230 DRUG ASSAY INFLIXIMAB: CPT

## 2025-08-13 PROCEDURE — 80299 QUANTITATIVE ASSAY DRUG: CPT

## 2025-08-13 PROCEDURE — 85025 COMPLETE CBC W/AUTO DIFF WBC: CPT

## 2025-08-13 PROCEDURE — 86140 C-REACTIVE PROTEIN: CPT

## 2025-08-16 LAB
6-TGN ENTSUB RBC: 126 PMOL/8X10(8)RBC (ref 235–450)
6MMP ENTSUB RBC: 315 PMOL/8X10(8)RBC

## 2025-08-18 ENCOUNTER — TELEPHONE (OUTPATIENT)
Dept: GASTROENTEROLOGY | Facility: CLINIC | Age: 50
End: 2025-08-18
Payer: COMMERCIAL

## 2025-08-18 LAB
CLINICAL BIOCHEMIST REVIEW: NORMAL
INFLIXIMAB SERPL-MCNC: 16 MCG/ML

## 2025-08-25 ENCOUNTER — TELEPHONE (OUTPATIENT)
Dept: GASTROENTEROLOGY | Facility: CLINIC | Age: 50
End: 2025-08-25
Payer: COMMERCIAL

## 2025-08-29 ENCOUNTER — TELEPHONE (OUTPATIENT)
Dept: GASTROENTEROLOGY | Facility: CLINIC | Age: 50
End: 2025-08-29
Payer: COMMERCIAL